# Patient Record
Sex: FEMALE | Race: WHITE | NOT HISPANIC OR LATINO | Employment: UNEMPLOYED | URBAN - METROPOLITAN AREA
[De-identification: names, ages, dates, MRNs, and addresses within clinical notes are randomized per-mention and may not be internally consistent; named-entity substitution may affect disease eponyms.]

---

## 2018-01-12 ENCOUNTER — OFFICE VISIT CONVERTED (OUTPATIENT)
Dept: INTERNAL MEDICINE | Facility: CLINIC | Age: 38
End: 2018-01-12
Attending: INTERNAL MEDICINE

## 2018-01-26 ENCOUNTER — OFFICE VISIT CONVERTED (OUTPATIENT)
Dept: INTERNAL MEDICINE | Facility: CLINIC | Age: 38
End: 2018-01-26
Attending: INTERNAL MEDICINE

## 2018-02-09 ENCOUNTER — CONVERSION ENCOUNTER (OUTPATIENT)
Dept: INTERNAL MEDICINE | Facility: CLINIC | Age: 38
End: 2018-02-09

## 2018-02-09 ENCOUNTER — OFFICE VISIT CONVERTED (OUTPATIENT)
Dept: INTERNAL MEDICINE | Facility: CLINIC | Age: 38
End: 2018-02-09
Attending: INTERNAL MEDICINE

## 2018-02-13 ENCOUNTER — OFFICE VISIT CONVERTED (OUTPATIENT)
Dept: FAMILY MEDICINE CLINIC | Facility: CLINIC | Age: 38
End: 2018-02-13
Attending: PHYSICIAN ASSISTANT

## 2018-03-06 ENCOUNTER — OFFICE VISIT CONVERTED (OUTPATIENT)
Dept: FAMILY MEDICINE CLINIC | Facility: CLINIC | Age: 38
End: 2018-03-06
Attending: PHYSICIAN ASSISTANT

## 2018-03-06 ENCOUNTER — CONVERSION ENCOUNTER (OUTPATIENT)
Dept: FAMILY MEDICINE CLINIC | Facility: CLINIC | Age: 38
End: 2018-03-06

## 2018-03-12 ENCOUNTER — OFFICE VISIT CONVERTED (OUTPATIENT)
Dept: SURGERY | Facility: CLINIC | Age: 38
End: 2018-03-12
Attending: UROLOGY

## 2018-03-15 ENCOUNTER — OFFICE VISIT CONVERTED (OUTPATIENT)
Dept: ORTHOPEDIC SURGERY | Facility: CLINIC | Age: 38
End: 2018-03-15
Attending: ORTHOPAEDIC SURGERY

## 2018-04-27 ENCOUNTER — PROCEDURE VISIT CONVERTED (OUTPATIENT)
Dept: SURGERY | Facility: CLINIC | Age: 38
End: 2018-04-27
Attending: UROLOGY

## 2018-04-30 ENCOUNTER — OFFICE VISIT CONVERTED (OUTPATIENT)
Dept: ORTHOPEDIC SURGERY | Facility: CLINIC | Age: 38
End: 2018-04-30
Attending: ORTHOPAEDIC SURGERY

## 2018-05-04 ENCOUNTER — OFFICE VISIT CONVERTED (OUTPATIENT)
Dept: DIABETES SERVICES | Facility: HOSPITAL | Age: 38
End: 2018-05-04
Attending: NURSE PRACTITIONER

## 2018-06-01 ENCOUNTER — OFFICE VISIT CONVERTED (OUTPATIENT)
Dept: DIABETES SERVICES | Facility: HOSPITAL | Age: 38
End: 2018-06-01
Attending: NURSE PRACTITIONER

## 2018-06-07 ENCOUNTER — OFFICE VISIT CONVERTED (OUTPATIENT)
Dept: FAMILY MEDICINE CLINIC | Facility: CLINIC | Age: 38
End: 2018-06-07
Attending: PHYSICIAN ASSISTANT

## 2018-09-10 ENCOUNTER — OFFICE VISIT CONVERTED (OUTPATIENT)
Dept: FAMILY MEDICINE CLINIC | Facility: CLINIC | Age: 38
End: 2018-09-10
Attending: PHYSICIAN ASSISTANT

## 2018-09-12 ENCOUNTER — OFFICE VISIT CONVERTED (OUTPATIENT)
Dept: DIABETES SERVICES | Facility: HOSPITAL | Age: 38
End: 2018-09-12
Attending: NURSE PRACTITIONER

## 2019-01-21 ENCOUNTER — OFFICE VISIT CONVERTED (OUTPATIENT)
Dept: FAMILY MEDICINE CLINIC | Facility: CLINIC | Age: 39
End: 2019-01-21
Attending: PHYSICIAN ASSISTANT

## 2019-03-09 ENCOUNTER — HOSPITAL ENCOUNTER (OUTPATIENT)
Dept: URGENT CARE | Facility: CLINIC | Age: 39
Discharge: HOME | End: 2019-03-09
Attending: FAMILY MEDICINE

## 2019-03-22 ENCOUNTER — HOSPITAL ENCOUNTER (OUTPATIENT)
Dept: OTHER | Facility: HOSPITAL | Age: 39
Discharge: HOME | End: 2019-03-22
Attending: PHYSICIAN ASSISTANT

## 2019-03-22 LAB
ALBUMIN SERPL-MCNC: 4.4 G/DL (ref 3.5–5)
ALBUMIN/GLOB SERPL: 1.5 {RATIO} (ref 1.4–2.6)
ALP SERPL-CCNC: 82 U/L (ref 42–98)
ALT SERPL-CCNC: 12 U/L (ref 10–40)
ANION GAP SERPL CALC-SCNC: 14 MMOL/L (ref 8–19)
APPEARANCE UR: CLEAR
AST SERPL-CCNC: 14 U/L (ref 15–50)
BASOPHILS # BLD AUTO: 0.08 10*3/UL (ref 0–0.2)
BASOPHILS NFR BLD AUTO: 0.7 % (ref 0–3)
BILIRUB SERPL-MCNC: 0.35 MG/DL (ref 0.2–1.3)
BILIRUB UR QL: NEGATIVE
BUN SERPL-MCNC: 11 MG/DL (ref 5–25)
BUN/CREAT SERPL: 14 {RATIO} (ref 6–20)
CALCIUM SERPL-MCNC: 9.5 MG/DL (ref 8.7–10.4)
CHLORIDE SERPL-SCNC: 102 MMOL/L (ref 99–111)
CHOLEST SERPL-MCNC: 159 MG/DL (ref 107–200)
CHOLEST/HDLC SERPL: 3.1 {RATIO} (ref 3–6)
COLOR UR: YELLOW
CONV ABS IMM GRAN: 0.04 10*3/UL (ref 0–0.2)
CONV BACTERIA: ABNORMAL
CONV CO2: 27 MMOL/L (ref 22–32)
CONV COLLECTION SOURCE (UA): ABNORMAL
CONV CREATININE URINE, RANDOM: 122.1 MG/DL (ref 10–300)
CONV HYALINE CASTS IN URINE MICRO: ABNORMAL /[LPF]
CONV IMMATURE GRAN: 0.3 % (ref 0–1.8)
CONV MICROALBUM.,U,RANDOM: 93.2 MG/L (ref 0–20)
CONV TOTAL PROTEIN: 7.3 G/DL (ref 6.3–8.2)
CONV UROBILINOGEN IN URINE BY AUTOMATED TEST STRIP: 0.2 {EHRLICHU}/DL (ref 0.1–1)
CREAT UR-MCNC: 0.76 MG/DL (ref 0.5–0.9)
DEPRECATED RDW RBC AUTO: 41.7 FL (ref 36.4–46.3)
EOSINOPHIL # BLD AUTO: 0.29 10*3/UL (ref 0–0.7)
EOSINOPHIL # BLD AUTO: 2.4 % (ref 0–7)
ERYTHROCYTE [DISTWIDTH] IN BLOOD BY AUTOMATED COUNT: 12 % (ref 11.7–14.4)
EST. AVERAGE GLUCOSE BLD GHB EST-MCNC: 111 MG/DL
GFR SERPLBLD BASED ON 1.73 SQ M-ARVRAT: >60 ML/MIN/{1.73_M2}
GLOBULIN UR ELPH-MCNC: 2.9 G/DL (ref 2–3.5)
GLUCOSE SERPL-MCNC: 91 MG/DL (ref 65–99)
GLUCOSE UR QL: >=1000 MG/DL
HBA1C MFR BLD: 13.9 G/DL (ref 12–16)
HBA1C MFR BLD: 5.5 % (ref 3.5–5.7)
HCT VFR BLD AUTO: 40.8 % (ref 37–47)
HDLC SERPL-MCNC: 52 MG/DL (ref 40–60)
HGB UR QL STRIP: ABNORMAL
KETONES UR QL STRIP: NEGATIVE MG/DL
LDLC SERPL CALC-MCNC: 83 MG/DL (ref 70–100)
LEUKOCYTE ESTERASE UR QL STRIP: NEGATIVE
LYMPHOCYTES # BLD AUTO: 4.04 10*3/UL (ref 1–5)
MCH RBC QN AUTO: 31.9 PG (ref 27–31)
MCHC RBC AUTO-ENTMCNC: 34.1 G/DL (ref 33–37)
MCV RBC AUTO: 93.6 FL (ref 81–99)
MICROALBUMIN/CREAT UR: 76.3 MG/G{CRE} (ref 0–35)
MONOCYTES # BLD AUTO: 0.82 10*3/UL (ref 0.2–1.2)
MONOCYTES NFR BLD AUTO: 6.7 % (ref 3–10)
NEUTROPHILS # BLD AUTO: 6.92 10*3/UL (ref 2–8)
NEUTROPHILS NFR BLD AUTO: 56.8 % (ref 30–85)
NITRITE UR QL STRIP: NEGATIVE
NRBC CBCN: 0 % (ref 0–0.7)
OSMOLALITY SERPL CALC.SUM OF ELEC: 287 MOSM/KG (ref 273–304)
PH UR STRIP.AUTO: 5.5 [PH] (ref 5–8)
PLATELET # BLD AUTO: 311 10*3/UL (ref 130–400)
PMV BLD AUTO: 9.5 FL (ref 9.4–12.3)
POTASSIUM SERPL-SCNC: 4.4 MMOL/L (ref 3.5–5.3)
PROT UR QL: ABNORMAL MG/DL
RBC # BLD AUTO: 4.36 10*6/UL (ref 4.2–5.4)
RBC #/AREA URNS HPF: ABNORMAL /[HPF]
SODIUM SERPL-SCNC: 139 MMOL/L (ref 135–147)
SP GR UR: 1.04 (ref 1–1.03)
SQUAMOUS SPT QL MICRO: ABNORMAL /[HPF]
T4 FREE SERPL-MCNC: 0.9 NG/DL (ref 0.9–1.8)
TRIGL SERPL-MCNC: 118 MG/DL (ref 40–150)
TSH SERPL-ACNC: 1.53 M[IU]/L (ref 0.27–4.2)
VARIANT LYMPHS NFR BLD MANUAL: 33.1 % (ref 20–45)
VLDLC SERPL-MCNC: 24 MG/DL (ref 5–37)
WBC # BLD AUTO: 12.19 10*3/UL (ref 4.8–10.8)
WBC #/AREA URNS HPF: ABNORMAL /[HPF]

## 2019-03-28 ENCOUNTER — HOSPITAL ENCOUNTER (OUTPATIENT)
Dept: OTHER | Facility: HOSPITAL | Age: 39
Discharge: HOME | End: 2019-03-28
Attending: PHYSICIAN ASSISTANT

## 2019-03-30 LAB — BACTERIA UR CULT: NORMAL

## 2019-04-03 ENCOUNTER — HOSPITAL ENCOUNTER (OUTPATIENT)
Dept: FAMILY MEDICINE CLINIC | Facility: CLINIC | Age: 39
Discharge: HOME | End: 2019-04-03
Attending: PHYSICIAN ASSISTANT

## 2019-04-03 LAB
PROT 24H UR-MRATE: 20.4 MG/DL (ref 0–12)
PROT 24H UR-MRATE: 377.4 MG/(24.H) (ref 42–225)
SPECIMEN VOL 24H UR: 1850 ML

## 2019-04-08 ENCOUNTER — HOSPITAL ENCOUNTER (OUTPATIENT)
Dept: OTHER | Facility: HOSPITAL | Age: 39
Setting detail: RECURRING SERIES
Discharge: HOME | End: 2019-06-06
Attending: ORTHOPAEDIC SURGERY

## 2019-04-30 ENCOUNTER — OFFICE VISIT CONVERTED (OUTPATIENT)
Dept: FAMILY MEDICINE CLINIC | Facility: CLINIC | Age: 39
End: 2019-04-30
Attending: PHYSICIAN ASSISTANT

## 2019-04-30 ENCOUNTER — HOSPITAL ENCOUNTER (OUTPATIENT)
Dept: GENERAL RADIOLOGY | Facility: HOSPITAL | Age: 39
Discharge: HOME | End: 2019-04-30
Attending: PHYSICIAN ASSISTANT

## 2019-06-12 ENCOUNTER — HOSPITAL ENCOUNTER (OUTPATIENT)
Dept: OTHER | Facility: HOSPITAL | Age: 39
Discharge: HOME | End: 2019-06-12
Attending: PHYSICIAN ASSISTANT

## 2019-06-12 LAB
ALBUMIN SERPL-MCNC: 4.2 G/DL (ref 3.5–5)
ALBUMIN/GLOB SERPL: 1.4 {RATIO} (ref 1.4–2.6)
ALP SERPL-CCNC: 82 U/L (ref 42–98)
ALT SERPL-CCNC: 18 U/L (ref 10–40)
ANION GAP SERPL CALC-SCNC: 21 MMOL/L (ref 8–19)
AST SERPL-CCNC: 21 U/L (ref 15–50)
BASOPHILS # BLD AUTO: 0.07 10*3/UL (ref 0–0.2)
BASOPHILS NFR BLD AUTO: 0.8 % (ref 0–3)
BILIRUB SERPL-MCNC: 0.33 MG/DL (ref 0.2–1.3)
BUN SERPL-MCNC: 13 MG/DL (ref 5–25)
BUN/CREAT SERPL: 16 {RATIO} (ref 6–20)
CALCIUM SERPL-MCNC: 9.2 MG/DL (ref 8.7–10.4)
CHLORIDE SERPL-SCNC: 104 MMOL/L (ref 99–111)
CONV ABS IMM GRAN: 0.02 10*3/UL (ref 0–0.2)
CONV CO2: 21 MMOL/L (ref 22–32)
CONV IMMATURE GRAN: 0.2 % (ref 0–1.8)
CONV TOTAL PROTEIN: 7.2 G/DL (ref 6.3–8.2)
CREAT UR-MCNC: 0.81 MG/DL (ref 0.5–0.9)
DEPRECATED RDW RBC AUTO: 41 FL (ref 36.4–46.3)
EOSINOPHIL # BLD AUTO: 0.46 10*3/UL (ref 0–0.7)
EOSINOPHIL # BLD AUTO: 5.4 % (ref 0–7)
ERYTHROCYTE [DISTWIDTH] IN BLOOD BY AUTOMATED COUNT: 12.3 % (ref 11.7–14.4)
GFR SERPLBLD BASED ON 1.73 SQ M-ARVRAT: >60 ML/MIN/{1.73_M2}
GLOBULIN UR ELPH-MCNC: 3 G/DL (ref 2–3.5)
GLUCOSE SERPL-MCNC: 106 MG/DL (ref 65–99)
HBA1C MFR BLD: 14.5 G/DL (ref 12–16)
HCT VFR BLD AUTO: 41.8 % (ref 37–47)
LYMPHOCYTES # BLD AUTO: 2.9 10*3/UL (ref 1–5)
MCH RBC QN AUTO: 32 PG (ref 27–31)
MCHC RBC AUTO-ENTMCNC: 34.7 G/DL (ref 33–37)
MCV RBC AUTO: 92.3 FL (ref 81–99)
MONOCYTES # BLD AUTO: 0.52 10*3/UL (ref 0.2–1.2)
MONOCYTES NFR BLD AUTO: 6.1 % (ref 3–10)
NEUTROPHILS # BLD AUTO: 4.57 10*3/UL (ref 2–8)
NEUTROPHILS NFR BLD AUTO: 53.5 % (ref 30–85)
NRBC CBCN: 0 % (ref 0–0.7)
OSMOLALITY SERPL CALC.SUM OF ELEC: 295 MOSM/KG (ref 273–304)
PLATELET # BLD AUTO: 337 10*3/UL (ref 130–400)
PMV BLD AUTO: 9.2 FL (ref 9.4–12.3)
POTASSIUM SERPL-SCNC: 4.2 MMOL/L (ref 3.5–5.3)
RBC # BLD AUTO: 4.53 10*6/UL (ref 4.2–5.4)
SODIUM SERPL-SCNC: 142 MMOL/L (ref 135–147)
VARIANT LYMPHS NFR BLD MANUAL: 34 % (ref 20–45)
WBC # BLD AUTO: 8.54 10*3/UL (ref 4.8–10.8)

## 2019-07-15 ENCOUNTER — HOSPITAL ENCOUNTER (OUTPATIENT)
Dept: OTHER | Facility: HOSPITAL | Age: 39
Discharge: HOME | End: 2019-07-15
Attending: NURSE PRACTITIONER

## 2019-07-15 LAB
ALBUMIN SERPL-MCNC: 4.1 G/DL (ref 3.5–5)
ALBUMIN/GLOB SERPL: 1.4 {RATIO} (ref 1.4–2.6)
ALP SERPL-CCNC: 79 U/L (ref 42–98)
ALT SERPL-CCNC: 16 U/L (ref 10–40)
ANION GAP SERPL CALC-SCNC: 15 MMOL/L (ref 8–19)
APPEARANCE UR: CLEAR
AST SERPL-CCNC: 20 U/L (ref 15–50)
BILIRUB SERPL-MCNC: 0.51 MG/DL (ref 0.2–1.3)
BILIRUB UR QL: NEGATIVE
BUN SERPL-MCNC: 8 MG/DL (ref 5–25)
BUN/CREAT SERPL: 10 {RATIO} (ref 6–20)
CALCIUM SERPL-MCNC: 9.2 MG/DL (ref 8.7–10.4)
CHLORIDE SERPL-SCNC: 102 MMOL/L (ref 99–111)
COLOR UR: YELLOW
CONV BACTERIA: NEGATIVE
CONV CO2: 26 MMOL/L (ref 22–32)
CONV COLLECTION SOURCE (UA): ABNORMAL
CONV CREATININE URINE, RANDOM: 94.1 MG/DL (ref 10–300)
CONV MICROALBUM.,U,RANDOM: 266.8 MG/L (ref 0–20)
CONV PROTEIN TO CREATININE RATIO (RANDOM URINE): 0.6 {RATIO} (ref 0–0.1)
CONV TOTAL PROTEIN: 7 G/DL (ref 6.3–8.2)
CONV UROBILINOGEN IN URINE BY AUTOMATED TEST STRIP: 0.2 {EHRLICHU}/DL (ref 0.1–1)
CREAT UR-MCNC: 0.77 MG/DL (ref 0.5–0.9)
GFR SERPLBLD BASED ON 1.73 SQ M-ARVRAT: >60 ML/MIN/{1.73_M2}
GLOBULIN UR ELPH-MCNC: 2.9 G/DL (ref 2–3.5)
GLUCOSE SERPL-MCNC: 96 MG/DL (ref 65–99)
GLUCOSE UR QL: >=1000 MG/DL
HGB UR QL STRIP: ABNORMAL
KETONES UR QL STRIP: NEGATIVE MG/DL
LEUKOCYTE ESTERASE UR QL STRIP: NEGATIVE
MICROALBUMIN/CREAT UR: 283.5 MG/G{CRE} (ref 0–35)
NITRITE UR QL STRIP: NEGATIVE
OSMOLALITY SERPL CALC.SUM OF ELEC: 286 MOSM/KG (ref 273–304)
PH UR STRIP.AUTO: 6.5 [PH] (ref 5–8)
POTASSIUM SERPL-SCNC: 4 MMOL/L (ref 3.5–5.3)
PROT UR QL: 30 MG/DL
PROT UR-MCNC: 56.5 MG/DL
RBC #/AREA URNS HPF: ABNORMAL /[HPF]
SODIUM SERPL-SCNC: 139 MMOL/L (ref 135–147)
SP GR UR: 1.03 (ref 1–1.03)
WBC #/AREA URNS HPF: ABNORMAL /[HPF]

## 2019-07-30 ENCOUNTER — OFFICE VISIT CONVERTED (OUTPATIENT)
Dept: FAMILY MEDICINE CLINIC | Facility: CLINIC | Age: 39
End: 2019-07-30
Attending: PHYSICIAN ASSISTANT

## 2019-08-20 ENCOUNTER — HOSPITAL ENCOUNTER (OUTPATIENT)
Dept: URGENT CARE | Facility: CLINIC | Age: 39
Discharge: HOME | End: 2019-08-20
Attending: FAMILY MEDICINE

## 2019-08-22 ENCOUNTER — HOSPITAL ENCOUNTER (OUTPATIENT)
Dept: OTHER | Facility: HOSPITAL | Age: 39
Discharge: HOME | End: 2019-08-22
Attending: PHYSICIAN ASSISTANT

## 2019-08-22 LAB
ALBUMIN SERPL-MCNC: 4.3 G/DL (ref 3.5–5)
ALBUMIN/GLOB SERPL: 1.4 {RATIO} (ref 1.4–2.6)
ALP SERPL-CCNC: 83 U/L (ref 42–98)
ALT SERPL-CCNC: 13 U/L (ref 10–40)
ANION GAP SERPL CALC-SCNC: 22 MMOL/L (ref 8–19)
AST SERPL-CCNC: 14 U/L (ref 15–50)
BACTERIA SPEC AEROBE CULT: NORMAL
BASOPHILS # BLD AUTO: 0.08 10*3/UL (ref 0–0.2)
BASOPHILS NFR BLD AUTO: 0.8 % (ref 0–3)
BILIRUB SERPL-MCNC: 0.27 MG/DL (ref 0.2–1.3)
BUN SERPL-MCNC: 8 MG/DL (ref 5–25)
BUN/CREAT SERPL: 10 {RATIO} (ref 6–20)
CALCIUM SERPL-MCNC: 9.2 MG/DL (ref 8.7–10.4)
CHLORIDE SERPL-SCNC: 102 MMOL/L (ref 99–111)
CONV ABS IMM GRAN: 0.03 10*3/UL (ref 0–0.2)
CONV CO2: 20 MMOL/L (ref 22–32)
CONV IMMATURE GRAN: 0.3 % (ref 0–1.8)
CONV TOTAL PROTEIN: 7.3 G/DL (ref 6.3–8.2)
CREAT UR-MCNC: 0.84 MG/DL (ref 0.5–0.9)
DEPRECATED RDW RBC AUTO: 42.6 FL (ref 36.4–46.3)
EOSINOPHIL # BLD AUTO: 0.6 10*3/UL (ref 0–0.7)
EOSINOPHIL # BLD AUTO: 5.7 % (ref 0–7)
ERYTHROCYTE [DISTWIDTH] IN BLOOD BY AUTOMATED COUNT: 12.6 % (ref 11.7–14.4)
GFR SERPLBLD BASED ON 1.73 SQ M-ARVRAT: >60 ML/MIN/{1.73_M2}
GLOBULIN UR ELPH-MCNC: 3 G/DL (ref 2–3.5)
GLUCOSE SERPL-MCNC: 147 MG/DL (ref 65–99)
HCT VFR BLD AUTO: 39.2 % (ref 37–47)
HGB BLD-MCNC: 13.7 G/DL (ref 12–16)
LYMPHOCYTES # BLD AUTO: 3.55 10*3/UL (ref 1–5)
LYMPHOCYTES NFR BLD AUTO: 34 % (ref 20–45)
MCH RBC QN AUTO: 32.3 PG (ref 27–31)
MCHC RBC AUTO-ENTMCNC: 34.9 G/DL (ref 33–37)
MCV RBC AUTO: 92.5 FL (ref 81–99)
MONOCYTES # BLD AUTO: 0.59 10*3/UL (ref 0.2–1.2)
MONOCYTES NFR BLD AUTO: 5.7 % (ref 3–10)
NEUTROPHILS # BLD AUTO: 5.59 10*3/UL (ref 2–8)
NEUTROPHILS NFR BLD AUTO: 53.5 % (ref 30–85)
NRBC CBCN: 0 % (ref 0–0.7)
OSMOLALITY SERPL CALC.SUM OF ELEC: 291 MOSM/KG (ref 273–304)
PLATELET # BLD AUTO: 335 10*3/UL (ref 130–400)
PMV BLD AUTO: 9 FL (ref 9.4–12.3)
POTASSIUM SERPL-SCNC: 3.7 MMOL/L (ref 3.5–5.3)
RBC # BLD AUTO: 4.24 10*6/UL (ref 4.2–5.4)
SODIUM SERPL-SCNC: 140 MMOL/L (ref 135–147)
WBC # BLD AUTO: 10.44 10*3/UL (ref 4.8–10.8)

## 2019-08-26 ENCOUNTER — HOSPITAL ENCOUNTER (OUTPATIENT)
Dept: OTHER | Facility: HOSPITAL | Age: 39
Discharge: HOME | End: 2019-08-26
Attending: PHYSICIAN ASSISTANT

## 2019-08-26 LAB
ALBUMIN SERPL-MCNC: 4.3 G/DL (ref 3.5–5)
ALBUMIN/GLOB SERPL: 1.4 {RATIO} (ref 1.4–2.6)
ALP SERPL-CCNC: 81 U/L (ref 42–98)
ALT SERPL-CCNC: 14 U/L (ref 10–40)
ANION GAP SERPL CALC-SCNC: 13 MMOL/L (ref 8–19)
AST SERPL-CCNC: 17 U/L (ref 15–50)
BILIRUB SERPL-MCNC: 0.49 MG/DL (ref 0.2–1.3)
BUN SERPL-MCNC: 9 MG/DL (ref 5–25)
BUN/CREAT SERPL: 13 {RATIO} (ref 6–20)
CALCIUM SERPL-MCNC: 9.2 MG/DL (ref 8.7–10.4)
CHLORIDE SERPL-SCNC: 104 MMOL/L (ref 99–111)
CHOLEST SERPL-MCNC: 156 MG/DL (ref 107–200)
CHOLEST/HDLC SERPL: 3.3 {RATIO} (ref 3–6)
CONV CO2: 26 MMOL/L (ref 22–32)
CONV QUANTIFERON TB GOLD: NEGATIVE
CONV TOTAL PROTEIN: 7.3 G/DL (ref 6.3–8.2)
CREAT UR-MCNC: 0.7 MG/DL (ref 0.5–0.9)
EST. AVERAGE GLUCOSE BLD GHB EST-MCNC: 117 MG/DL
GFR SERPLBLD BASED ON 1.73 SQ M-ARVRAT: >60 ML/MIN/{1.73_M2}
GLOBULIN UR ELPH-MCNC: 3 G/DL (ref 2–3.5)
GLUCOSE SERPL-MCNC: 93 MG/DL (ref 65–99)
HBA1C MFR BLD: 5.7 % (ref 3.5–5.7)
HDLC SERPL-MCNC: 48 MG/DL (ref 40–60)
LDLC SERPL CALC-MCNC: 88 MG/DL (ref 70–100)
OSMOLALITY SERPL CALC.SUM OF ELEC: 286 MOSM/KG (ref 273–304)
POTASSIUM SERPL-SCNC: 4.3 MMOL/L (ref 3.5–5.3)
QUANTIFERON CRITERIA: NORMAL
QUANTIFERON MITOGEN VALUE: >10 IU/ML
QUANTIFERON NIL VALUE: 0.03 IU/ML
QUANTIFERON TB1 AG VALUE: 0.06 IU/ML
QUANTIFERON TB2 AG VALUE: 0.04 IU/ML
SODIUM SERPL-SCNC: 139 MMOL/L (ref 135–147)
TRIGL SERPL-MCNC: 101 MG/DL (ref 40–150)
VLDLC SERPL-MCNC: 20 MG/DL (ref 5–37)

## 2020-02-17 ENCOUNTER — OFFICE VISIT CONVERTED (OUTPATIENT)
Dept: FAMILY MEDICINE CLINIC | Facility: CLINIC | Age: 40
End: 2020-02-17
Attending: PHYSICIAN ASSISTANT

## 2020-02-17 ENCOUNTER — HOSPITAL ENCOUNTER (OUTPATIENT)
Dept: OTHER | Facility: HOSPITAL | Age: 40
Discharge: HOME | End: 2020-02-17
Attending: PHYSICIAN ASSISTANT

## 2020-02-17 LAB
ALBUMIN SERPL-MCNC: 4.4 G/DL (ref 3.5–5)
ALBUMIN/GLOB SERPL: 1.5 {RATIO} (ref 1.4–2.6)
ALP SERPL-CCNC: 71 U/L (ref 42–98)
ALT SERPL-CCNC: 17 U/L (ref 10–40)
ANION GAP SERPL CALC-SCNC: 20 MMOL/L (ref 8–19)
APPEARANCE UR: CLEAR
AST SERPL-CCNC: 18 U/L (ref 15–50)
BASOPHILS # BLD AUTO: 0.1 10*3/UL (ref 0–0.2)
BASOPHILS NFR BLD AUTO: 1.2 % (ref 0–3)
BILIRUB SERPL-MCNC: 0.48 MG/DL (ref 0.2–1.3)
BILIRUB UR QL: NEGATIVE
BUN SERPL-MCNC: 7 MG/DL (ref 5–25)
BUN/CREAT SERPL: 10 {RATIO} (ref 6–20)
CALCIUM SERPL-MCNC: 9 MG/DL (ref 8.7–10.4)
CHLORIDE SERPL-SCNC: 105 MMOL/L (ref 99–111)
CHOLEST SERPL-MCNC: 156 MG/DL (ref 107–200)
CHOLEST/HDLC SERPL: 3.6 {RATIO} (ref 3–6)
COLOR UR: YELLOW
CONV ABS IMM GRAN: 0.02 10*3/UL (ref 0–0.2)
CONV BACTERIA: NEGATIVE
CONV CO2: 19 MMOL/L (ref 22–32)
CONV COLLECTION SOURCE (UA): ABNORMAL
CONV CREATININE URINE, RANDOM: 192.8 MG/DL (ref 10–300)
CONV HYALINE CASTS IN URINE MICRO: ABNORMAL /[LPF]
CONV IMMATURE GRAN: 0.2 % (ref 0–1.8)
CONV MICROALBUM.,U,RANDOM: 193.5 MG/L (ref 0–20)
CONV TOTAL PROTEIN: 7.3 G/DL (ref 6.3–8.2)
CONV UROBILINOGEN IN URINE BY AUTOMATED TEST STRIP: 0.2 {EHRLICHU}/DL (ref 0.1–1)
CREAT UR-MCNC: 0.72 MG/DL (ref 0.5–0.9)
DEPRECATED RDW RBC AUTO: 40 FL (ref 36.4–46.3)
EOSINOPHIL # BLD AUTO: 0.44 10*3/UL (ref 0–0.7)
EOSINOPHIL # BLD AUTO: 5.4 % (ref 0–7)
ERYTHROCYTE [DISTWIDTH] IN BLOOD BY AUTOMATED COUNT: 11.9 % (ref 11.7–14.4)
EST. AVERAGE GLUCOSE BLD GHB EST-MCNC: 154 MG/DL
GFR SERPLBLD BASED ON 1.73 SQ M-ARVRAT: >60 ML/MIN/{1.73_M2}
GLOBULIN UR ELPH-MCNC: 2.9 G/DL (ref 2–3.5)
GLUCOSE SERPL-MCNC: 121 MG/DL (ref 65–99)
GLUCOSE UR QL: NEGATIVE MG/DL
HBA1C MFR BLD: 7 % (ref 3.5–5.7)
HCT VFR BLD AUTO: 40.6 % (ref 37–47)
HDLC SERPL-MCNC: 43 MG/DL (ref 40–60)
HGB BLD-MCNC: 14.1 G/DL (ref 12–16)
HGB UR QL STRIP: ABNORMAL
KETONES UR QL STRIP: NEGATIVE MG/DL
LDLC SERPL CALC-MCNC: 99 MG/DL (ref 70–100)
LEUKOCYTE ESTERASE UR QL STRIP: ABNORMAL
LYMPHOCYTES # BLD AUTO: 2.85 10*3/UL (ref 1–5)
LYMPHOCYTES NFR BLD AUTO: 34.8 % (ref 20–45)
MCH RBC QN AUTO: 31.8 PG (ref 27–31)
MCHC RBC AUTO-ENTMCNC: 34.7 G/DL (ref 33–37)
MCV RBC AUTO: 91.4 FL (ref 81–99)
MICROALBUMIN/CREAT UR: 100.4 MG/G{CRE} (ref 0–35)
MONOCYTES # BLD AUTO: 0.57 10*3/UL (ref 0.2–1.2)
MONOCYTES NFR BLD AUTO: 7 % (ref 3–10)
NEUTROPHILS # BLD AUTO: 4.21 10*3/UL (ref 2–8)
NEUTROPHILS NFR BLD AUTO: 51.4 % (ref 30–85)
NITRITE UR QL STRIP: NEGATIVE
NRBC CBCN: 0 % (ref 0–0.7)
OSMOLALITY SERPL CALC.SUM OF ELEC: 289 MOSM/KG (ref 273–304)
PH UR STRIP.AUTO: 5 [PH] (ref 5–8)
PLATELET # BLD AUTO: 324 10*3/UL (ref 130–400)
PMV BLD AUTO: 9.5 FL (ref 9.4–12.3)
POTASSIUM SERPL-SCNC: 4 MMOL/L (ref 3.5–5.3)
PROT UR QL: 30 MG/DL
RBC # BLD AUTO: 4.44 10*6/UL (ref 4.2–5.4)
RBC #/AREA URNS HPF: ABNORMAL /[HPF]
SODIUM SERPL-SCNC: 140 MMOL/L (ref 135–147)
SP GR UR: 1.02 (ref 1–1.03)
T4 FREE SERPL-MCNC: 1 NG/DL (ref 0.9–1.8)
TRIGL SERPL-MCNC: 72 MG/DL (ref 40–150)
TSH SERPL-ACNC: 1.27 M[IU]/L (ref 0.27–4.2)
VLDLC SERPL-MCNC: 14 MG/DL (ref 5–37)
WBC # BLD AUTO: 8.19 10*3/UL (ref 4.8–10.8)
WBC #/AREA URNS HPF: ABNORMAL /[HPF]

## 2020-02-19 LAB — BACTERIA UR CULT: NORMAL

## 2020-03-09 ENCOUNTER — HOSPITAL ENCOUNTER (OUTPATIENT)
Dept: GENERAL RADIOLOGY | Facility: HOSPITAL | Age: 40
Discharge: HOME | End: 2020-03-09
Attending: PHYSICIAN ASSISTANT

## 2020-03-14 ENCOUNTER — HOSPITAL ENCOUNTER (OUTPATIENT)
Dept: URGENT CARE | Facility: CLINIC | Age: 40
Discharge: HOME | End: 2020-03-14
Attending: NURSE PRACTITIONER

## 2020-03-16 ENCOUNTER — CONVERSION ENCOUNTER (OUTPATIENT)
Dept: FAMILY MEDICINE CLINIC | Facility: CLINIC | Age: 40
End: 2020-03-16

## 2020-03-16 ENCOUNTER — OFFICE VISIT CONVERTED (OUTPATIENT)
Dept: PODIATRY | Facility: CLINIC | Age: 40
End: 2020-03-16
Attending: PODIATRIST

## 2020-03-16 ENCOUNTER — HOSPITAL ENCOUNTER (OUTPATIENT)
Dept: OTHER | Facility: HOSPITAL | Age: 40
Discharge: HOME | End: 2020-03-16
Attending: NURSE PRACTITIONER

## 2020-03-16 ENCOUNTER — OFFICE VISIT CONVERTED (OUTPATIENT)
Dept: FAMILY MEDICINE CLINIC | Facility: CLINIC | Age: 40
End: 2020-03-16
Attending: NURSE PRACTITIONER

## 2020-03-16 ENCOUNTER — HOSPITAL ENCOUNTER (OUTPATIENT)
Dept: FAMILY MEDICINE CLINIC | Facility: CLINIC | Age: 40
Discharge: HOME | End: 2020-03-16
Attending: NURSE PRACTITIONER

## 2020-03-16 ENCOUNTER — CONVERSION ENCOUNTER (OUTPATIENT)
Dept: PODIATRY | Facility: CLINIC | Age: 40
End: 2020-03-16

## 2020-03-16 LAB
CONV HIV-1/ HIV-2: NEGATIVE
CONV URINE SCREEN FOR TRICHOMONAS: PRESENT

## 2020-03-17 LAB
CONV HEPATITIS B SURFACE AG W CONFIRMATION RE: NEGATIVE
CONV HEPATITIS COMMENT: NORMAL
HAV AB SER QL IA: POSITIVE
HAV IGM SERPL QL IA: NEGATIVE
HBV CORE AB SER DONR QL IA: NEGATIVE
HBV CORE IGM SERPL QL IA: NEGATIVE
HBV E AB SERPL QL IA: NEGATIVE
HBV E AG SERPL QL IA: NEGATIVE
HBV SURFACE AB SER QL: REACTIVE
HCV AB S/CO SERPL IA: <0.1 S/CO RATIO (ref 0–0.9)

## 2020-03-18 LAB — BACTERIA SPEC AEROBE CULT: NORMAL

## 2020-03-19 LAB
C TRACH RRNA CVX QL NAA+PROBE: NOT DETECTED
CONV LAST MENSTURAL PERIOD: NORMAL
HSV I/II IGM: 1.07 RATIO (ref 0–0.9)
HSV1 IGG SER IA-ACNC: 25.4 INDEX (ref 0–0.9)
HSV2 IGG SER IA-ACNC: <0.91 INDEX (ref 0–0.9)
N GONORRHOEA DNA SPEC QL NAA+PROBE: NOT DETECTED
RPR SER QL: ABNORMAL
SPECIMEN SOURCE: NORMAL
SPECIMEN SOURCE: NORMAL
THIN PREP CVX: NORMAL

## 2020-04-20 ENCOUNTER — CONVERSION ENCOUNTER (OUTPATIENT)
Dept: FAMILY MEDICINE CLINIC | Facility: CLINIC | Age: 40
End: 2020-04-20

## 2020-04-20 ENCOUNTER — OFFICE VISIT CONVERTED (OUTPATIENT)
Dept: FAMILY MEDICINE CLINIC | Facility: CLINIC | Age: 40
End: 2020-04-20
Attending: NURSE PRACTITIONER

## 2020-04-20 ENCOUNTER — HOSPITAL ENCOUNTER (OUTPATIENT)
Dept: FAMILY MEDICINE CLINIC | Facility: CLINIC | Age: 40
Discharge: HOME | End: 2020-04-20
Attending: NURSE PRACTITIONER

## 2020-04-22 LAB — BACTERIA SPEC AEROBE CULT: NORMAL

## 2020-05-18 ENCOUNTER — OFFICE VISIT CONVERTED (OUTPATIENT)
Dept: FAMILY MEDICINE CLINIC | Facility: CLINIC | Age: 40
End: 2020-05-18
Attending: PHYSICIAN ASSISTANT

## 2020-05-19 ENCOUNTER — HOSPITAL ENCOUNTER (OUTPATIENT)
Dept: GENERAL RADIOLOGY | Facility: HOSPITAL | Age: 40
Discharge: HOME | End: 2020-05-19
Attending: PHYSICIAN ASSISTANT

## 2020-05-22 ENCOUNTER — HOSPITAL ENCOUNTER (OUTPATIENT)
Dept: LAB | Facility: HOSPITAL | Age: 40
Discharge: HOME | End: 2020-05-22
Attending: PHYSICIAN ASSISTANT

## 2020-05-22 LAB
ALBUMIN SERPL-MCNC: 4.3 G/DL (ref 3.5–5)
ALBUMIN/GLOB SERPL: 1.5 {RATIO} (ref 1.4–2.6)
ALP SERPL-CCNC: 80 U/L (ref 42–98)
ALT SERPL-CCNC: 30 U/L (ref 10–40)
ANION GAP SERPL CALC-SCNC: 16 MMOL/L (ref 8–19)
APPEARANCE UR: CLEAR
AST SERPL-CCNC: 28 U/L (ref 15–50)
BILIRUB SERPL-MCNC: 0.3 MG/DL (ref 0.2–1.3)
BILIRUB UR QL: NEGATIVE
BUN SERPL-MCNC: 11 MG/DL (ref 5–25)
BUN/CREAT SERPL: 13 {RATIO} (ref 6–20)
CALCIUM SERPL-MCNC: 9.4 MG/DL (ref 8.7–10.4)
CHLORIDE SERPL-SCNC: 100 MMOL/L (ref 99–111)
COLOR UR: YELLOW
CONV BACTERIA: NEGATIVE
CONV CO2: 25 MMOL/L (ref 22–32)
CONV COLLECTION SOURCE (UA): ABNORMAL
CONV TOTAL PROTEIN: 7.2 G/DL (ref 6.3–8.2)
CONV UROBILINOGEN IN URINE BY AUTOMATED TEST STRIP: 0.2 {EHRLICHU}/DL (ref 0.1–1)
CREAT UR-MCNC: 0.87 MG/DL (ref 0.5–0.9)
EST. AVERAGE GLUCOSE BLD GHB EST-MCNC: 131 MG/DL
GFR SERPLBLD BASED ON 1.73 SQ M-ARVRAT: >60 ML/MIN/{1.73_M2}
GLOBULIN UR ELPH-MCNC: 2.9 G/DL (ref 2–3.5)
GLUCOSE SERPL-MCNC: 191 MG/DL (ref 65–99)
GLUCOSE UR QL: >=1000 MG/DL
HBA1C MFR BLD: 6.2 % (ref 3.5–5.7)
HGB UR QL STRIP: ABNORMAL
KETONES UR QL STRIP: ABNORMAL MG/DL
LEUKOCYTE ESTERASE UR QL STRIP: NEGATIVE
NITRITE UR QL STRIP: NEGATIVE
OSMOLALITY SERPL CALC.SUM OF ELEC: 289 MOSM/KG (ref 273–304)
PH UR STRIP.AUTO: 5.5 [PH] (ref 5–8)
POTASSIUM SERPL-SCNC: 4.2 MMOL/L (ref 3.5–5.3)
PROT UR QL: ABNORMAL MG/DL
RBC #/AREA URNS HPF: ABNORMAL /[HPF]
SODIUM SERPL-SCNC: 137 MMOL/L (ref 135–147)
SP GR UR: 1.03 (ref 1–1.03)
SQUAMOUS SPT QL MICRO: ABNORMAL /[HPF]
T4 FREE SERPL-MCNC: 1 NG/DL (ref 0.9–1.8)
TSH SERPL-ACNC: 1.6 M[IU]/L (ref 0.27–4.2)
WBC #/AREA URNS HPF: ABNORMAL /[HPF]

## 2020-06-16 ENCOUNTER — HOSPITAL ENCOUNTER (OUTPATIENT)
Dept: LAB | Facility: HOSPITAL | Age: 40
Discharge: HOME | End: 2020-06-16
Attending: PHYSICIAN ASSISTANT

## 2020-06-16 ENCOUNTER — HOSPITAL ENCOUNTER (OUTPATIENT)
Dept: GENERAL RADIOLOGY | Facility: HOSPITAL | Age: 40
Discharge: HOME | End: 2020-06-16
Attending: PHYSICIAN ASSISTANT

## 2020-06-16 LAB
APPEARANCE UR: ABNORMAL
BILIRUB UR QL: NEGATIVE
COLOR UR: YELLOW
CONV BACTERIA: ABNORMAL
CONV COLLECTION SOURCE (UA): ABNORMAL
CONV CRYSTALS: ABNORMAL /[HPF]
CONV UROBILINOGEN IN URINE BY AUTOMATED TEST STRIP: 0.2 {EHRLICHU}/DL (ref 0.1–1)
GLUCOSE UR QL: >=1000 MG/DL
HGB UR QL STRIP: ABNORMAL
KETONES UR QL STRIP: NEGATIVE MG/DL
LEUKOCYTE ESTERASE UR QL STRIP: NEGATIVE
NITRITE UR QL STRIP: NEGATIVE
PH UR STRIP.AUTO: 5 [PH] (ref 5–8)
PROT UR QL: 30 MG/DL
RBC #/AREA URNS HPF: ABNORMAL /[HPF]
SP GR UR: 1.04 (ref 1–1.03)
SQUAMOUS SPT QL MICRO: ABNORMAL /[HPF]
WBC #/AREA URNS HPF: ABNORMAL /[HPF]

## 2020-06-23 ENCOUNTER — HOSPITAL ENCOUNTER (OUTPATIENT)
Dept: PHYSICAL THERAPY | Facility: CLINIC | Age: 40
Setting detail: RECURRING SERIES
Discharge: STILL ADMITTED OR ADMINISTRATIVE | End: 2020-12-04
Attending: FAMILY MEDICINE

## 2020-08-20 ENCOUNTER — OFFICE VISIT CONVERTED (OUTPATIENT)
Dept: FAMILY MEDICINE CLINIC | Facility: CLINIC | Age: 40
End: 2020-08-20
Attending: PHYSICIAN ASSISTANT

## 2020-11-23 ENCOUNTER — TELEMEDICINE CONVERTED (OUTPATIENT)
Dept: FAMILY MEDICINE CLINIC | Facility: CLINIC | Age: 40
End: 2020-11-23
Attending: PHYSICIAN ASSISTANT

## 2020-12-08 ENCOUNTER — HOSPITAL ENCOUNTER (OUTPATIENT)
Dept: PHYSICAL THERAPY | Facility: CLINIC | Age: 40
Setting detail: RECURRING SERIES
Discharge: HOME | End: 2021-01-13
Attending: INTERNAL MEDICINE

## 2020-12-18 ENCOUNTER — HOSPITAL ENCOUNTER (OUTPATIENT)
Dept: LAB | Facility: HOSPITAL | Age: 40
Discharge: HOME | End: 2020-12-18
Attending: PHYSICIAN ASSISTANT

## 2020-12-18 LAB
ALBUMIN SERPL-MCNC: 4.1 G/DL (ref 3.5–5)
ALBUMIN/GLOB SERPL: 1.3 {RATIO} (ref 1.4–2.6)
ALP SERPL-CCNC: 72 U/L (ref 42–98)
ALT SERPL-CCNC: 33 U/L (ref 10–40)
ANION GAP SERPL CALC-SCNC: 15 MMOL/L (ref 8–19)
AST SERPL-CCNC: 37 U/L (ref 15–50)
BILIRUB SERPL-MCNC: 0.32 MG/DL (ref 0.2–1.3)
BUN SERPL-MCNC: 10 MG/DL (ref 5–25)
BUN/CREAT SERPL: 11 {RATIO} (ref 6–20)
CALCIUM SERPL-MCNC: 9 MG/DL (ref 8.7–10.4)
CHLORIDE SERPL-SCNC: 106 MMOL/L (ref 99–111)
CHOLEST SERPL-MCNC: 175 MG/DL (ref 107–200)
CHOLEST/HDLC SERPL: 3.5 {RATIO} (ref 3–6)
CONV CO2: 20 MMOL/L (ref 22–32)
CONV TOTAL PROTEIN: 7.2 G/DL (ref 6.3–8.2)
CREAT UR-MCNC: 0.9 MG/DL (ref 0.5–0.9)
EST. AVERAGE GLUCOSE BLD GHB EST-MCNC: 143 MG/DL
GFR SERPLBLD BASED ON 1.73 SQ M-ARVRAT: >60 ML/MIN/{1.73_M2}
GLOBULIN UR ELPH-MCNC: 3.1 G/DL (ref 2–3.5)
GLUCOSE SERPL-MCNC: 124 MG/DL (ref 65–99)
HBA1C MFR BLD: 6.6 % (ref 3.5–5.7)
HDLC SERPL-MCNC: 50 MG/DL (ref 40–60)
LDLC SERPL CALC-MCNC: 102 MG/DL (ref 70–100)
OSMOLALITY SERPL CALC.SUM OF ELEC: 284 MOSM/KG (ref 273–304)
POTASSIUM SERPL-SCNC: 4.3 MMOL/L (ref 3.5–5.3)
SODIUM SERPL-SCNC: 137 MMOL/L (ref 135–147)
TRIGL SERPL-MCNC: 116 MG/DL (ref 40–150)
VLDLC SERPL-MCNC: 23 MG/DL (ref 5–37)

## 2021-01-06 ENCOUNTER — OFFICE VISIT CONVERTED (OUTPATIENT)
Dept: NEUROSURGERY | Facility: CLINIC | Age: 41
End: 2021-01-06
Attending: PHYSICIAN ASSISTANT

## 2021-02-09 ENCOUNTER — HOSPITAL ENCOUNTER (OUTPATIENT)
Dept: URGENT CARE | Facility: CLINIC | Age: 41
Discharge: HOME | End: 2021-02-09
Attending: NURSE PRACTITIONER

## 2021-02-23 ENCOUNTER — TELEMEDICINE CONVERTED (OUTPATIENT)
Dept: FAMILY MEDICINE CLINIC | Facility: CLINIC | Age: 41
End: 2021-02-23
Attending: PHYSICIAN ASSISTANT

## 2021-03-02 ENCOUNTER — TELEMEDICINE CONVERTED (OUTPATIENT)
Dept: FAMILY MEDICINE CLINIC | Facility: CLINIC | Age: 41
End: 2021-03-02
Attending: PHYSICIAN ASSISTANT

## 2021-03-11 ENCOUNTER — HOSPITAL ENCOUNTER (OUTPATIENT)
Dept: LAB | Facility: HOSPITAL | Age: 41
Discharge: HOME | End: 2021-03-11
Attending: PHYSICIAN ASSISTANT

## 2021-03-11 LAB
25(OH)D3 SERPL-MCNC: 12.8 NG/ML (ref 30–100)
ALBUMIN SERPL-MCNC: 4.4 G/DL (ref 3.5–5)
ALBUMIN/GLOB SERPL: 1.3 {RATIO} (ref 1.4–2.6)
ALP SERPL-CCNC: 69 U/L (ref 42–98)
ALT SERPL-CCNC: 38 U/L (ref 10–40)
ANION GAP SERPL CALC-SCNC: 17 MMOL/L (ref 8–19)
APPEARANCE UR: ABNORMAL
AST SERPL-CCNC: 31 U/L (ref 15–50)
BASOPHILS # BLD AUTO: 0.06 10*3/UL (ref 0–0.2)
BASOPHILS NFR BLD AUTO: 0.5 % (ref 0–3)
BILIRUB SERPL-MCNC: 0.45 MG/DL (ref 0.2–1.3)
BILIRUB UR QL: NEGATIVE
BUN SERPL-MCNC: 17 MG/DL (ref 5–25)
BUN/CREAT SERPL: 21 {RATIO} (ref 6–20)
CALCIUM SERPL-MCNC: 9.4 MG/DL (ref 8.7–10.4)
CHLORIDE SERPL-SCNC: 105 MMOL/L (ref 99–111)
CHOLEST SERPL-MCNC: 173 MG/DL (ref 107–200)
CHOLEST/HDLC SERPL: 2.9 {RATIO} (ref 3–6)
COLOR UR: ABNORMAL
CONV ABS IMM GRAN: 0.05 10*3/UL (ref 0–0.2)
CONV BACTERIA: NEGATIVE
CONV CO2: 22 MMOL/L (ref 22–32)
CONV COLLECTION SOURCE (UA): ABNORMAL
CONV HYALINE CASTS IN URINE MICRO: ABNORMAL /[LPF]
CONV IMMATURE GRAN: 0.4 % (ref 0–1.8)
CONV TOTAL PROTEIN: 7.8 G/DL (ref 6.3–8.2)
CONV UROBILINOGEN IN URINE BY AUTOMATED TEST STRIP: 0.2 {EHRLICHU}/DL (ref 0.1–1)
CREAT UR-MCNC: 0.82 MG/DL (ref 0.5–0.9)
DEPRECATED RDW RBC AUTO: 43.9 FL (ref 36.4–46.3)
EOSINOPHIL # BLD AUTO: 0.24 10*3/UL (ref 0–0.7)
EOSINOPHIL # BLD AUTO: 1.9 % (ref 0–7)
ERYTHROCYTE [DISTWIDTH] IN BLOOD BY AUTOMATED COUNT: 12.5 % (ref 11.7–14.4)
EST. AVERAGE GLUCOSE BLD GHB EST-MCNC: 166 MG/DL
GFR SERPLBLD BASED ON 1.73 SQ M-ARVRAT: >60 ML/MIN/{1.73_M2}
GLOBULIN UR ELPH-MCNC: 3.4 G/DL (ref 2–3.5)
GLUCOSE SERPL-MCNC: 124 MG/DL (ref 65–99)
GLUCOSE UR QL: 500 MG/DL
HBA1C MFR BLD: 7.4 % (ref 3.5–5.7)
HCT VFR BLD AUTO: 44.8 % (ref 37–47)
HDLC SERPL-MCNC: 59 MG/DL (ref 40–60)
HGB BLD-MCNC: 14.8 G/DL (ref 12–16)
HGB UR QL STRIP: ABNORMAL
KETONES UR QL STRIP: NEGATIVE MG/DL
LDLC SERPL CALC-MCNC: 91 MG/DL (ref 70–100)
LEUKOCYTE ESTERASE UR QL STRIP: NEGATIVE
LYMPHOCYTES # BLD AUTO: 4.16 10*3/UL (ref 1–5)
LYMPHOCYTES NFR BLD AUTO: 33.1 % (ref 20–45)
MCH RBC QN AUTO: 31.3 PG (ref 27–31)
MCHC RBC AUTO-ENTMCNC: 33 G/DL (ref 33–37)
MCV RBC AUTO: 94.7 FL (ref 81–99)
MONOCYTES # BLD AUTO: 0.69 10*3/UL (ref 0.2–1.2)
MONOCYTES NFR BLD AUTO: 5.5 % (ref 3–10)
NEUTROPHILS # BLD AUTO: 7.36 10*3/UL (ref 2–8)
NEUTROPHILS NFR BLD AUTO: 58.6 % (ref 30–85)
NITRITE UR QL STRIP: NEGATIVE
NRBC CBCN: 0 % (ref 0–0.7)
OSMOLALITY SERPL CALC.SUM OF ELEC: 293 MOSM/KG (ref 273–304)
PH UR STRIP.AUTO: 5 [PH] (ref 5–8)
PLATELET # BLD AUTO: 379 10*3/UL (ref 130–400)
PMV BLD AUTO: 9.7 FL (ref 9.4–12.3)
POTASSIUM SERPL-SCNC: 4.2 MMOL/L (ref 3.5–5.3)
PROT UR QL: 30 MG/DL
RBC # BLD AUTO: 4.73 10*6/UL (ref 4.2–5.4)
RBC #/AREA URNS HPF: ABNORMAL /[HPF]
SODIUM SERPL-SCNC: 140 MMOL/L (ref 135–147)
SP GR UR: 1.03 (ref 1–1.03)
SQUAMOUS SPT QL MICRO: ABNORMAL /[HPF]
T4 FREE SERPL-MCNC: 1.1 NG/DL (ref 0.9–1.8)
TRIGL SERPL-MCNC: 114 MG/DL (ref 40–150)
TSH SERPL-ACNC: 1.33 M[IU]/L (ref 0.27–4.2)
VLDLC SERPL-MCNC: 23 MG/DL (ref 5–37)
WBC # BLD AUTO: 12.56 10*3/UL (ref 4.8–10.8)
WBC #/AREA URNS HPF: ABNORMAL /[HPF]

## 2021-03-22 ENCOUNTER — TELEMEDICINE CONVERTED (OUTPATIENT)
Dept: FAMILY MEDICINE CLINIC | Facility: CLINIC | Age: 41
End: 2021-03-22
Attending: PHYSICIAN ASSISTANT

## 2021-03-24 ENCOUNTER — HOSPITAL ENCOUNTER (OUTPATIENT)
Dept: GENERAL RADIOLOGY | Facility: HOSPITAL | Age: 41
Discharge: HOME | End: 2021-03-24
Attending: PHYSICIAN ASSISTANT
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
## 2021-05-13 NOTE — PROGRESS NOTES
Progress Note      Patient Name: Mary Go   Patient ID: [id number]   Sex: Female   YOB: 1980    Primary Care Provider: Paul Schaeffer PA-C   Referring Provider: Paul Schaeffer PA-C    Visit Date: May 18, 2020    Provider: Paul Schaeffer PA-C   Location: [facility]   Location Address: [address] [place] [address], [address]  [place], [place]  [id number]   Location Phone: [phone]          Chief Complaint  · 3 month follow up/lower back pain      History Of Present Illness  Mary Go is a 39 year old /White female who presents for evaluation and treatment of: 3 month follow up/lower back pain.      pt presents today for 3 month follow up and also experiencing lower back pain. pt stated for the last 2 months she has had lower back pain and pain radiating down rt leg.    no refills needed at this time.    Patient states that her blood sugars are running well.  She is due laboratories.    Patient is complaining of some lower back pain on the right side radiating down the right hip it does not go below the knee.  She states is on the lateral aspect of the leg.    She denies any urinary fecal incontinence.       Past Medical History  Disease Name Date Onset Notes   Allergy, Unspecified --  --    Anemia --  --    Arthritis --  --    Asthma --  --    Colitis, Ulcerative 2002 --    Diabetes mellitus, type 2 03/06/2018 --    Diabetes mellitus, type II --  --    Diabetic Eye Exam Last Completed 02/11/2019 --    Diabetic Foot Exam Last Completed 02/17/2020 --    Limb Swelling --  --    Obesity 03/06/2018 --    Pain in both knees, unspecified chronicity 03/20/2018 --    Pap smear for cervical cancer screening unknown per pt --    Psoriasis 07/30/2019 --    Reflux --  --    Screening Mammogram Never 02/17/2020 - baseline mammo screening ordered today, normal - repeat in 1 year    Seasonal allergies --  --    Shortness Of Air --  --    Shortness of Breath --  --    Sinusitis, Chronic --  --    Skin  disorder --  --    Tendonitis: insertional patellar tendonitis, bilateral 03/20/2018 --          Past Surgical History  Procedure Name Date Notes   Cervical polypectomy 2001 --    Colonoscopy 07/13/2018 Repeat in 3 years ([facility])   EGD 07/31/2018 --    Laproscopy 2001 --    Polypectomy 2002 Stomach polyp removed   [place] Tooth Extraction 1997  --          Medication List  Name Date Started Instructions   buspirone 10 mg oral tablet 02/17/2020 take 1 tablet by oral route 3 times a day PRN anxiety   Celexa 20 mg oral tablet 02/17/2020 take 1 tablet (20 mg) by oral route once daily   fluticasone propionate 50 mcg/actuation nasal spray,suspension 02/17/2020 spray 2 sprays (100 mcg) in each nostril by intranasal route once daily as needed   Jardiance 10 mg oral tablet 02/17/2020 take 1 tablet (10 mg) by oral route once daily in the morning for 30 days   lancets miscellaneous misc 01/12/2018 use as directed for 30 days three times daily   meloxicam 7.5 mg oral tablet 02/17/2020 take 1 tablet (7.5 mg) by oral route once daily   Ortho Tri-Cyclen (28) 0.18/0.215/0.25 mg-35 mcg (28) oral tablet 03/16/2020 take 1 tablet by oral route once daily   Ozempic 1 mg/dose (2 mg/1.5 mL) subcutaneous pen injector 02/17/2020 inject 1 mg by subcutaneous route once weekly on the same day of eachweek, in the abdomen, thighs, or upper arm rotating injection sites   Singulair 10 mg oral tablet 04/20/2020 take 1 tablet (10 mg) by oral route once daily in the evening for 30 days   True Metrix Glucose Test Strip miscellaneous strip 07/25/2019 Test blood sugar QID   Ventolin HFA 90 mcg/actuation inhalation HFA aerosol inhaler 04/20/2020 inhale 1 - 2 puffs (90 - 180 mcg) by inhalation route every 6 hours as needed         Allergy List  Allergen Name Date Reaction Notes   Latex --  --  --    Latex Exam Gloves --  --  --    PENICILLINS --  --  --    SULFA (SULFONAMIDES) --  --  --          Family Medical History  Disease Name Relative/Age Notes    Chronic Obstructive Pulmonary Disease  --    Emphysema  --    Breast Neoplasm, Malignant Grandmother (maternal)/  Grandmother (paternal)/   --    Stroke Grandfather (maternal)/  Sister/   --    Heart Disease Grandfather (maternal)/  Grandmother (maternal)/   --    Cancer, Unspecified Grandfather (paternal)/  Grandmother (maternal)/   --    Diabetes, unspecified type Mother/  Sister/   Mother; Sister   Diabetes  --    Family history of certain chronic disabling diseases; arthritis Mother/  Sister/   Mother; Sister   Osteoporosis Mother/  Sister/   Mother; Sister         Reproductive History  Menstrual   Age Menarche: 11   Pregnancy Summary   Total Pregnancies: 2 Full Term: 2 Premature: 0   Ab Induced: 0 Ab Spontaneous: 0 Ectopics: 0   Multiples: 0 Livin         Social History  Finding Status Start/Stop Quantity Notes   *Denies Alcohol Use --  --/-- --  --    Active but no formal exercise --  --/-- --  --    Alcohol Never --/-- --  2020 -    Alcohol Use Never --/-- --  does not drink   lives alone --  --/-- --  --    Recreational Drug Use Never --/-- --  no   Single --  --/-- --  --    Single. --  --/-- --  --    Tobacco Former  0.5 ppd former smoker  quit smoking in    Unemployed. --  --/-- --  --          Immunizations  NameDate Admin Mfg Trade Name Lot Number Route Inj VIS Given VIS Publication   Hepatitis A02019 SKB HAVRIX-ADULT [id number] IM  2019   Comments: tolerated well   Hepatitis A02018 SKB HAVRIX-ADULT   RD 2018 10/25/2011   Comments: Pt tolerated injection well         Review of Systems  · Constitutional  o Denies  o : fever, fatigue, weight loss, weight gain  · Cardiovascular  o Denies  o : lower extremity edema, claudication, chest pressure, palpitations  · Respiratory  o Denies  o : shortness of breath, wheezing, cough, hemoptysis, dyspnea on exertion  · Gastrointestinal  o Denies  o : nausea, vomiting, diarrhea, constipation, abdominal  "pain      Vitals  Date Time BP Position Site L\R Cuff Size HR RR TEMP (F) WT  HT  BMI kg/m2 BSA m2 O2 Sat HC       05/18/2020 02:34 /81 Sitting    125 - R   250lbs 2oz 5'  3\" 44.31 2.25 96 %          Physical Examination  · Constitutional  o Appearance  o : overweight, well developed  · Head and Face  o Head  o : normocephalic, atraumatic  · Neck  o Inspection/Palpation  o : normal appearance, no masses or tenderness, trachea midline  o Thyroid  o : gland size normal, nontender, no nodules or masses present on palpation  · Respiratory  o Respiratory Effort  o : breathing unlabored  o Inspection of Chest  o : chest rise symmetric bilaterally  o Auscultation of Lungs  o : clear to auscultation bilaterally throughout inspiration and expiration  · Cardiovascular  o Heart  o :   § Auscultation of Heart  § : regular rate and rhythm, no murmurs, gallops or rubs  o Peripheral Vascular System  o :   § Extremities  § : no edema  · Lymphatic  o Neck  o : no cervical lymphadenopathy, no supraclavicular lymphadenopathy  · Psychiatric  o Mood and Affect  o : mood normal, affect appropriate     BACK - palp tend along the lumbar spine into the right gluteal region, negative SLR           Assessment  · Diabetes mellitus, type 2     250.00/E11.9  · Lumbago     724.2/M54.5  · Obesity     278.00/E66.9      Plan  · Orders  o Lumbar Spine Complete [facility] ([id number]) - 724.2/M54.5 - 05/18/2020  o CMP [facility] ([id number]) - - 05/18/2020  o Hgb A1c [facility] ([id number]) - - 05/18/2020  o Thyroid Profile (87640, 93910, THYII) - - 05/18/2020  o Urinalysis with Reflex Microscopy if abnormal ([facility]) (67958) - - 05/18/2020  o ACO-39: Current medications updated and reviewed () - - 05/18/2020  o ACO-14: Influenza immunization was not administered for reasons documented () - - 05/18/2020  · Medications  o baclofen 10 mg oral tablet   SIG: take 1 tablet (10 mg) by oral route 4 times per day   DISP: (40) tablets with 0 refills  Prescribed on 05/18/2020 "     o Medications have been Reconciled  o Transition of Care or Provider Policy  · Instructions  o Continue blood sugar monitoring daily and record. Bring your log to office visits. Call the office for readings below 70 and above 250 or any complications.  o Daily foot care. Avoid walking barefoot. Annual Dilated Eye Exam.  o Discussed with patient blood pressure monitoring, hemoglobin A1C levels need to be below 7.0, and LDL (Lipid) goals below 70.  o Take all medications as prescribed/directed.  o Patient instructed/educated on their diet and exercise program.  o Patient was educated/instructed on their diagnosis, treatment and medications prior to discharge from the clinic today.  o Patient counseled to reduce calorie intake.  o Patient was instructed to exercise regularly.  o Discussed Covid-19 precautions including, but not limited to, social distancing, avoid touching your face, and hand washing.   · Disposition  o Call or Return if symptoms worsen or persist.  o F/U in 3 months.  o Care Transition            Electronically Signed by: Paul Schaeffer PA-C -Author on May 19, 2020 06:57:17 AM

## 2021-05-13 NOTE — PROGRESS NOTES
Progress Note      Patient Name: Mary Go   Patient ID: [id number]   Sex: Female   YOB: 1980    Primary Care Provider: Paul Schaeffer PA-C   Referring Provider: Paul Schaeffer PA-C    Visit Date: 2020    Provider: Paul Schaeffer PA-C   Location: [facility]   Location Address: [address] [place] [address], [address]  [place], [place]  [id number]   Location Phone: [phone]          Chief Complaint  · 3 month follow up      History Of Present Illness  Mary Go is a 39 year old /White female who presents for evaluation and treatment of:      Pt is here for a three month follow up, she is doing her PT and the traction has made her back and hip pain worse. They did start needling which helps some, but she would like to discuss.    She would like a refill on her Ketoconazole shampoo and Clobetasol Propionate topical solution that she uses for her eczema in her head.    Pt states that the needling helps more than the traction.    She states that she had lots of spasms in her back.    No CP, SOA, HA  Crying during the exam    Her mother just passed from renal failure.  She has also lost a child in the past.  Her father  6 years ago.           Past Medical History  Disease Name Date Onset Notes   Allergy, Unspecified --  --    Anemia --  --    Arthritis --  --    Asthma --  --    Colitis, Ulcerative 2002 --    Diabetes mellitus, type 2 2018 --    Diabetes Mellitus, Type II --  --    Diabetic Eye Exam Last Completed 2019 --    Diabetic Foot Exam Last Completed 2020 --    Limb Swelling --  --    Obesity 2018 --    Pain in both knees, unspecified chronicity 2018 --    Pap smear for cervical cancer screening unknown per pt --    Psoriasis 2019 --    Reflux --  --    Screening Mammogram Never 2020 - baseline mammo screening ordered today, normal - repeat in 1 year    Seasonal allergies --  --    Shortness Of Air --  --    Shortness of Breath --   --    Sinusitis, Chronic --  --    Skin disorder --  --    Tendonitis: insertional patellar tendonitis, bilateral 03/20/2018 --          Past Surgical History  Procedure Name Date Notes   Cervical polypectomy 2001 --    Colonoscopy 07/13/2018 Repeat in 3 years ([facility])   EGD 07/31/2018 --    Laproscopy 2001 --    Polypectomy 2002 Stomach polyp removed   [place] Tooth Extraction 1997  --          Medication List  Name Date Started Instructions   baclofen 10 mg oral tablet 07/20/2020 take 1 tablet (10 mg) by oral route 4 times per day   buspirone 10 mg oral tablet 02/17/2020 take 1 tablet by oral route 3 times a day PRN anxiety   Celexa 20 mg oral tablet 02/17/2020 take 1 tablet (20 mg) by oral route once daily   fluticasone propionate 50 mcg/actuation nasal spray,suspension 08/03/2020 spray 2 sprays (100 mcg) in each nostril by intranasal route once daily as needed   Jardiance 10 mg oral tablet 02/17/2020 take 1 tablet (10 mg) by oral route once daily in the morning for 30 days   lancets miscellaneous misc 01/12/2018 use as directed for 30 days three times daily   meloxicam 7.5 mg oral tablet 08/03/2020 take 1 tablet (7.5 mg) by oral route once daily   Ortho Tri-Cyclen (28) 0.18/0.215/0.25 mg-35 mcg (28) oral tablet 03/16/2020 take 1 tablet by oral route once daily   Ozempic 1 mg/dose (2 mg/1.5 mL) subcutaneous pen injector 02/17/2020 inject 1 mg by subcutaneous route once weekly on the same day of eachweek, in the abdomen, thighs, or upper arm rotating injection sites   Singulair 10 mg oral tablet 04/20/2020 take 1 tablet (10 mg) by oral route once daily in the evening for 30 days   True Metrix Glucose Test Strip miscellaneous strip 07/25/2019 Test blood sugar QID   Ventolin HFA 90 mcg/actuation inhalation HFA aerosol inhaler 04/20/2020 inhale 1 - 2 puffs (90 - 180 mcg) by inhalation route every 6 hours as needed         Allergy List  Allergen Name Date Reaction Notes   Latex --  --  --    Latex Exam Gloves --  --   --    PENICILLINS --  --  --    SULFA (SULFONAMIDES) --  --  --          Family Medical History  Disease Name Relative/Age Notes   Chronic Obstructive Pulmonary Disease  --    Emphysema  --    Breast Neoplasm, Malignant Grandmother (maternal)/  Grandmother (paternal)/   --    Stroke Grandfather (maternal)/  Sister/   --    Heart Disease Grandfather (maternal)/  Grandmother (maternal)/   --    Cancer, Unspecified Grandfather (paternal)/  Grandmother (maternal)/   --    Diabetes, unspecified type Mother/  Sister/   Mother; Sister   Diabetes  --    Family history of certain chronic disabling diseases; arthritis Mother/  Sister/   Mother; Sister   Osteoporosis Mother/  Sister/   Mother; Sister         Reproductive History  Menstrual   Age Menarche: 11   Pregnancy Summary   Total Pregnancies: 2 Full Term: 2 Premature: 0   Ab Induced: 0 Ab Spontaneous: 0 Ectopics: 0   Multiples: 0 Livin         Social History  Finding Status Start/Stop Quantity Notes   *Denies Alcohol Use --  --/-- --  --    Active but no formal exercise --  --/-- --  --    Alcohol Never --/-- --  2020 -    Alcohol Use Never --/-- --  does not drink   lives alone --  --/-- --  --    Recreational Drug Use Never --/-- --  no   Single --  --/-- --  --    Single. --  --/-- --  --    Tobacco Former  0.5 ppd former smoker  quit smoking in    Unemployed. --  --/-- --  --          Immunizations  NameDate Admin Mfg Trade Name Lot Number Route Inj VIS Given VIS Publication   Hepatitis A02019 SKB HAVRIX-ADULT [id number] IM  2019   Comments: tolerated well   Hepatitis A02018 SKB HAVRIX-ADULT  IM RD 2018 10/25/2011   Comments: Pt tolerated injection well         Review of Systems  · Constitutional  o Denies  o : fever, fatigue, weight loss, weight gain  · Cardiovascular  o Denies  o : lower extremity edema, claudication, chest pressure, palpitations  · Respiratory  o Denies  o : shortness of breath, wheezing,  "cough, hemoptysis, dyspnea on exertion  · Gastrointestinal  o Denies  o : nausea, vomiting, diarrhea, constipation, abdominal pain      Vitals  Date Time BP Position Site L\R Cuff Size HR RR TEMP (F) WT  HT  BMI kg/m2 BSA m2 O2 Sat HC       2020 11:39 /70 Sitting    102 - R   249lbs 4oz 5'  3\" 44.15 2.24 95 %          Physical Examination  · Constitutional  o Appearance  o : overweight, well developed  · Head and Face  o Head  o : normocephalic, atraumatic  · Neck  o Inspection/Palpation  o : normal appearance, no masses or tenderness, trachea midline  o Thyroid  o : gland size normal, nontender, no nodules or masses present on palpation  · Respiratory  o Respiratory Effort  o : breathing unlabored  o Inspection of Chest  o : chest rise symmetric bilaterally  o Auscultation of Lungs  o : clear to auscultation bilaterally throughout inspiration and expiration  · Cardiovascular  o Heart  o :   § Auscultation of Heart  § : regular rate and rhythm, no murmurs, gallops or rubs  o Peripheral Vascular System  o :   § Extremities  § : no edema  · Lymphatic  o Neck  o : no cervical lymphadenopathy, no supraclavicular lymphadenopathy  · Psychiatric  o Mood and Affect  o : mood normal, affect appropriate     Back - palp tend in the lumbar spine, into the right hip           Assessment  · Asthma     493.90/J45.909  · Diabetes mellitus, type 2     250.00/E11.9  · Lumbago     724.2/M54.5  · Obesity     278.00/E66.9  · Screening for depression     V79.0/Z13.89  · Allergy, Unspecified     995.3  · Grief reaction     309.0/F43.21  Mother recently       Plan  · Orders  o PHYSICAL THERAPY/OCCUPATIONAL THERAPY CONSULTATION (Evaluate and Treat) ([facility]) - 724.2/M54.5 - 2020   Right Hip  o ACO-18: Negative screen for clinical depression using a standardized tool () - V79.0/Z13.89 - 2020  o Free T4 (89328) - - 2020  o Hgb A1c [facility] (92841) - 250.00/E11.9 - 2020  o Physical, Primary Care Panel " (CBC, CMP, Lipid, TSH) [facility] (12806, 27366, 35407, 34291) - - 08/20/2020  o Urinalysis with Reflex Microscopy if abnormal ([facility]) (75999) - - 08/20/2020  o Vitamin D (25-Hydroxy) Level (64914) - - 08/20/2020  o ACO-39: Current medications updated and reviewed () - - 08/20/2020  · Medications  o ketoconazole 2 % topical shampoo   SIG: apply shampoo by topical route twice weekly with at least 3 days between each shampooing   DISP: (1) 120 ml bottle with 2 refills  Prescribed on 08/20/2020     o clobetasol 0.05 % topical foam   SIG: apply to the affected area(s) by topical route 2 times per day in the morning and evening   DISP: (1) 100 gm can with 2 refills  Prescribed on 08/20/2020     o Medications have been Reconciled  o Transition of Care or Provider Policy  · Instructions  o Continue blood sugar monitoring daily and record. Bring your log to office visits. Call the office for readings below 70 and above 250 or any complications.  o Daily foot care. Avoid walking barefoot. Annual Dilated Eye Exam.  o Discussed with patient blood pressure monitoring, hemoglobin A1C levels need to be below 7.0, and LDL (Lipid) goals below 70.  o Depression Screen completed and scanned into the EMR under the designated folder within the patient's documents.  o Today's PHQ-9 result is _5__  o Take all medications as prescribed/directed.  o Patient instructed/educated on their diet and exercise program.  o Patient was educated/instructed on their diagnosis, treatment and medications prior to discharge from the clinic today.  o Patient counseled to reduce calorie intake.  o Patient was instructed to exercise regularly.  o Discussed Covid-19 precautions including, but not limited to, social distancing, avoid touching your face, and hand washing.   · Disposition  o Call or Return if symptoms worsen or persist.  o F/U in 3 months.  o Care Transition            Electronically Signed by: Paul Schaeffer PA-C -Author on August 21, 2020  07:02:59 AM

## 2021-05-13 NOTE — PROGRESS NOTES
Progress Note      Patient Name: Mary Go   Patient ID: [id number]   Sex: Female   YOB: 1980    Primary Care Provider: Paul Schaeffer PA-C   Referring Provider: Paul Schaeffer PA-C    Visit Date: November 23, 2020    Provider: Paul Schaeffer PA-C   Location: [facility] [place]   Location Address: [address] [place] [address], [address]  [place], [place]  [id number]   Location Phone: [phone]          Chief Complaint  · 3 month follow up      History Of Present Illness  Video Conferencing Visit  Mary Go is a 40 year old /White female who is presenting for evaluation via video conferencing via [employer]. Verbal consent obtained before beginning visit.   The following staff were present during this visit: Alfa Phillips MA/Paul Schaeffer PA-C   Mary Go is a 40 year old /White female who presents for evaluation and treatment of: 3 month follow up      Pt presents today for a 3 month follow up.     Pt stated her Physical Therapist wanted her to let us know she is still having pain in her lower back and (R) hip. Pt notes the pain is much better since starting Physical Therapy.     Pt offers no other complaints at this time.     Labs-5/22/20  Mammo-3/9/20    Pt is able to drive now.  She states that moving makes it worse  Pt had a fall yesterday.             Past Medical History  Disease Name Date Onset Notes   Allergy, Unspecified --  --    Anemia --  --    Arthritis --  --    Asthma --  --    Colitis, Ulcerative 2002 --    Diabetes mellitus, type 2 03/06/2018 --    Diabetes Mellitus, Type II --  --    Diabetic Eye Exam Last Completed 02/11/2019 --    Diabetic Foot Exam Last Completed 02/17/2020 --    Limb Swelling --  --    Obesity 03/06/2018 --    Pain in both knees, unspecified chronicity 03/20/2018 --    Pap smear for cervical cancer screening unknown per pt --    Psoriasis 07/30/2019 --    Reflux --  --    Screening Mammogram Never 02/17/2020 - baseline mammo  screening ordered today, normal - repeat in 1 year    Seasonal allergies --  --    Shortness Of Air --  --    Shortness of Breath --  --    Sinusitis, Chronic --  --    Skin disorder --  --    Tendonitis: insertional patellar tendonitis, bilateral 03/20/2018 --          Past Surgical History  Procedure Name Date Notes   Cervical polypectomy 2001 --    Colonoscopy 07/13/2018 Repeat in 3 years ([facility])   EGD 07/31/2018 --    Laproscopy 2001 --    Polypectomy 2002 Stomach polyp removed   [place] Tooth Extraction 1997  --          Medication List  Name Date Started Instructions   ALBUTEROL SULFATE  ( 108 (90 BAS AERO 10/26/2020 INHALE 1-2 PUFFS BY MOUTH EVERY 6 HOURS IF NEEDED   BACLOFEN 10 MG TABS 10 TAB 10/26/2020 TAKE ONE TABLET BY MOUTH FOUR TIMES A DAY   buspirone 10 mg oral tablet 09/28/2020 take 1 tablet by oral route 3 times a day PRN anxiety   Celexa 20 mg oral tablet 09/28/2020 take 1 tablet (20 mg) by oral route once daily   fluticasone propionate 50 mcg/actuation nasal spray,suspension 08/03/2020 spray 2 sprays (100 mcg) in each nostril by intranasal route once daily as needed   Jardiance 10 mg oral tablet 02/17/2020 take 1 tablet (10 mg) by oral route once daily in the morning for 30 days   KETOCONAZOLE 2% SHAMPOO 2 SHAM 11/20/2020 APPLY TOPICALLY TO SCALP TWICE WEEKLY AS DIRECTED WAITING AT LEAST 3 DAYS BETWEEN SHAMPOOING   lancets miscellaneous misc 01/12/2018 use as directed for 30 days three times daily   meloxicam 7.5 mg oral tablet 08/03/2020 take 1 tablet (7.5 mg) by oral route once daily   Ortho Tri-Cyclen (28) 0.18/0.215/0.25 mg-35 mcg (28) oral tablet 03/16/2020 take 1 tablet by oral route once daily   Ozempic 1 mg/dose (2 mg/1.5 mL) subcutaneous pen injector 02/17/2020 inject 1 mg by subcutaneous route once weekly on the same day of eachweek, in the abdomen, thighs, or upper arm rotating injection sites   Singulair 10 mg oral tablet 04/20/2020 take 1 tablet (10 mg) by oral route once daily  in the evening for 30 days   True Metrix Glucose Test Strip miscellaneous strip 2020 Test blood sugar QID         Allergy List  Allergen Name Date Reaction Notes   Latex --  --  --    Latex Exam Gloves --  --  --    PENICILLINS --  --  --    SULFA (SULFONAMIDES) --  --  --        Allergies Reconciled  Family Medical History  Disease Name Relative/Age Notes   Chronic Obstructive Pulmonary Disease  --    Emphysema  --    Breast Neoplasm, Malignant Grandmother (maternal)/  Grandmother (paternal)/   --    Stroke Grandfather (maternal)/  Sister/   --    Heart Disease Grandfather (maternal)/  Grandmother (maternal)/   --    Cancer, Unspecified Grandfather (paternal)/  Grandmother (maternal)/   --    Diabetes, unspecified type Mother/  Sister/   Mother; Sister   Diabetes  --    Family history of certain chronic disabling diseases; arthritis Mother/  Sister/   Mother; Sister   Osteoporosis Mother/  Sister/   Mother; Sister         Reproductive History  Menstrual   Age Menarche: 11   Pregnancy Summary   Total Pregnancies: 2 Full Term: 2 Premature: 0   Ab Induced: 0 Ab Spontaneous: 0 Ectopics: 0   Multiples: 0 Livin         Social History  Finding Status Start/Stop Quantity Notes   *Denies Alcohol Use --  --/-- --  --    Active but no formal exercise --  --/-- --  --    Alcohol Never --/-- --  2020 -    Alcohol Use Never --/-- --  does not drink   lives alone --  --/-- --  --    Recreational Drug Use Never --/-- --  no   Single --  --/-- --  --    Single. --  --/-- --  --    Tobacco Former  0.5 ppd former smoker  quit smoking in    Unemployed. --  --/-- --  --          Immunizations  NameDate Admin Mfg Trade Name Lot Number Route Inj VIS Given VIS Publication   Hepatitis A02019 SKB HAVRIX-ADULT [id number] IM  2019   Comments: tolerated well   Hepatitis A02018 SKB HAVRIX-ADULT  IM RD 2018 10/25/2011   Comments: Pt tolerated injection well         Review of  Systems  · Constitutional  o Denies  o : fever, fatigue, weight loss, weight gain  · Cardiovascular  o Denies  o : lower extremity edema, claudication, chest pressure, palpitations  · Respiratory  o Denies  o : shortness of breath, wheezing, cough, hemoptysis, dyspnea on exertion  · Gastrointestinal  o Denies  o : nausea, vomiting, diarrhea, constipation, abdominal pain      Physical Examination  · Constitutional  o Appearance  o : well-nourished, no acute distress  · Head and Face  o Head  o :   § Inspection  § : atraumatic, normocephalic  · Respiratory  o Respiratory Effort  o : breathing comfortably, no cough  · Skin and Subcutaneous Tissue  o General Inspection  o : no visible rash, no lesions  · Neurologic  o Mental Status Examination  o :   § Orientation  § : grossly oriented to person, place and time, no facial droop          Assessment  · Anemia     285.9/D64.9  · Asthma     493.90/J45.909  · Diabetes mellitus, type 2     250.00/E11.9  · Lumbago     724.2/M54.5  · Obesity     278.00/E66.9  · Diabetes Mellitus, Type II     250.00/E11.9  · Allergy, Unspecified     995.3  · Lower back pain     724.2/M54.5  · Right hip pain     719.45/M25.551  · Abnormal MRI, lumbar spine     793.7/R93.7      Plan  · Orders  o Diabetes 1 Panel (CMP, Lipid, A1c) [facility] ([id number], [id number], 22740) - 250.00/E11.9 - 11/23/2020  o ACO-39: Current medications updated and reviewed (1159F, ) - - 11/23/2020  o NEUROSURGEON CONSULTATION (NEU) - 724.2/M54.5, 793.7/R93.7 - 11/23/2020  · Medications  o True Metrix Glucose Test Strip miscellaneous strip   SIG: Test blood sugar QID   DISP: (1) Bottle with 5 refills  Refilled on 11/23/2020     o Medications have been Reconciled  o Transition of Care or Provider Policy  · Instructions  o Continue blood sugar monitoring daily and record. Bring your log to office visits. Call the office for readings below 70 and above 250 or any complications.  o Daily foot care. Avoid walking barefoot. Annual  Dilated Eye Exam.  o Discussed with patient blood pressure monitoring, hemoglobin A1C levels need to be below 7.0, and LDL (Lipid) goals below 70.  o Take all medications as prescribed/directed.  o Patient instructed/educated on their diet and exercise program.  o Patient was educated/instructed on their diagnosis, treatment and medications prior to discharge from the clinic today.  o Patient counseled to reduce calorie intake.  o Patient was instructed to exercise regularly.  o Discussed Covid-19 precautions including, but not limited to, social distancing, avoid touching your face, and hand washing.   · Disposition  o Call or Return if symptoms worsen or persist.  o F/U in 3 months.  o Care Transition            Electronically Signed by: ION ClarosC -Author on November 23, 2020 11:33:27 AM

## 2021-05-14 VITALS — TEMPERATURE: 97.2 F | WEIGHT: 248.31 LBS | HEIGHT: 63 IN | BODY MASS INDEX: 44 KG/M2

## 2021-05-14 NOTE — PROGRESS NOTES
Progress Note      Patient Name: Mary Go   Patient ID: [id number]   Sex: Female   YOB: 1980    Primary Care Provider: Paul Schaeffer PA-C   Referring Provider: Paul Schaeffer PA-C    Visit Date: February 23, 2021    Provider: Paul Schaeffer PA-C   [place] [facility] [place]   Location Address: [address] [place] [address], [address]  [place], [place]  [id number]   Location Phone: [phone]          Chief Complaint  · 3 month follow up      History Of Present Illness  Video Conferencing Visit  Mary Go is a 40 year old /White female who is presenting for evaluation via video conferencing via [employer]. Verbal consent obtained before beginning visit.   The following staff were present during this visit: Alfa Phillips MA/Paul Schaeffer PA-C   Mary Go is a 40 year old /White female who presents for evaluation and treatment of: 3 month follow up      Pt presents today for a 3 month follow up via [employer].     Pt reports cat bite on (R) hand two weeks ago. Pt stated she went to [facility] then the ER a few days later, she has finished antibiotics and now has a white coating on her tongue. Pt thinks it may be thrush.     Pt offers no other complaints at this time.     Labs-12/2020    2 weeks ago today got bit by cat at [place] [place] [facility]    [facility] - doxycycline, flagyl and Bactroban oint  Next evening - worsening - went to ER - IV Levaquin and changed Doxy to Levaquin  Right Index finger    NIDDM - poor control       Past Medical History  Disease Name Date Onset Notes   Allergy, Unspecified --  --    Anemia --  --    Arthritis --  --    Asthma --  --    Colitis, Ulcerative 2002 --    Diabetes mellitus, type 2 03/06/2018 --    Diabetes Mellitus, Type II --  --    Diabetic Eye Exam Last Completed 02/11/2019 --    Diabetic Foot Exam Last Completed 02/17/2020 --    Limb Swelling --  --    Obesity 03/06/2018 --    Pain in both knees, unspecified chronicity  03/20/2018 --    Pap smear for cervical cancer screening unknown per pt --    Psoriasis 07/30/2019 --    Reflux --  --    Screening Mammogram Never 02/17/2020 - baseline mammo screening ordered today, normal - repeat in 1 year    Seasonal allergies --  --    Shortness Of Air --  --    Shortness of Breath --  --    Sinusitis, Chronic --  --    Skin disorder --  --    Tendonitis: insertional patellar tendonitis, bilateral 03/20/2018 --          Past Surgical History  Procedure Name Date Notes   Cervical polypectomy 2001 --    Colonoscopy 07/13/2018 Repeat in 3 years ([facility])   EGD 07/31/2018 --    Laproscopy 2001 --    Polypectomy 2002 Stomach polyp removed   [place] Tooth Extraction 1997  --          Medication List  Name Date Started Instructions   ALBUTEROL SULFATE  ( 108 (90 BAS AERO 10/26/2020 INHALE 1-2 PUFFS BY MOUTH EVERY 6 HOURS IF NEEDED   BACLOFEN 10 MG TABS 10 Tablet 01/21/2021 TAKE ONE TABLET BY MOUTH FOUR TIMES A DAY   buspirone 10 mg oral tablet 09/28/2020 take 1 tablet by oral route 3 times a day PRN anxiety   Celexa 20 mg oral tablet 09/28/2020 take 1 tablet (20 mg) by oral route once daily   Claritin 10 mg oral tablet  take 1 tablet (10 mg) by oral route once daily   FLUTICASONE PROP 50 MCG SPR 50 GIANCARLO 02/19/2021 SPRAY 2 SPRAYS IN EACH NOSTRIL ONCE DAILY IF NEEDED   KETOCONAZOLE 2% SHAMPOO 2 Shampoo 02/19/2021 APPLY TOPICALLY TO SCALP TWICE WEEKLY AS DIRECTED WAITING AT LEAST 3 DAYS BETWEEN SHAMPOOING   lancets miscellaneous misc 01/12/2018 use as directed for 30 days three times daily   MELOXICAM 7.5 MG TABLET 7.5 Tablet 02/19/2021 TAKE ONE TABLET BY MOUTH EVERY DAY   NORGESTIMATE-ETH ESTRADIOL 0.18/0.215/ Tablet 01/21/2021 TAKE 1 TABLET BY ORAL ROUTE ONCE DAILY   OZEMPIC 1 MG/DOSE SOPN 2 Solution Pen-injector 02/19/2021 INJECT 1 MG SUB-Q ONCE WEEKLY ON THE SAME DAY OF EACH WEEK INTO THE ABDOMEN, THIGHS, OR UPPER ARM ROTATING INJECTION SITES   Singulair 10 mg oral tablet 04/20/2020 take 1  tablet (10 mg) by oral route once daily in the evening for 30 days   True Metrix Glucose Test Strip miscellaneous strip 2020 Test blood sugar QID         Allergy List  Allergen Name Date Reaction Notes   Latex --  --  --    Latex Exam Gloves --  --  --    PENICILLINS --  --  --    SULFA (SULFONAMIDES) --  --  --        Allergies Reconciled  Family Medical History  Disease Name Relative/Age Notes   Chronic Obstructive Pulmonary Disease  --    Emphysema  --    Breast Neoplasm, Malignant Grandmother (maternal)/  Grandmother (paternal)/   --    Stroke Grandfather (maternal)/  Sister/   --    Heart Disease Grandfather (maternal)/  Grandmother (maternal)/   --    Cancer, Unspecified Grandfather (paternal)/  Grandmother (maternal)/   --    Diabetes, unspecified type Mother/  Sister/   Mother; Sister   Diabetes  --    Family history of certain chronic disabling diseases; arthritis Mother/  Sister/   Mother; Sister   Osteoporosis Mother/  Sister/   Mother; Sister         Reproductive History  Menstrual   Age Menarche: 11   Pregnancy Summary   Total Pregnancies: 2 Full Term: 2 Premature: 0   Ab Induced: 0 Ab Spontaneous: 0 Ectopics: 0   Multiples: 0 Livin         Social History  Finding Status Start/Stop Quantity Notes   *Denies Alcohol Use --  --/-- --  --    Active but no formal exercise --  --/-- --  --    Alcohol Never --/-- --  2020 -    Alcohol Use Never --/-- --  does not drink   lives alone --  --/-- --  --    Recreational Drug Use Never --/-- --  no   Single --  --/-- --  --    Single. --  --/-- --  --    Tobacco Former  0.5 ppd former smoker  quit smoking in    Unemployed. --  --/-- --  --          Immunizations  NameDate Admin Mfg Trade Name Lot Number Route Inj VIS Given VIS Publication   Hepatitis A02019 SKB HAVRIX-ADULT [id number] IM  2019   Comments: tolerated well   Hepatitis A02018 SKB HAVRIX-ADULT  IM RD 2018 10/25/2011   Comments: Pt tolerated  injection well         Review of Systems  · Constitutional  o Denies  o : fever, fatigue, weight loss, weight gain  · Cardiovascular  o Denies  o : lower extremity edema, claudication, chest pressure, palpitations  · Respiratory  o Denies  o : shortness of breath, wheezing, cough, hemoptysis, dyspnea on exertion  · Gastrointestinal  o Denies  o : nausea, vomiting, diarrhea, constipation, abdominal pain      Physical Examination  · Constitutional  o Appearance  o : well-nourished, no acute distress  · Head and Face  o Head  o :   § Inspection  § : atraumatic, normocephalic  · Respiratory  o Respiratory Effort  o : breathing comfortably, no cough  · Skin and Subcutaneous Tissue  o General Inspection  o : no visible rash, no lesions  · Neurologic  o Mental Status Examination  o :   § Orientation  § : grossly oriented to person, place and time, no facial droop     Right Index finger - mild edema, full ROM, no erythema           Assessment  · Diabetes mellitus, type 2     250.00/E11.9  · Obesity     278.00/E66.9  · Psoriasis     696.1/L40.9  · Allergy, Unspecified     995.3  · Cat bite       Bitten by cat, initial encounter     879.8/W55.01XA  · Cat bite of finger, subsequent encounter       Open bite of unspecified finger without damage to nail, subsequent encounter     V58.89/S61.259D  Bitten by cat, subsequent encounter     V58.89/W55.01XD  · Cellulitis     682.9/L03.90      Plan  · Orders  o ACO-39: Current medications updated and reviewed (, 1159F) - - 02/23/2021  · Medications  o levofloxacin 500 mg oral tablet   SIG: take 1 tablet (500 mg) by oral route once daily for 7 days   DISP: (7) Tablet with 0 refills  Prescribed on 02/23/2021     o Diflucan 150 mg oral tablet   SIG: take 1 tablet by oral route daily for 2 days   DISP: (2) Tablet with 0 refills  Prescribed on 02/23/2021     o Medications have been Reconciled  o Transition of Care or Provider Policy  · Instructions  o Continue blood sugar monitoring  daily and record. Bring your log to office visits. Call the office for readings below 70 and above 250 or any complications.  o Daily foot care. Avoid walking barefoot. Annual Dilated Eye Exam.  o Discussed with patient blood pressure monitoring, hemoglobin A1C levels need to be below 7.0, and LDL (Lipid) goals below 70.  o Take all medications as prescribed/directed.  o Patient instructed/educated on their diet and exercise program.  o Patient was educated/instructed on their diagnosis, treatment and medications prior to discharge from the clinic today.  o Discussed Covid-19 precautions including, but not limited to, social distancing, avoid touching your face, and hand washing.   o TD is UTD  · Disposition  o Call or Return if symptoms worsen or persist.  o F/U 2 weeks  o Care Transition            Electronically Signed by: Paul Schaeffer PA-C -Author on February 23, 2021 11:26:24 AM

## 2021-05-14 NOTE — PROGRESS NOTES
Progress Note      Patient Name: Mary Go   Patient ID: [id number]   Sex: Female   YOB: 1980    Primary Care Provider: Paul Schaeffer PA-C   Referring Provider: Paul Schaeffer PA-C    Visit Date: March 2, 2021    Provider: Paul Schaeffer PA-C   Location: [facility] [place]   Location Address: [address] [place] [address], [address]  [place], [place]  [id number]   Location Phone: [phone]          Chief Complaint  · 1 week f/u on cat bite      History Of Present Illness  Video Conferencing Visit  Mary Go is a 40 year old /White female who is presenting for evaluation via video conferencing via [employer]. Verbal consent obtained before beginning visit.   The following staff were present during this visit: Anita Reinoso CMA/Paul Schaeffer PA-C   Mary Go is a 40 year old /White female who presents for evaluation and treatment of: 1 week f/u on cat bite.      pt presents today for 1 week f/u on cat bite via [employer].    pt was bitten 3 weeks ago on rt hand by a cat. pt did go to [facility] and then ER a few days later. pt was given antibiotics.    pt states it feels like something is in the wound, xray was done at ER.    pt states her hand is still swollen some, denies any redness and heat to area.    No CP, SOA, HA    No fever, chills  No increased swelling, redness or pain.  Minor tenderness               Past Medical History  Disease Name Date Onset Notes   Allergy, Unspecified --  --    Anemia --  --    Arthritis --  --    Asthma --  --    Colitis, Ulcerative 2002 --    Diabetes mellitus, type 2 03/06/2018 --    Diabetes Mellitus, Type II --  --    Diabetic Eye Exam Last Completed 02/11/2019 --    Diabetic Foot Exam Last Completed 02/17/2020 --    Limb Swelling --  --    Obesity 03/06/2018 --    Pain in both knees, unspecified chronicity 03/20/2018 --    Pap smear for cervical cancer screening unknown per pt --    Psoriasis 07/30/2019 --    Reflux --  --     Screening Mammogram Never 02/17/2020 - baseline mammo screening ordered today, normal - repeat in 1 year    Seasonal allergies --  --    Shortness Of Air --  --    Shortness of Breath --  --    Sinusitis, Chronic --  --    Skin disorder --  --    Tendonitis: insertional patellar tendonitis, bilateral 03/20/2018 --          Past Surgical History  Procedure Name Date Notes   Cervical polypectomy 2001 --    Colonoscopy 07/13/2018 Repeat in 3 years ([facility])   EGD 07/31/2018 --    Laproscopy 2001 --    Polypectomy 2002 Stomach polyp removed   [place] Tooth Extraction 1997  --          Medication List  Name Date Started Instructions   ALBUTEROL SULFATE  ( 108 (90 BAS AERO 10/26/2020 INHALE 1-2 PUFFS BY MOUTH EVERY 6 HOURS IF NEEDED   BACLOFEN 10 MG TABS 10 Tablet 01/21/2021 TAKE ONE TABLET BY MOUTH FOUR TIMES A DAY   buspirone 10 mg oral tablet 09/28/2020 take 1 tablet by oral route 3 times a day PRN anxiety   Celexa 20 mg oral tablet 09/28/2020 take 1 tablet (20 mg) by oral route once daily   Claritin 10 mg oral tablet  take 1 tablet (10 mg) by oral route once daily   Diflucan 150 mg oral tablet 02/23/2021 take 1 tablet by oral route daily for 2 days   FLUTICASONE PROP 50 MCG SPR 50 GIANCARLO 02/19/2021 SPRAY 2 SPRAYS IN EACH NOSTRIL ONCE DAILY IF NEEDED   KETOCONAZOLE 2% SHAMPOO 2 Shampoo 02/19/2021 APPLY TOPICALLY TO SCALP TWICE WEEKLY AS DIRECTED WAITING AT LEAST 3 DAYS BETWEEN SHAMPOOING   lancets miscellaneous misc 01/12/2018 use as directed for 30 days three times daily   levofloxacin 500 mg oral tablet 02/23/2021 take 1 tablet (500 mg) by oral route once daily for 7 days   MELOXICAM 7.5 MG TABLET 7.5 Tablet 02/19/2021 TAKE ONE TABLET BY MOUTH EVERY DAY   NORGESTIMATE-ETH ESTRADIOL 0.18/0.215/ Tablet 01/21/2021 TAKE 1 TABLET BY ORAL ROUTE ONCE DAILY   OZEMPIC 1 MG/DOSE SOPN 2 Solution Pen-injector 02/19/2021 INJECT 1 MG SUB-Q ONCE WEEKLY ON THE SAME DAY OF EACH WEEK INTO THE ABDOMEN, THIGHS, OR UPPER ARM ROTATING  INJECTION SITES   Singulair 10 mg oral tablet 2020 take 1 tablet (10 mg) by oral route once daily in the evening for 30 days   True Metrix Glucose Test Strip miscellaneous strip 2020 Test blood sugar QID         Allergy List  Allergen Name Date Reaction Notes   Latex --  --  --    Latex Exam Gloves --  --  --    PENICILLINS --  --  --    SULFA (SULFONAMIDES) --  --  --        Allergies Reconciled  Family Medical History  Disease Name Relative/Age Notes   Chronic Obstructive Pulmonary Disease  --    Emphysema  --    Breast Neoplasm, Malignant Grandmother (maternal)/  Grandmother (paternal)/   --    Stroke Grandfather (maternal)/  Sister/   --    Heart Disease Grandfather (maternal)/  Grandmother (maternal)/   --    Cancer, Unspecified Grandfather (paternal)/  Grandmother (maternal)/   --    Diabetes, unspecified type Mother/  Sister/   Mother; Sister   Diabetes  --    Family history of certain chronic disabling diseases; arthritis Mother/  Sister/   Mother; Sister   Osteoporosis Mother/  Sister/   Mother; Sister         Reproductive History  Menstrual   Age Menarche: 11   Pregnancy Summary   Total Pregnancies: 2 Full Term: 2 Premature: 0   Ab Induced: 0 Ab Spontaneous: 0 Ectopics: 0   Multiples: 0 Livin         Social History  Finding Status Start/Stop Quantity Notes   *Denies Alcohol Use --  --/-- --  --    Active but no formal exercise --  --/-- --  --    Alcohol Never --/-- --  2020 -    Alcohol Use Never --/-- --  does not drink   lives alone --  --/-- --  --    Recreational Drug Use Never --/-- --  no   Single --  --/-- --  --    Single. --  --/-- --  --    Tobacco Former  0.5 ppd former smoker  quit smoking in    Unemployed. --  --/-- --  --          Immunizations  NameDate Admin Mfg Trade Name Lot Number Route Inj VIS Given VIS Publication   Hepatitis A02019 NUVIA HAVRIX-ADULT [id number] IM  2019   Comments: tolerated well   Hepatitis A02018 NUVIA  HAVRIX-ADULT  IM RD 04/27/2018 10/25/2011   Comments: Pt tolerated injection well         Review of Systems  · Constitutional  o Denies  o : fever, fatigue, weight loss, weight gain  · Cardiovascular  o Denies  o : lower extremity edema, claudication, chest pressure, palpitations  · Respiratory  o Denies  o : shortness of breath, wheezing, cough, hemoptysis, dyspnea on exertion  · Gastrointestinal  o Denies  o : nausea, vomiting, diarrhea, constipation, abdominal pain      Physical Examination  · Constitutional  o Appearance  o : overweight, well developed, alert, in no acute distress  · Head and Face  o Head  o :   § Inspection  § : atraumatic, normocephalic  · Respiratory  o Respiratory Effort  o : breathing comfortably, no cough  · Skin and Subcutaneous Tissue  o General Inspection  o : no visible rash, no lesions  · Neurologic  o Mental Status Examination  o :   § Orientation  § : grossly oriented to person, place and time, no facial droop     Finger - healing well, cont observation           Assessment  · Need for influenza vaccination     V04.81/Z23  · Visit for screening mammogram     V76.12/Z12.31  · Diabetes Mellitus, Type II     250.00/E11.9  · Cat bite       Bitten by cat, initial encounter     879.8/W55.01XA  Finger      Plan  · Orders  o ACO-14: Influenza immunization was not administered for reasons documented () - V04.81/Z23 - 03/02/2021   telehealth  o Screening Mammography; Bilateral 3D (96645, 67592, ) - V76.12/Z12.31 - 03/02/2021  o Free T4 (64940) - - 03/02/2021  o Hgb A1c [facility] ([id number]) - - 03/02/2021  o Physical, Primary Care Panel (CBC, CMP, Lipid, TSH) [facility] (30774, 16615, 48592, 56874) - - 03/02/2021  o Urinalysis with Reflex Microscopy ([facility]) (92287) - - 03/02/2021  o Vitamin D (25-Hydroxy) Level (68480) - - 03/02/2021  o ACO-39: Current medications updated and reviewed (1159F, ) - - 03/02/2021  o Microalbumin urine (78749) - - 03/02/2021  · Medications  o Medications have  been Reconciled  o Transition of Care or Provider Policy  · Instructions  o Flu vaccine declined.  o Take all medications as prescribed/directed.  o Patient was educated/instructed on their diagnosis, treatment and medications prior to discharge from the clinic today.  o Discussed Covid-19 precautions including, but not limited to, social distancing, avoid touching your face, and hand washing.   o if no better then ortho  · Disposition  o Call or Return if symptoms worsen or persist.  o F/U 2 weeks  o Care Transition            Electronically Signed by: Paul Schaeffer PA-C -Author on March 2, 2021 03:59:13 PM

## 2021-05-14 NOTE — PROGRESS NOTES
Progress Note      Patient Name: Mary Go   Patient ID: [id number]   Sex: Female   YOB: 1980    Primary Care Provider: Paul Schaeffer PA-C   Referring Provider: Paul Schaeffer PA-C    Visit Date: March 22, 2021    Provider: Paul Schaeffer PA-C   Location: [facility] [place]   Location Address: [address] [place] [address], [address]  [place], [place]  [id number]   Location Phone: [phone]          Chief Complaint  · 2 week follow up, cat bite      History Of Present Illness  Video Conferencing Visit  Mary Go is a 40 year old /White female who is presenting for evaluation via video conferencing via [employer]. Verbal consent obtained before beginning visit.   The following staff were present during this visit: Alfa Phillips MA/Paul Schaeffer PA-C   Mary Go is a 40 year old /White female who presents for evaluation and treatment of: 2 week follow up, cat bite      Pt presents today for a 2 week follow up on cat bite. Pt was bit by cat about 5 weeks ago on (R) hand.     Pt notes she is doing about the same, pt stated the wound has healed but she has bump left in scar. Pt notes it is very tender with motion but otherwise fine.     Pt denies any redness or heat to the area.     Labs-3/11/21    No redness warmth    Dr. Ott - [facility] pt    wed - mammogram this week    Vit D def - taking vit d 50,000 weekly         Past Medical History  Disease Name Date Onset Notes   Allergy, Unspecified --  --    Anemia --  --    Arthritis --  --    Asthma --  --    Colitis, Ulcerative 2002 --    Diabetes mellitus, type 2 03/06/2018 --    Diabetes Mellitus, Type II --  --    Diabetic Eye Exam Last Completed 02/11/2019 --    Diabetic Foot Exam Last Completed 02/17/2020 --    Limb Swelling --  --    Obesity 03/06/2018 --    Pain in both knees, unspecified chronicity 03/20/2018 --    Pap smear for cervical cancer screening unknown per pt --    Psoriasis 07/30/2019 --     Reflux --  --    Screening Mammogram Never 02/17/2020 - baseline mammo screening ordered today, normal - repeat in 1 year    Seasonal allergies --  --    Shortness Of Air --  --    Shortness of Breath --  --    Sinusitis, Chronic --  --    Skin disorder --  --    Tendonitis: insertional patellar tendonitis, bilateral 03/20/2018 --          Past Surgical History  Procedure Name Date Notes   Cervical polypectomy 2001 --    Colonoscopy 07/13/2018 Repeat in 3 years ([facility])   EGD 07/31/2018 --    Laproscopy 2001 --    Polypectomy 2002 Stomach polyp removed   [place] Tooth Extraction 1997  --          Medication List  Name Date Started Instructions   ALBUTEROL SULFATE  ( 108 (90 BAS AERO 10/26/2020 INHALE 1-2 PUFFS BY MOUTH EVERY 6 HOURS IF NEEDED   BACLOFEN 10 MG TABS 10 Tablet 01/21/2021 TAKE ONE TABLET BY MOUTH FOUR TIMES A DAY   BUSPIRONE HCL 10 MG TABLET 10 Tablet 03/15/2021 TAKE ONE TABLET BY MOUTH THREE TIMES A DAY   CITALOPRAM HBR 20 MG TABLET 20 Tablet 03/15/2021 TAKE ONE TABLET BY MOUTH EVERY DAY   Claritin 10 mg oral tablet  take 1 tablet (10 mg) by oral route once daily   FLUTICASONE PROP 50 MCG SPR 50 GIANCARLO 02/19/2021 SPRAY 2 SPRAYS IN EACH NOSTRIL ONCE DAILY IF NEEDED   fluticasone propionate 50 mcg/actuation nasal spray,suspension 03/15/2021 SPRAY 2 SPRAYS IN EACH NOSTRIL ONCE DAILY IF NEEDED   FreeStyle Lite Meter miscellaneous kit 03/22/2021 use as directed to check BS bid, Dx: E11.9   FreeStyle Lite Strips miscellaneous strip 03/22/2021 use as directed to check BS bid, Dx: E11.9   KETOCONAZOLE 2% SHAMPOO 2 Shampoo 02/19/2021 APPLY TOPICALLY TO SCALP TWICE WEEKLY AS DIRECTED WAITING AT LEAST 3 DAYS BETWEEN SHAMPOOING   lancets miscellaneous misc 01/12/2018 use as directed for 30 days three times daily   levofloxacin 500 mg oral tablet 02/23/2021 take 1 tablet (500 mg) by oral route once daily for 7 days   MELOXICAM 7.5 MG TABLET 7.5 Tablet 02/19/2021 TAKE ONE TABLET BY MOUTH EVERY DAY    NORGESTIMATE-ETH ESTRADIOL 0.18/0.215/ Tablet 2021 TAKE 1 TABLET BY ORAL ROUTE ONCE DAILY   OZEMPIC 1 MG/DOSE SOPN 2 Solution Pen-injector 2021 INJECT 1 MG SUB-Q ONCE WEEKLY ON THE SAME DAY OF EACH WEEK INTO THE ABDOMEN, THIGHS, OR UPPER ARM ROTATING INJECTION SITES   Singulair 10 mg oral tablet 2020 take 1 tablet (10 mg) by oral route once daily in the evening for 30 days   True Metrix Glucose Test Strip miscellaneous strip 2020 Test blood sugar QID   Vitamin D2 1,250 mcg (50,000 unit) oral capsule 2021 take 1 capsule by oral route every 7 days         Allergy List  Allergen Name Date Reaction Notes   Latex --  --  --    Latex Exam Gloves --  --  --    PENICILLINS --  --  --    SULFA (SULFONAMIDES) --  --  --        Allergies Reconciled  Family Medical History  Disease Name Relative/Age Notes   Chronic Obstructive Pulmonary Disease  --    Emphysema  --    Breast Neoplasm, Malignant Grandmother (maternal)/  Grandmother (paternal)/   --    Stroke Grandfather (maternal)/  Sister/   --    Heart Disease Grandfather (maternal)/  Grandmother (maternal)/   --    Cancer, Unspecified Grandfather (paternal)/  Grandmother (maternal)/   --    Diabetes, unspecified type Mother/  Sister/   Mother; Sister   Diabetes  --    Family history of certain chronic disabling diseases; arthritis Mother/  Sister/   Mother; Sister   Osteoporosis Mother/  Sister/   Mother; Sister         Reproductive History  Menstrual   Age Menarche: 11   Pregnancy Summary   Total Pregnancies: 2 Full Term: 2 Premature: 0   Ab Induced: 0 Ab Spontaneous: 0 Ectopics: 0   Multiples: 0 Livin         Social History  Finding Status Start/Stop Quantity Notes   *Denies Alcohol Use --  --/-- --  --    Active but no formal exercise --  --/-- --  --    Alcohol Never --/-- --  2020 -    Alcohol Use Never --/-- --  does not drink   lives alone --  --/-- --  --    Recreational Drug Use Never --/-- --  no   Single --  --/-- --  --     Single. --  --/-- --  --    Tobacco Former 16/34 0.5 ppd former smoker  quit smoking in 2014   Unemployed. --  --/-- --  --          Immunizations  NameDate Admin Mfg Trade Name Lot Number Route Inj VIS Given VIS Publication   Hepatitis A01/21/2019 SKB HAVRIX-ADULT [id number] IM  01/21/2019 07/20/2016   Comments: tolerated well   Hepatitis A04/27/2018 SKB HAVRIX-ADULT  IM RD 04/27/2018 10/25/2011   Comments: Pt tolerated injection well         Review of Systems  · Constitutional  o Denies  o : fever, fatigue, weight loss, weight gain  · Cardiovascular  o Denies  o : lower extremity edema, claudication, chest pressure, palpitations  · Respiratory  o Denies  o : shortness of breath, wheezing, cough, hemoptysis, dyspnea on exertion  · Gastrointestinal  o Denies  o : nausea, vomiting, diarrhea, constipation, abdominal pain      Physical Examination  · Constitutional  o Appearance  o : overweight, well developed, alert, in no acute distress  · Head and Face  o Head  o :   § Inspection  § : atraumatic, normocephalic  · Respiratory  o Respiratory Effort  o : breathing comfortably, no cough  · Skin and Subcutaneous Tissue  o General Inspection  o : no visible rash, no lesions  · Neurologic  o Mental Status Examination  o :   § Orientation  § : grossly oriented to person, place and time, no facial droop          Assessment  · Obesity     278.00/E66.9  · Psoriasis     696.1/L40.9  · Diabetes Mellitus, Type II     250.00/E11.9  · Cat bite       Bitten by cat, initial encounter     879.8/W55.01XA      Plan  · Orders  o ACO-39: Current medications updated and reviewed (1159F, ) - - 03/22/2021  · Medications  o Medications have been Reconciled  o Transition of Care or Provider Policy  · Instructions  o Take all medications as prescribed/directed.  o Patient instructed/educated on their diet and exercise program.  o Patient was educated/instructed on their diagnosis, treatment and medications prior to discharge from the  clinic today.  o Patient counseled to reduce calorie intake.  o Patient was instructed to exercise regularly.  o Discussed Covid-19 precautions including, but not limited to, social distancing, avoid touching your face, and hand washing.   o Pt refuses hand therapist  · Disposition  o Call or Return if symptoms worsen or persist.  o Care Transition            Electronically Signed by: Paul Schaeffer PA-C -Author on March 22, 2021 01:22:12 PM

## 2021-05-15 VITALS
HEART RATE: 109 BPM | HEIGHT: 63 IN | DIASTOLIC BLOOD PRESSURE: 76 MMHG | WEIGHT: 245 LBS | BODY MASS INDEX: 43.41 KG/M2 | OXYGEN SATURATION: 98 % | SYSTOLIC BLOOD PRESSURE: 124 MMHG

## 2021-05-15 VITALS
BODY MASS INDEX: 43.94 KG/M2 | WEIGHT: 248 LBS | HEIGHT: 63 IN | DIASTOLIC BLOOD PRESSURE: 64 MMHG | HEART RATE: 101 BPM | SYSTOLIC BLOOD PRESSURE: 100 MMHG | OXYGEN SATURATION: 98 %

## 2021-05-15 VITALS
WEIGHT: 247.25 LBS | HEART RATE: 120 BPM | SYSTOLIC BLOOD PRESSURE: 132 MMHG | BODY MASS INDEX: 43.81 KG/M2 | OXYGEN SATURATION: 94 % | DIASTOLIC BLOOD PRESSURE: 89 MMHG | HEIGHT: 63 IN

## 2021-05-15 VITALS
OXYGEN SATURATION: 95 % | HEIGHT: 63 IN | SYSTOLIC BLOOD PRESSURE: 133 MMHG | BODY MASS INDEX: 44.16 KG/M2 | WEIGHT: 249.25 LBS | DIASTOLIC BLOOD PRESSURE: 70 MMHG | HEART RATE: 102 BPM

## 2021-05-15 VITALS
HEART RATE: 80 BPM | DIASTOLIC BLOOD PRESSURE: 61 MMHG | OXYGEN SATURATION: 97 % | SYSTOLIC BLOOD PRESSURE: 117 MMHG | WEIGHT: 237 LBS | BODY MASS INDEX: 41.99 KG/M2 | HEIGHT: 63 IN

## 2021-05-15 VITALS
HEART RATE: 94 BPM | SYSTOLIC BLOOD PRESSURE: 141 MMHG | OXYGEN SATURATION: 97 % | WEIGHT: 234 LBS | DIASTOLIC BLOOD PRESSURE: 84 MMHG | BODY MASS INDEX: 41.46 KG/M2 | HEIGHT: 63 IN

## 2021-05-15 VITALS
WEIGHT: 230 LBS | DIASTOLIC BLOOD PRESSURE: 76 MMHG | HEIGHT: 63 IN | SYSTOLIC BLOOD PRESSURE: 118 MMHG | HEART RATE: 93 BPM | BODY MASS INDEX: 40.75 KG/M2 | OXYGEN SATURATION: 97 %

## 2021-05-15 VITALS — HEART RATE: 102 BPM | OXYGEN SATURATION: 97 %

## 2021-05-15 VITALS
OXYGEN SATURATION: 96 % | HEART RATE: 125 BPM | DIASTOLIC BLOOD PRESSURE: 81 MMHG | BODY MASS INDEX: 44.32 KG/M2 | SYSTOLIC BLOOD PRESSURE: 137 MMHG | WEIGHT: 250.12 LBS | HEIGHT: 63 IN

## 2021-05-16 VITALS — WEIGHT: 228.25 LBS | OXYGEN SATURATION: 96 % | BODY MASS INDEX: 40.44 KG/M2 | HEIGHT: 63 IN | HEART RATE: 94 BPM

## 2021-05-16 VITALS
TEMPERATURE: 97.8 F | SYSTOLIC BLOOD PRESSURE: 124 MMHG | HEART RATE: 108 BPM | HEIGHT: 63 IN | WEIGHT: 230.37 LBS | OXYGEN SATURATION: 96 % | DIASTOLIC BLOOD PRESSURE: 72 MMHG | BODY MASS INDEX: 40.82 KG/M2

## 2021-05-16 VITALS
WEIGHT: 228 LBS | SYSTOLIC BLOOD PRESSURE: 120 MMHG | DIASTOLIC BLOOD PRESSURE: 78 MMHG | HEIGHT: 63 IN | BODY MASS INDEX: 40.4 KG/M2

## 2021-05-16 VITALS
SYSTOLIC BLOOD PRESSURE: 110 MMHG | DIASTOLIC BLOOD PRESSURE: 72 MMHG | WEIGHT: 228 LBS | BODY MASS INDEX: 40.4 KG/M2 | HEIGHT: 63 IN | HEART RATE: 86 BPM | OXYGEN SATURATION: 97 %

## 2021-05-16 VITALS
WEIGHT: 234 LBS | OXYGEN SATURATION: 98 % | HEART RATE: 92 BPM | HEIGHT: 63 IN | BODY MASS INDEX: 41.46 KG/M2 | TEMPERATURE: 97.6 F | SYSTOLIC BLOOD PRESSURE: 124 MMHG | DIASTOLIC BLOOD PRESSURE: 76 MMHG

## 2021-05-16 VITALS
HEIGHT: 63 IN | SYSTOLIC BLOOD PRESSURE: 130 MMHG | OXYGEN SATURATION: 97 % | DIASTOLIC BLOOD PRESSURE: 82 MMHG | HEART RATE: 100 BPM | WEIGHT: 229 LBS | BODY MASS INDEX: 40.57 KG/M2

## 2021-05-16 VITALS — HEART RATE: 82 BPM | BODY MASS INDEX: 40.82 KG/M2 | WEIGHT: 230.37 LBS | HEIGHT: 63 IN | OXYGEN SATURATION: 97 %

## 2021-05-16 VITALS
HEIGHT: 63 IN | BODY MASS INDEX: 40.29 KG/M2 | WEIGHT: 227.37 LBS | SYSTOLIC BLOOD PRESSURE: 116 MMHG | DIASTOLIC BLOOD PRESSURE: 76 MMHG | HEART RATE: 101 BPM | OXYGEN SATURATION: 97 %

## 2021-05-16 VITALS
RESPIRATION RATE: 16 BRPM | OXYGEN SATURATION: 95 % | DIASTOLIC BLOOD PRESSURE: 72 MMHG | TEMPERATURE: 98.3 F | HEART RATE: 82 BPM | WEIGHT: 238.25 LBS | BODY MASS INDEX: 42.21 KG/M2 | SYSTOLIC BLOOD PRESSURE: 146 MMHG | HEIGHT: 63 IN

## 2021-05-16 VITALS
HEIGHT: 63 IN | BODY MASS INDEX: 39.6 KG/M2 | WEIGHT: 223.5 LBS | SYSTOLIC BLOOD PRESSURE: 119 MMHG | DIASTOLIC BLOOD PRESSURE: 65 MMHG | HEART RATE: 87 BPM

## 2021-05-16 VITALS
WEIGHT: 220.25 LBS | BODY MASS INDEX: 39.02 KG/M2 | SYSTOLIC BLOOD PRESSURE: 97 MMHG | HEART RATE: 86 BPM | DIASTOLIC BLOOD PRESSURE: 58 MMHG | HEIGHT: 63 IN
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
## 2021-05-28 NOTE — PROGRESS NOTES
Patient: JOSHUA TANG     Acct: [id number]     Report: #[id number]  UNIT #: [id number]     : 1980    Encounter Date:2018  PRIMARY CARE: VIJAYA HEARN  ***Signed***  --------------------------------------------------------------------------------------------------------------------  Encounter Date      2018            Reason for Visit      This patient returns to the office today for follow-up review for continuous     glucose sensor placement.  She is a 37-year-old female patient who was recently     diagnosed with type II diabetes.  Upon diagnosis in January of this year, the     patient had an A1c of 12.6 and was started on metformin which was not tolerated     due to GI distress.  Because of this her medications were changed and she is     currently taking Trulicity o.75 mg weekly and Jardiance 10 mg/day.  Due to     problems with the patient's insurance she reports that she is being switched to     a new once weekly GLP 1 agonist, Ozempic.  She'll begin taking this new     medication on Monday.            To to gain a better perspective on her overall glucose control she was placed     on the amita continuous glucose sensor at her last appointment.  The device was     worn for a 14 day period of time to determine if the patient is experiencing     any problematic hyper or hypoglycemia and to determine if her current treatment     therapy is appropriate for the patient.  The device was uploaded and reviewed     with the patient today.  The device indicates excellent glucose control.  The     average glucose is 111 mg/dL.  99% of her time she was found to be within a     target range between 70 and 180 mg/dL.  1% of glucose readings were above 180 mg    /dL.  There were no episodes of hypoglycemia noted during this period of time.              An A1c collected on 18 was 6.1% further validating the patient's progress     in achieving glycemic control.  She is working hard to control  her carbohydrate     intake.  Since being diagnosed with the diabetes in January she has had a 30     pound weight loss.            History            History: She denies any health issues or changes since last being seen.            Allergies/Medications      Allergies:        Coded Allergies:             LATEX (Verified  Allergy, Unknown, 11/2/17)           PENICILLINS (Verified  Allergy, Unknown, 11/2/17)           SULFA (SULFONAMIDE ANTIBIOTICS) (Verified  Allergy, Unknown, 11/2/17)           SULFAMETHOXAZOLE (Verified  Allergy, Unknown, 11/2/17)           TETRACYCLINE (Verified  Allergy, Unknown, 11/2/17)           TRIMETHOPRIM (Verified  Allergy, Unknown, 11/2/17)      Medications    Last Reconciled on 6/1/18 14:44 by IDALMIS SILVA      (Trulicity)   No Conflict Check      0.75 SQ QFRIDAY         Reported         6/1/18       Citalopram HBr (CeleXA) 10 Mg Tablet      20 MG PO QDAY, #60 TAB 0 Refills         Reported         5/4/18       busPIRone HCl (busPIRone HCl) 10 Mg Tablet      10 MG PO TID, #90 TAB         Reported         5/4/18       Triamcinolone Acetonide 0.1% Oint (Triamcinolone Acetonide 0.1% Oint) 454 Gm     Oint...g.      1 APL TOPICAL QDAY, #1 TUBE         Reported         5/4/18       Empagliflozin (Jardiance) 10 Mg Tablet      10 MG PO QDAY for 30 Days, #30 TAB         Reported         5/4/18       NEB-Albuterol Sulf (Albuterol Sulfate) 5 Mg/1 Ml Solution      2.5 MG INH Q6H Y for SHORTNESS OF BREATH, #3 BOTTLE 0 Refills         Reported         5/4/18            Quality Measures      Current yr A1c less than 7%:  Yes      Neg ua mAlb this yr in chart:  Yes            Impression      Type II diabetes controlled, depression, anxiety, asthma, nonalcoholic fatty     liver disease, urethral diverticuli, arthritis, hypertriglyceridemia            Plan            Plan: The patient will begin taking the Ozempic on Monday in place of the     Trulicity.  She is encouraged to monitor her glucose 2-3  times each day and to     log them for review at her follow-up appointment.  She will continue the     Jardiance as previously prescribed.  She is encouraged to continue strategies     to lose weight and control carbohydrate intake.  She is encouraged to increase     her physical activity.  We will see her for a follow-up appointment in 2 months     for reevaluation.  She is to contact my office if she notices any increase in     glucose levels.  A copy of the continuous glucose sensor report will be sent to     her primary care provider.            Total time spent with patient was 10 minutes of which half of that time was     spent counseling the patient regarding strategies to promote weight loss and     glucose control            Patient Education      Yes            PREVENTION      Hx Influenza Vaccination:  No      Influenza Vaccine Declined:  No      2 or More Falls Past Year?:  No      Fall Past Year with Injury?:  No      Hx Pneumococcal Vaccination:  No      Encouraged to follow-up with:  PCP regarding preventative exams.                 Disclaimer: Converted document may not contain table formatting or lab diagrams. Please see [facility] [facility] for the authenticated document.

## 2021-05-28 NOTE — PROGRESS NOTES
Patient: JOSHUA TANG     Acct: [id number]     Report: #[id number]  UNIT #: [id number]     : 1980    Encounter Date:2018  PRIMARY CARE: VIJAYA HEARN  ***Signed***  --------------------------------------------------------------------------------------------------------------------  Encounter Date      Sep 12, 2018            Reason for Visit      This patient is seen in the office today for follow-up evaluation for diabetes     medication management.  She is a 37-year-old female patient with a history of     type II diabetes.  She is currently managed on Jardiance 10 mg every day and     Ozempic 1 mg every week.  The patient reports that she has had a 9 pound weight     loss since last being seen.  She states that she is only testing her blood     sugars randomly.  She states when she does test her glucose levels are in the     100-120 range.  She denies any episodes of hypoglycemia.  She denies any     intolerance to the 2 medications have been prescribed.            History            History: She denies any health issues or changes since last being seen.            Allergies/Medications      Allergies:        Coded Allergies:             LATEX (Verified  Allergy, Unknown, SWELLS UP , 18)           PENICILLINS (Verified  Allergy, Unknown, AS A CHILD, 18)           SULFA (SULFONAMIDE ANTIBIOTICS) (Verified  Allergy, Unknown, TURNS BRITE     RED AND SHE FEELS LIKE SHE ON FIRE, BLISTERS, 18)           SULFAMETHOXAZOLE (Verified  Allergy, Unknown, 18)           TRIMETHOPRIM (Verified  Allergy, Unknown, 18)      Medications    Last Reconciled on 18 14:40 by IDALMSI SILVA      (Ozempic)   No Conflict Check      1 MG SQ MO         Reported         18       Citalopram HBr (CeleXA) 10 Mg Tablet      20 MG PO QDAY, #60 TAB 0 Refills         Reported         18       busPIRone HCl (busPIRone HCl) 10 Mg Tablet      10 MG PO BID, #90 TAB         Reported          5/4/18       Empagliflozin (Jardiance) 10 Mg Tablet      10 MG PO QDAY for 30 Days, #30 TAB         Reported         5/4/18       NEB-Albuterol Sulf (Albuterol Sulfate) 5 Mg/1 Ml Solution      2.5 MG INH Q6H PRN for SHORTNESS OF BREATH, #3 BOTTLE 0 Refills         Reported         5/4/18            Quality Measures      Current yr A1c less than 7%:  Yes      Neg ua mAlb this yr in chart:  Yes            Impression      Type 2 DM controlled            Plan      At this time we discussed the need for the patient to continue being seen in     this office given that her glucose levels are well controlled.  She is managed     to get her A1c down from 12.6% to 6.1%.  She is scheduled to get repeat lab work     done.  We will evaluate the A1c to see if she has been able to sustain this     over the last few months.  If there are any problems with the A1c the patient     will return to this office for additional assistance otherwise she will follow     up with her primary care provider for future services.  She is welcome to return     should she have any issues or concerns.            Total time spent with patient was 10 minutes of which half of that time was     spent counseling the patient regarding strategies to maintain glucose control            PREVENTION      Hx Influenza Vaccination:  No      Influenza Vaccine Declined:  No      2 or More Falls Past Year?:  No      Fall Past Year with Injury?:  No      Hx Pneumococcal Vaccination:  No      Encouraged to follow-up with:  PCP regarding preventative exams.                 Disclaimer: Converted document may not contain table formatting or lab diagrams. Please see [facility] for the authenticated document.

## 2021-07-02 DIAGNOSIS — M77.9 INFLAMMATION AROUND JOINT: ICD-10-CM

## 2021-07-02 DIAGNOSIS — T78.40XS ALLERGY, SEQUELA: Primary | ICD-10-CM

## 2021-07-02 PROBLEM — M25.561 PAIN IN BOTH KNEES: Status: ACTIVE | Noted: 2018-03-20

## 2021-07-02 PROBLEM — J45.909 ASTHMA: Status: ACTIVE | Noted: 2021-07-02

## 2021-07-02 PROBLEM — J30.2 SEASONAL ALLERGIC RHINITIS: Status: ACTIVE | Noted: 2021-07-02

## 2021-07-02 PROBLEM — M25.562 PAIN IN BOTH KNEES: Status: ACTIVE | Noted: 2018-03-20

## 2021-07-02 PROBLEM — R06.02 SHORTNESS OF BREATH: Status: ACTIVE | Noted: 2021-07-02

## 2021-07-02 PROBLEM — E11.9 TYPE 2 DIABETES MELLITUS: Status: ACTIVE | Noted: 2018-03-06

## 2021-07-02 PROBLEM — M19.90 ARTHRITIS: Status: ACTIVE | Noted: 2021-07-02

## 2021-07-02 PROBLEM — T78.40XA ALLERGY: Status: ACTIVE | Noted: 2021-07-02

## 2021-07-02 PROBLEM — E66.9 OBESITY: Status: ACTIVE | Noted: 2018-03-06

## 2021-07-02 PROBLEM — D64.9 ANEMIA: Status: ACTIVE | Noted: 2021-07-02

## 2021-07-02 RX ORDER — BACLOFEN 10 MG/1
TABLET ORAL
Qty: 40 TABLET | Refills: 2 | Status: SHIPPED | OUTPATIENT
Start: 2021-07-02 | End: 2021-09-28

## 2021-07-02 RX ORDER — FLUTICASONE PROPIONATE 50 MCG
SPRAY, SUSPENSION (ML) NASAL
Qty: 16 G | Refills: 2 | Status: SHIPPED | OUTPATIENT
Start: 2021-07-02 | End: 2021-10-05

## 2021-08-02 DIAGNOSIS — B35.0 FUNGAL INFECTION OF THE SCALP: ICD-10-CM

## 2021-08-02 DIAGNOSIS — M19.90 ARTHRITIS: Primary | ICD-10-CM

## 2021-08-03 RX ORDER — MELOXICAM 7.5 MG/1
TABLET ORAL
Qty: 30 TABLET | Refills: 5 | Status: SHIPPED | OUTPATIENT
Start: 2021-08-03 | End: 2022-01-17

## 2021-08-03 RX ORDER — KETOCONAZOLE 20 MG/ML
SHAMPOO TOPICAL
Qty: 120 ML | Refills: 2 | Status: SHIPPED | OUTPATIENT
Start: 2021-08-03 | End: 2021-10-25

## 2021-08-06 ENCOUNTER — TELEPHONE (OUTPATIENT)
Dept: FAMILY MEDICINE CLINIC | Facility: CLINIC | Age: 41
End: 2021-08-06

## 2021-08-06 DIAGNOSIS — B37.9 YEAST INFECTION: Primary | ICD-10-CM

## 2021-08-06 RX ORDER — FLUCONAZOLE 150 MG/1
150 TABLET ORAL ONCE
Qty: 1 TABLET | Refills: 0 | Status: SHIPPED | OUTPATIENT
Start: 2021-08-06 | End: 2021-08-06

## 2021-08-06 NOTE — TELEPHONE ENCOUNTER
Caller: Mary Go    Relationship: Self    Best call back number: [phone]    What medication are you requesting: ANTIBIOTIC   What are your current symptoms:  WHITE CREAMY DISCHARGE     How long have you been experiencing symptoms: 2 DAYS     Have you had these symptoms before:    [x] Yes  [] No    Have you been treated for these symptoms before:   [x] Yes  [] No    If a prescription is needed, what is your preferred pharmacy and phone number: [facility] [place], [place] - [address] [place] [address] - [phone]  - [phone]

## 2021-08-24 ENCOUNTER — LAB (OUTPATIENT)
Dept: LAB | Facility: HOSPITAL | Age: 41
End: 2021-08-24

## 2021-08-24 ENCOUNTER — OFFICE VISIT (OUTPATIENT)
Dept: FAMILY MEDICINE CLINIC | Facility: CLINIC | Age: 41
End: 2021-08-24

## 2021-08-24 VITALS
HEIGHT: 63 IN | SYSTOLIC BLOOD PRESSURE: 112 MMHG | OXYGEN SATURATION: 96 % | HEART RATE: 82 BPM | DIASTOLIC BLOOD PRESSURE: 76 MMHG | WEIGHT: 248.8 LBS | BODY MASS INDEX: 44.08 KG/M2

## 2021-08-24 DIAGNOSIS — E11.65 TYPE 2 DIABETES MELLITUS WITH HYPERGLYCEMIA, WITHOUT LONG-TERM CURRENT USE OF INSULIN (HCC): ICD-10-CM

## 2021-08-24 DIAGNOSIS — M79.671 HEEL PAIN, BILATERAL: ICD-10-CM

## 2021-08-24 DIAGNOSIS — E66.01 CLASS 3 SEVERE OBESITY DUE TO EXCESS CALORIES WITH SERIOUS COMORBIDITY AND BODY MASS INDEX (BMI) OF 40.0 TO 44.9 IN ADULT (HCC): Primary | Chronic | ICD-10-CM

## 2021-08-24 DIAGNOSIS — E66.01 CLASS 3 SEVERE OBESITY DUE TO EXCESS CALORIES WITH SERIOUS COMORBIDITY AND BODY MASS INDEX (BMI) OF 40.0 TO 44.9 IN ADULT (HCC): Chronic | ICD-10-CM

## 2021-08-24 DIAGNOSIS — M79.672 HEEL PAIN, BILATERAL: ICD-10-CM

## 2021-08-24 DIAGNOSIS — J30.9 ALLERGIC RHINITIS, UNSPECIFIED SEASONALITY, UNSPECIFIED TRIGGER: Chronic | ICD-10-CM

## 2021-08-24 DIAGNOSIS — D64.9 ANEMIA, UNSPECIFIED TYPE: ICD-10-CM

## 2021-08-24 DIAGNOSIS — E55.9 VITAMIN D DEFICIENCY: Chronic | ICD-10-CM

## 2021-08-24 DIAGNOSIS — E55.9 VITAMIN D DEFICIENCY: Primary | ICD-10-CM

## 2021-08-24 PROBLEM — L40.9 PSORIASIS: Status: ACTIVE | Noted: 2019-07-30

## 2021-08-24 PROBLEM — M76.50 PATELLAR TENDINITIS: Status: ACTIVE | Noted: 2018-03-20

## 2021-08-24 PROBLEM — K21.9 ESOPHAGEAL REFLUX: Status: ACTIVE | Noted: 2021-08-24

## 2021-08-24 PROBLEM — E66.813 CLASS 3 SEVERE OBESITY DUE TO EXCESS CALORIES WITH SERIOUS COMORBIDITY AND BODY MASS INDEX (BMI) OF 40.0 TO 44.9 IN ADULT: Status: ACTIVE | Noted: 2018-03-06

## 2021-08-24 PROBLEM — K51.90 COLITIS, ULCERATIVE (HCC): Status: ACTIVE | Noted: 2021-08-24

## 2021-08-24 PROBLEM — J32.9 SINUSITIS, CHRONIC: Status: ACTIVE | Noted: 2021-07-02

## 2021-08-24 LAB
25(OH)D3 SERPL-MCNC: 24.4 NG/ML (ref 30–100)
ALBUMIN SERPL-MCNC: 4.3 G/DL (ref 3.5–5.2)
ALBUMIN UR-MCNC: 15.3 MG/DL
ALBUMIN/GLOB SERPL: 1.7 G/DL
ALP SERPL-CCNC: 68 U/L (ref 39–117)
ALT SERPL W P-5'-P-CCNC: 23 U/L (ref 1–33)
ANION GAP SERPL CALCULATED.3IONS-SCNC: 15.7 MMOL/L (ref 5–15)
AST SERPL-CCNC: 20 U/L (ref 1–32)
BACTERIA UR QL AUTO: ABNORMAL /HPF
BASOPHILS # BLD AUTO: 0.07 10*3/MM3 (ref 0–0.2)
BASOPHILS NFR BLD AUTO: 0.7 % (ref 0–1.5)
BILIRUB SERPL-MCNC: 0.4 MG/DL (ref 0–1.2)
BILIRUB UR QL STRIP: NEGATIVE
BUN SERPL-MCNC: 12 MG/DL (ref 6–20)
BUN/CREAT SERPL: 15.4 (ref 7–25)
CALCIUM SPEC-SCNC: 9.1 MG/DL (ref 8.6–10.5)
CHLORIDE SERPL-SCNC: 107 MMOL/L (ref 98–107)
CHOLEST SERPL-MCNC: 173 MG/DL (ref 0–200)
CLARITY UR: ABNORMAL
CO2 SERPL-SCNC: 19.3 MMOL/L (ref 22–29)
COLOR UR: ABNORMAL
CREAT SERPL-MCNC: 0.78 MG/DL (ref 0.57–1)
DEPRECATED RDW RBC AUTO: 40.6 FL (ref 37–54)
EOSINOPHIL # BLD AUTO: 0.23 10*3/MM3 (ref 0–0.4)
EOSINOPHIL NFR BLD AUTO: 2.4 % (ref 0.3–6.2)
ERYTHROCYTE [DISTWIDTH] IN BLOOD BY AUTOMATED COUNT: 11.9 % (ref 12.3–15.4)
GFR SERPL CREATININE-BSD FRML MDRD: 81 ML/MIN/1.73
GLOBULIN UR ELPH-MCNC: 2.5 GM/DL
GLUCOSE SERPL-MCNC: 113 MG/DL (ref 65–99)
GLUCOSE UR STRIP-MCNC: ABNORMAL MG/DL
HBA1C MFR BLD: 6.44 % (ref 4.8–5.6)
HCT VFR BLD AUTO: 39.1 % (ref 34–46.6)
HDLC SERPL-MCNC: 50 MG/DL (ref 40–60)
HGB BLD-MCNC: 13.4 G/DL (ref 12–15.9)
HGB UR QL STRIP.AUTO: ABNORMAL
HYALINE CASTS UR QL AUTO: ABNORMAL /LPF
IMM GRANULOCYTES # BLD AUTO: 0.07 10*3/MM3 (ref 0–0.05)
IMM GRANULOCYTES NFR BLD AUTO: 0.7 % (ref 0–0.5)
KETONES UR QL STRIP: NEGATIVE
LDLC SERPL CALC-MCNC: 97 MG/DL (ref 0–100)
LDLC/HDLC SERPL: 1.87 {RATIO}
LEUKOCYTE ESTERASE UR QL STRIP.AUTO: NEGATIVE
LYMPHOCYTES # BLD AUTO: 3.34 10*3/MM3 (ref 0.7–3.1)
LYMPHOCYTES NFR BLD AUTO: 34.6 % (ref 19.6–45.3)
MCH RBC QN AUTO: 32.1 PG (ref 26.6–33)
MCHC RBC AUTO-ENTMCNC: 34.3 G/DL (ref 31.5–35.7)
MCV RBC AUTO: 93.5 FL (ref 79–97)
MONOCYTES # BLD AUTO: 0.69 10*3/MM3 (ref 0.1–0.9)
MONOCYTES NFR BLD AUTO: 7.2 % (ref 5–12)
NEUTROPHILS NFR BLD AUTO: 5.24 10*3/MM3 (ref 1.7–7)
NEUTROPHILS NFR BLD AUTO: 54.4 % (ref 42.7–76)
NITRITE UR QL STRIP: NEGATIVE
NRBC BLD AUTO-RTO: 0 /100 WBC (ref 0–0.2)
PH UR STRIP.AUTO: <=5 [PH] (ref 5–8)
PLATELET # BLD AUTO: 302 10*3/MM3 (ref 140–450)
PMV BLD AUTO: 9.7 FL (ref 6–12)
POTASSIUM SERPL-SCNC: 3.8 MMOL/L (ref 3.5–5.2)
PROT SERPL-MCNC: 6.8 G/DL (ref 6–8.5)
PROT UR QL STRIP: ABNORMAL
RBC # BLD AUTO: 4.18 10*6/MM3 (ref 3.77–5.28)
RBC # UR: ABNORMAL /HPF
REF LAB TEST METHOD: ABNORMAL
SODIUM SERPL-SCNC: 142 MMOL/L (ref 136–145)
SP GR UR STRIP: >=1.03 (ref 1–1.03)
SQUAMOUS #/AREA URNS HPF: ABNORMAL /HPF
TRIGL SERPL-MCNC: 148 MG/DL (ref 0–150)
TSH SERPL DL<=0.05 MIU/L-ACNC: 1.77 UIU/ML (ref 0.27–4.2)
UROBILINOGEN UR QL STRIP: ABNORMAL
VLDLC SERPL-MCNC: 26 MG/DL (ref 5–40)
WBC # BLD AUTO: 9.64 10*3/MM3 (ref 3.4–10.8)
WBC UR QL AUTO: ABNORMAL /HPF

## 2021-08-24 PROCEDURE — 36415 COLL VENOUS BLD VENIPUNCTURE: CPT

## 2021-08-24 PROCEDURE — 80050 GENERAL HEALTH PANEL: CPT

## 2021-08-24 PROCEDURE — 83036 HEMOGLOBIN GLYCOSYLATED A1C: CPT

## 2021-08-24 PROCEDURE — 82043 UR ALBUMIN QUANTITATIVE: CPT

## 2021-08-24 PROCEDURE — 82306 VITAMIN D 25 HYDROXY: CPT

## 2021-08-24 PROCEDURE — 81001 URINALYSIS AUTO W/SCOPE: CPT

## 2021-08-24 PROCEDURE — 99214 OFFICE O/P EST MOD 30 MIN: CPT | Performed by: PHYSICIAN ASSISTANT

## 2021-08-24 PROCEDURE — 80061 LIPID PANEL: CPT

## 2021-08-24 RX ORDER — BLOOD-GLUCOSE METER
KIT MISCELLANEOUS
COMMUNITY
Start: 2021-08-02 | End: 2022-02-15

## 2021-08-24 RX ORDER — ERGOCALCIFEROL 1.25 MG/1
50000 CAPSULE ORAL WEEKLY
Qty: 13 CAPSULE | Refills: 3 | Status: SHIPPED | OUTPATIENT
Start: 2021-08-24 | End: 2022-04-01 | Stop reason: SDUPTHER

## 2021-08-24 RX ORDER — ERGOCALCIFEROL 1.25 MG/1
CAPSULE ORAL
COMMUNITY
Start: 2021-03-12 | End: 2021-08-24 | Stop reason: SDUPTHER

## 2021-08-24 RX ORDER — MONTELUKAST SODIUM 10 MG/1
TABLET ORAL
COMMUNITY
Start: 2021-08-02 | End: 2021-12-03

## 2021-08-24 RX ORDER — LANCETS 28 GAUGE
EACH MISCELLANEOUS
COMMUNITY
Start: 2021-07-02 | End: 2022-01-17

## 2021-08-24 RX ORDER — SEMAGLUTIDE 1.34 MG/ML
INJECTION, SOLUTION SUBCUTANEOUS
COMMUNITY
Start: 2021-08-02 | End: 2022-01-17

## 2021-08-24 RX ORDER — NORGESTIMATE AND ETHINYL ESTRADIOL 7DAYSX3 28
KIT ORAL
COMMUNITY
Start: 2021-08-02 | End: 2022-02-16

## 2021-08-24 RX ORDER — BUSPIRONE HYDROCHLORIDE 10 MG/1
10 TABLET ORAL 3 TIMES DAILY
COMMUNITY
Start: 2021-08-02 | End: 2021-08-30

## 2021-08-24 RX ORDER — CITALOPRAM 20 MG/1
20 TABLET ORAL DAILY
COMMUNITY
Start: 2021-08-02 | End: 2021-08-30

## 2021-08-24 RX ORDER — LORATADINE 10 MG/1
TABLET ORAL
COMMUNITY

## 2021-08-24 NOTE — PROGRESS NOTES
Chief Complaint  Diabetes (3 month follow up) and Allergic Rhinitis    Subjective          Mary Go presents to [facility]  History of Present Illness  Pt presents today for 3 month follow up.    Pt states she has been having pain in her left heel. Pt states she has had this before for plantar fasciitis.    Pt states no other issues or concerns.    Pt does monitor her BS off ands on; average 120     Labs 3/11/21  A1C 7.4      Current Outpatient Medications:   •  baclofen (LIORESAL) 10 MG tablet, TAKE ONE TABLET BY MOUTH FOUR TIMES A DAY, Disp: 40 tablet, Rfl: 2  •  busPIRone (BUSPAR) 10 MG tablet, Take 10 mg by mouth 3 (Three) Times a Day., Disp: , Rfl:   •  citalopram (CeleXA) 20 MG tablet, Take 20 mg by mouth Daily., Disp: , Rfl:   •  ergocalciferol (ERGOCALCIFEROL) 1.25 MG (73767 UT) capsule, Vitamin D2 1,250 mcg (50,000 unit) oral capsule take 1 capsule by oral route every 7 days 3/12/2021  Active, Disp: , Rfl:   •  fluticasone (FLONASE) 50 MCG/ACT nasal spray, SPRAY 2 SPRAYS IN EACH NOSTRIL ONCE DAILY IF NEEDED, Disp: 16 g, Rfl: 2  •  FREESTYLE LITE test strip, , Disp: , Rfl:   •  ketoconazole (NIZORAL) 2 % shampoo, APPLY TOPICALLY TO SCALP TWICE WEEKLY AS DIRECTED WAITING AT LEAST 3 DAYS BETWEEN SHAMPOOING, Disp: 120 mL, Rfl: 2  •  Lancets (freestyle) lancets, , Disp: , Rfl:   •  loratadine (Claritin) 10 MG tablet, Claritin 10 mg oral tablet take 1 tablet (10 mg) by oral route once daily   Active, Disp: , Rfl:   •  meloxicam (MOBIC) 7.5 MG tablet, TAKE ONE TABLET BY MOUTH EVERY DAY, Disp: 30 tablet, Rfl: 5  •  montelukast (SINGULAIR) 10 MG tablet, TAKE 1 TABLET (10 MG) BY ORAL ROUTE ONCE DAILY IN THE EVENING, Disp: , Rfl:   •  norgestimate-ethinyl estradiol (ORTHO TRI-CYCLEN,TRINESSA) 0.18/0.215/0.25 MG-35 MCG per tablet, TAKE 1 TABLET BY ORAL ROUTE ONCE DAILY, Disp: , Rfl:   •  Ozempic, 1 MG/DOSE, 4 MG/3ML solution pen-injector, INJECT 1 MG SUB-Q ONCE WEEKLY ON THE SAME  "DAY OF EACH WEEK INTO THE ABDOMEN, THIGHS, OR UPPER ARM ROTATING INJECTION SITES, Disp: , Rfl:   •  ProAir  (90 Base) MCG/ACT inhaler, INHALE 1-2 PUFFS BY MOUTH EVERY 6 HOURS IF NEEDED, Disp: , Rfl:     Objective      Vital Signs:   /76 (BP Location: Left arm)   Pulse 82   Ht 160 cm (63\")   Wt 113 kg (248 lb 12.8 oz)   SpO2 96%   BMI 44.07 kg/m²    Estimated body mass index is 44.07 kg/m² as calculated from the following:    Height as of this encounter: 160 cm (63\").    Weight as of this encounter: 113 kg (248 lb 12.8 oz).     Physical Exam  Vitals and nursing note reviewed.   Constitutional:       Appearance: Normal appearance. She is obese.   HENT:      Head: Normocephalic and atraumatic.   Cardiovascular:      Rate and Rhythm: Normal rate and regular rhythm.      Pulses:           Dorsalis pedis pulses are 2+ on the right side and 2+ on the left side.        Posterior tibial pulses are 2+ on the right side and 2+ on the left side.   Pulmonary:      Effort: Pulmonary effort is normal.      Breath sounds: Normal breath sounds.   Musculoskeletal:      Cervical back: Neck supple.      Comments: Feet - bilat tend along the plantar surface of heel, left greater than right   Feet:      Right foot:      Protective Sensation: 3 sites tested. 3 sites sensed.      Skin integrity: Skin integrity normal. No ulcer or blister.      Toenail Condition: Right toenails are normal.      Left foot:      Protective Sensation: 3 sites tested. 3 sites sensed.      Skin integrity: Skin integrity normal. No ulcer or blister.      Toenail Condition: Left toenails are normal.      Comments:      Neurological:      Mental Status: She is alert.   Psychiatric:         Mood and Affect: Mood normal.         Behavior: Behavior normal.        Result Review :     Common labs    Common Labsle 12/18/20 2/11/21 2/11/21 3/11/21 3/11/21     1547 1547 1205 1205   Glucose 124 (A)  196 (A)  124 (A)   BUN 10  16  17   Creatinine 0.90  0.77 "  0.82   Sodium 137  136  140   Potassium 4.3  3.8  4.2   Chloride 106  102  105   Calcium 9.0  8.9  9.4   Albumin 4.1  4.2  4.4   Total Bilirubin 0.32  0.38  0.45   Alkaline Phosphatase 72  87  69   AST (SGOT) 37  25  31   ALT (SGPT) 33  29  38   WBC  10.51   12.56 (A)   Hemoglobin  13.9   14.8   Hematocrit  39.6   44.8   Platelets  298   379   Total Cholesterol 175    173   Triglycerides 116    114   HDL Cholesterol 50    59   LDL Cholesterol  102 (A)    91   Hemoglobin A1C 6.6 (A)   7.4 (A)    (A) Abnormal value       Comments are available for some flowsheets but are not being displayed.                       Assessment and Plan      Diagnoses and all orders for this visit:    1. Class 3 severe obesity due to excess calories with serious comorbidity and body mass index (BMI) of 40.0 to 44.9 in adult (CMS/[facility]) (Primary)  Comments:  Fair control, disc diet and exercise  Orders:  -     TSH Rfx On Abnormal To Free T4; Future    2. Vitamin D deficiency  Comments:  Stable with weekly vit d supplement  Orders:  -     Vitamin D 25 Hydroxy; Future    3. Allergic rhinitis, unspecified seasonality, unspecified trigger  Comments:  controlled with claritin and flonase  Orders:  -     TSH Rfx On Abnormal To Free T4; Future    4. Heel pain, bilateral  -     XR Calcaneus 2+ View Bilateral; Future    5. Type 2 diabetes mellitus with hyperglycemia, without long-term current use of insulin (CMS/[facility])  -     CBC & Differential; Future  -     Comprehensive Metabolic Panel; Future  -     Lipid Panel; Future  -     TSH Rfx On Abnormal To Free T4; Future  -     Urinalysis With Microscopic If Indicated (No Culture) - Urine, Clean Catch; Future  -     Hemoglobin A1c; Future  -     MicroAlbumin, Urine, Random - Urine, Clean Catch; Future    6. Anemia, unspecified type  -     CBC & Differential; Future       Follow Up     Return in about 3 months (around 11/24/2021).    I have reviewed information obtained and documented by others and I have  confirmed the accuracy of this documented note.     Patient was given instructions and counseling regarding her condition or for health maintenance advice. Please see specific information pulled into the AVS if appropriate.     MIKE Hernandes

## 2021-08-25 ENCOUNTER — TELEPHONE (OUTPATIENT)
Dept: FAMILY MEDICINE CLINIC | Facility: CLINIC | Age: 41
End: 2021-08-25

## 2021-08-25 NOTE — TELEPHONE ENCOUNTER
----- Message from MIKE Hernandes sent at 8/24/2021  6:10 PM EDT -----  Normal Microalb  UA - not a clean catch specimen  CMP - normal  Lipid - normal  HgbA1c - good control DM  TSH - normal  Vit D def - begin Vit D 50,000 IU weekly  CBC - normal

## 2021-08-30 DIAGNOSIS — F32.A DEPRESSION, UNSPECIFIED DEPRESSION TYPE: Primary | ICD-10-CM

## 2021-08-30 RX ORDER — CITALOPRAM 20 MG/1
TABLET ORAL
Qty: 30 TABLET | Refills: 5 | Status: SHIPPED | OUTPATIENT
Start: 2021-08-30 | End: 2022-02-15

## 2021-08-30 RX ORDER — BUSPIRONE HYDROCHLORIDE 10 MG/1
TABLET ORAL
Qty: 60 TABLET | Refills: 5 | Status: SHIPPED | OUTPATIENT
Start: 2021-08-30 | End: 2022-02-15

## 2021-09-14 ENCOUNTER — HOSPITAL ENCOUNTER (OUTPATIENT)
Dept: GENERAL RADIOLOGY | Facility: HOSPITAL | Age: 41
Discharge: HOME | End: 2021-09-14
Admitting: PHYSICIAN ASSISTANT

## 2021-09-14 DIAGNOSIS — M79.672 HEEL PAIN, BILATERAL: ICD-10-CM

## 2021-09-14 DIAGNOSIS — M79.671 HEEL PAIN, BILATERAL: ICD-10-CM

## 2021-09-14 PROCEDURE — 73650 X-RAY EXAM OF HEEL: CPT

## 2021-09-21 PROCEDURE — 87086 URINE CULTURE/COLONY COUNT: CPT | Performed by: EMERGENCY MEDICINE

## 2021-09-27 DIAGNOSIS — M77.9 INFLAMMATION AROUND JOINT: ICD-10-CM

## 2021-09-28 RX ORDER — BACLOFEN 10 MG/1
TABLET ORAL
Qty: 40 TABLET | Refills: 2 | Status: SHIPPED | OUTPATIENT
Start: 2021-09-28 | End: 2021-12-21

## 2021-10-05 DIAGNOSIS — T78.40XS ALLERGY, SEQUELA: ICD-10-CM

## 2021-10-05 RX ORDER — FLUTICASONE PROPIONATE 50 MCG
SPRAY, SUSPENSION (ML) NASAL
Qty: 16 G | Refills: 2 | Status: SHIPPED | OUTPATIENT
Start: 2021-10-05 | End: 2022-04-19

## 2021-10-22 DIAGNOSIS — B35.0 FUNGAL INFECTION OF THE SCALP: ICD-10-CM

## 2021-10-25 RX ORDER — KETOCONAZOLE 20 MG/ML
SHAMPOO TOPICAL
Qty: 120 ML | Refills: 2 | Status: SHIPPED | OUTPATIENT
Start: 2021-10-25 | End: 2022-01-17

## 2021-11-30 ENCOUNTER — OFFICE VISIT (OUTPATIENT)
Dept: FAMILY MEDICINE CLINIC | Facility: CLINIC | Age: 41
End: 2021-11-30

## 2021-11-30 VITALS
HEIGHT: 63 IN | SYSTOLIC BLOOD PRESSURE: 116 MMHG | DIASTOLIC BLOOD PRESSURE: 83 MMHG | BODY MASS INDEX: 43.27 KG/M2 | WEIGHT: 244.2 LBS | HEART RATE: 100 BPM | OXYGEN SATURATION: 96 %

## 2021-11-30 DIAGNOSIS — E11.65 TYPE 2 DIABETES MELLITUS WITH HYPERGLYCEMIA, WITHOUT LONG-TERM CURRENT USE OF INSULIN (HCC): Chronic | ICD-10-CM

## 2021-11-30 DIAGNOSIS — M25.50 POLYARTHRALGIA: Primary | ICD-10-CM

## 2021-11-30 DIAGNOSIS — Z23 FLU VACCINE NEED: ICD-10-CM

## 2021-11-30 DIAGNOSIS — Z83.49 FAMILY HISTORY OF PSEUDOCHOLINESTERASE DEFICIENCY: Chronic | ICD-10-CM

## 2021-11-30 DIAGNOSIS — Z12.4 SCREENING FOR CERVICAL CANCER: ICD-10-CM

## 2021-11-30 DIAGNOSIS — E66.01 CLASS 3 SEVERE OBESITY DUE TO EXCESS CALORIES WITH SERIOUS COMORBIDITY AND BODY MASS INDEX (BMI) OF 40.0 TO 44.9 IN ADULT (HCC): Chronic | ICD-10-CM

## 2021-11-30 DIAGNOSIS — E55.9 VITAMIN D DEFICIENCY: Chronic | ICD-10-CM

## 2021-11-30 PROCEDURE — 90686 IIV4 VACC NO PRSV 0.5 ML IM: CPT | Performed by: PHYSICIAN ASSISTANT

## 2021-11-30 PROCEDURE — 99214 OFFICE O/P EST MOD 30 MIN: CPT | Performed by: PHYSICIAN ASSISTANT

## 2021-11-30 PROCEDURE — 90471 IMMUNIZATION ADMIN: CPT | Performed by: PHYSICIAN ASSISTANT

## 2021-12-03 RX ORDER — MONTELUKAST SODIUM 10 MG/1
TABLET ORAL
Qty: 30 TABLET | Refills: 5 | Status: SHIPPED | OUTPATIENT
Start: 2021-12-03 | End: 2022-06-06

## 2021-12-20 DIAGNOSIS — M77.9 INFLAMMATION AROUND JOINT: ICD-10-CM

## 2021-12-21 ENCOUNTER — LAB (OUTPATIENT)
Dept: LAB | Facility: HOSPITAL | Age: 41
End: 2021-12-21

## 2021-12-21 DIAGNOSIS — M25.50 POLYARTHRALGIA: ICD-10-CM

## 2021-12-21 DIAGNOSIS — Z83.49 FAMILY HISTORY OF PSEUDOCHOLINESTERASE DEFICIENCY: Chronic | ICD-10-CM

## 2021-12-21 DIAGNOSIS — E11.65 TYPE 2 DIABETES MELLITUS WITH HYPERGLYCEMIA, WITHOUT LONG-TERM CURRENT USE OF INSULIN (HCC): Chronic | ICD-10-CM

## 2021-12-21 LAB
ASO AB SERPL-ACNC: NEGATIVE [IU]/ML
CHROMATIN AB SERPL-ACNC: <10 IU/ML (ref 0–14)
CRP SERPL-MCNC: 0.46 MG/DL (ref 0–0.5)
DSDNA IGG SERPL IA-ACNC: NEGATIVE [IU]/ML
HBA1C MFR BLD: 6.9 % (ref 4.8–5.6)
NUCLEAR IGG SER IA-RTO: NEGATIVE
URATE SERPL-MCNC: 4.2 MG/DL (ref 2.4–5.7)

## 2021-12-21 PROCEDURE — 86225 DNA ANTIBODY NATIVE: CPT

## 2021-12-21 PROCEDURE — 36415 COLL VENOUS BLD VENIPUNCTURE: CPT

## 2021-12-21 PROCEDURE — 86038 ANTINUCLEAR ANTIBODIES: CPT

## 2021-12-21 PROCEDURE — 86063 ANTISTREPTOLYSIN O SCREEN: CPT

## 2021-12-21 PROCEDURE — 86431 RHEUMATOID FACTOR QUANT: CPT

## 2021-12-21 PROCEDURE — 82480 ASSAY SERUM CHOLINESTERASE: CPT

## 2021-12-21 PROCEDURE — 84550 ASSAY OF BLOOD/URIC ACID: CPT

## 2021-12-21 PROCEDURE — 86140 C-REACTIVE PROTEIN: CPT

## 2021-12-21 PROCEDURE — 83036 HEMOGLOBIN GLYCOSYLATED A1C: CPT

## 2021-12-21 RX ORDER — BACLOFEN 10 MG/1
TABLET ORAL
Qty: 40 TABLET | Refills: 2 | Status: SHIPPED | OUTPATIENT
Start: 2021-12-21 | End: 2022-03-15

## 2021-12-22 ENCOUNTER — TELEPHONE (OUTPATIENT)
Dept: FAMILY MEDICINE CLINIC | Facility: CLINIC | Age: 41
End: 2021-12-22

## 2021-12-22 NOTE — TELEPHONE ENCOUNTER
----- Message from MIKE Hernandes sent at 12/22/2021 12:06 PM EST -----  Neg RA panel - good control of DM

## 2021-12-23 ENCOUNTER — TELEPHONE (OUTPATIENT)
Dept: FAMILY MEDICINE CLINIC | Facility: CLINIC | Age: 41
End: 2021-12-23

## 2021-12-23 LAB — CHOLINESTERASE SERPL-CCNC: 2450 IU/L (ref 1247–2978)

## 2022-01-17 DIAGNOSIS — M19.90 ARTHRITIS: ICD-10-CM

## 2022-01-17 DIAGNOSIS — B35.0 FUNGAL INFECTION OF THE SCALP: ICD-10-CM

## 2022-01-17 RX ORDER — MELOXICAM 7.5 MG/1
TABLET ORAL
Qty: 30 TABLET | Refills: 5 | OUTPATIENT
Start: 2022-01-17 | End: 2022-06-27

## 2022-01-17 RX ORDER — KETOCONAZOLE 20 MG/ML
SHAMPOO TOPICAL
Qty: 120 ML | Refills: 2 | Status: SHIPPED | OUTPATIENT
Start: 2022-01-17 | End: 2022-04-13

## 2022-01-17 RX ORDER — LANCETS 28 GAUGE
EACH MISCELLANEOUS
Qty: 100 EACH | Refills: 5 | Status: SHIPPED | OUTPATIENT
Start: 2022-01-17 | End: 2023-01-25 | Stop reason: SDUPTHER

## 2022-01-17 RX ORDER — SEMAGLUTIDE 1.34 MG/ML
INJECTION, SOLUTION SUBCUTANEOUS
Qty: 3 ML | Refills: 6 | Status: SHIPPED | OUTPATIENT
Start: 2022-01-17 | End: 2022-08-04

## 2022-01-20 ENCOUNTER — OFFICE VISIT (OUTPATIENT)
Dept: FAMILY MEDICINE CLINIC | Facility: CLINIC | Age: 42
End: 2022-01-20

## 2022-01-20 VITALS
SYSTOLIC BLOOD PRESSURE: 115 MMHG | BODY MASS INDEX: 43.23 KG/M2 | OXYGEN SATURATION: 97 % | WEIGHT: 244 LBS | HEIGHT: 63 IN | HEART RATE: 110 BPM | DIASTOLIC BLOOD PRESSURE: 61 MMHG

## 2022-01-20 DIAGNOSIS — J45.909 ASTHMA, UNSPECIFIED ASTHMA SEVERITY, UNSPECIFIED WHETHER COMPLICATED, UNSPECIFIED WHETHER PERSISTENT: ICD-10-CM

## 2022-01-20 DIAGNOSIS — R52 BODY ACHES: Primary | ICD-10-CM

## 2022-01-20 DIAGNOSIS — R68.83 CHILLS: ICD-10-CM

## 2022-01-20 DIAGNOSIS — R05.9 COUGH: ICD-10-CM

## 2022-01-20 DIAGNOSIS — R50.9 FEVER, UNSPECIFIED FEVER CAUSE: ICD-10-CM

## 2022-01-20 LAB
EXPIRATION DATE: NORMAL
FLUAV AG NPH QL: NEGATIVE
FLUBV AG NPH QL: NEGATIVE
INTERNAL CONTROL: NORMAL
Lab: NORMAL
SARS-COV-2 RNA PNL SPEC NAA+PROBE: DETECTED

## 2022-01-20 PROCEDURE — 87804 INFLUENZA ASSAY W/OPTIC: CPT | Performed by: NURSE PRACTITIONER

## 2022-01-20 PROCEDURE — 99214 OFFICE O/P EST MOD 30 MIN: CPT | Performed by: NURSE PRACTITIONER

## 2022-01-20 PROCEDURE — U0004 COV-19 TEST NON-CDC HGH THRU: HCPCS | Performed by: NURSE PRACTITIONER

## 2022-01-20 RX ORDER — BROMPHENIRAMINE MALEATE, PSEUDOEPHEDRINE HYDROCHLORIDE, AND DEXTROMETHORPHAN HYDROBROMIDE 2; 30; 10 MG/5ML; MG/5ML; MG/5ML
5 SYRUP ORAL 4 TIMES DAILY PRN
Qty: 400 ML | Refills: 0 | Status: SHIPPED | OUTPATIENT
Start: 2022-01-20 | End: 2022-03-28

## 2022-01-20 NOTE — PROGRESS NOTES
Chief Complaint  URI symptoms, body aches and chills  Subjective          Mary Go presents to [facility] for   History of Present Illness     Patient presents today with complaints of  generalized Body Aches (x2 days ), Fever (102), Cough, Chills, Headache, and Nasal Congestion   pt states highest temp was 102.2, pt is taking tylenol currently to keep temp down. Pt having significant body aches and chills. No known loss of taste or smell, very mild SOB. States is having a lot of cough off and on.      Pt states did home COVID test yesterday that was negative. No known direct COVID exposure.     Pt is fully vaccinated, has never had COVID to her knowledge.     Medical History  Past Medical History:   Diagnosis Date   • Allergy, unspecified, initial encounter    • Anemia    • Arthritis    • Asthma    • Colitis, ulcerative ([facility]) 2002   • Diabetes mellitus type 2 in nonobese ([facility]) 03/06/2018   • Limb swelling    • Obesity 03/06/2018   • Pain in both knees 03/20/2018    UNSPECIFIED CHRONICITY   • Psoriasis 07/30/2019   • Reflux esophagitis    • Seasonal allergies    • Sinusitis, chronic    • Skin disorder    • SOB (shortness of breath)    • Tendonitis 03/20/2018    INSERTIONAL PATELLAR TENDONITIS, BILATERAL     Surgical History  Past Surgical History:   Procedure Laterality Date   • CERVICAL POLYPECTOMY  2001   • COLONOSCOPY  07/13/2018    REPEAT IN 3 YEARS (ELIZABETH)   • DIAGNOSTIC LAPAROSCOPY     • ENDOSCOPY  07/31/2018   • POLYPECTOMY  2002    STOMACH POLYP REMOVED   • WISDOM TOOTH EXTRACTION  1997     Social History  Social History     Socioeconomic History   • Marital status: Single   Tobacco Use   • Smoking status: Former Smoker     Packs/day: 0.50     Years: 10.00     Pack years: 5.00   • Smokeless tobacco: Never Used   • Tobacco comment: STARTED AT AGE 16 STOPPED AT AGE 33/ QUIT SMOKING IN 2014   Vaping Use   • Vaping Use: Never used   Substance and Sexual Activity   •  Alcohol use: Never   • Drug use: Never   • Sexual activity: Defer       Current Outpatient Medications:   •  baclofen (LIORESAL) 10 MG tablet, TAKE ONE TABLET BY MOUTH FOUR TIMES A DAY, Disp: 40 tablet, Rfl: 2  •  busPIRone (BUSPAR) 10 MG tablet, TAKE ONE TABLET BY MOUTH THREE TIMES A DAY, Disp: 60 tablet, Rfl: 5  •  citalopram (CeleXA) 20 MG tablet, TAKE ONE TABLET BY MOUTH EVERY DAY, Disp: 30 tablet, Rfl: 5  •  empagliflozin (Jardiance) 10 MG tablet tablet, Take  by mouth Daily., Disp: , Rfl:   •  ergocalciferol (ERGOCALCIFEROL) 1.25 MG (96940 UT) capsule, Take 1 capsule by mouth 1 (One) Time Per Week., Disp: 13 capsule, Rfl: 3  •  fluticasone (FLONASE) 50 MCG/ACT nasal spray, SPRAY 2 SPRAYS IN EACH NOSTRIL ONCE DAILY IF NEEDED, Disp: 16 g, Rfl: 2  •  FREESTYLE LITE test strip, , Disp: , Rfl:   •  ketoconazole (NIZORAL) 2 % shampoo, APPLY TOPICALLY TO SCALP TWICE WEEKLY AS DIRECTED WAITING AT LEAST 3 DAYS BETWEEN SHAMPOOING, Disp: 120 mL, Rfl: 2  •  Lancets (freestyle) lancets, USE TO TEST BLOOD SUGAR TWO TIMES A DAY, Disp: 100 each, Rfl: 5  •  loratadine (Claritin) 10 MG tablet, Claritin 10 mg oral tablet take 1 tablet (10 mg) by oral route once daily   Active, Disp: , Rfl:   •  meloxicam (MOBIC) 7.5 MG tablet, TAKE ONE TABLET BY MOUTH EVERY DAY, Disp: 30 tablet, Rfl: 5  •  montelukast (SINGULAIR) 10 MG tablet, TAKE 1 TABLET (10 MG) BY ORAL ROUTE ONCE DAILY IN THE EVENING, Disp: 30 tablet, Rfl: 5  •  norgestimate-ethinyl estradiol (ORTHO TRI-CYCLEN,TRINESSA) 0.18/0.215/0.25 MG-35 MCG per tablet, TAKE 1 TABLET BY ORAL ROUTE ONCE DAILY, Disp: , Rfl:   •  Omeprazole (PRILOSEC PO), omeprazole, Disp: , Rfl:   •  Ozempic, 1 MG/DOSE, 4 MG/3ML solution pen-injector, INJECT 1 MG SUB-Q ONCE WEEKLY ON THE SAME DAY OF EACH WEEK INTO THE ABDOMEN, THIGHS, OR UPPER ARM ROTATING INJECTION SITES, Disp: 3 mL, Rfl: 6  •  ProAir  (90 Base) MCG/ACT inhaler, INHALE 1-2 PUFFS BY MOUTH EVERY 6 HOURS IF NEEDED, Disp: 8.5 g, Rfl:  "5  •  brompheniramine-pseudoephedrine-DM 30-2-10 MG/5ML syrup, Take 5 mL by mouth 4 (Four) Times a Day As Needed for Congestion, Cough or Allergies., Disp: 400 mL, Rfl: 0    Review of Systems     Objective     /61   Pulse 110   Ht 160 cm (63\")   Wt 111 kg (244 lb)   SpO2 97%   BMI 43.22 kg/m²     Body mass index is 43.22 kg/m².    Physical Exam  Vitals reviewed.   Constitutional:       Appearance: Normal appearance. She is well-developed.   HENT:      Head: Normocephalic and atraumatic.      Right Ear: Tympanic membrane, ear canal and external ear normal.      Left Ear: Tympanic membrane, ear canal and external ear normal.      Nose: Congestion and rhinorrhea present.      Mouth/Throat:      Pharynx: No oropharyngeal exudate.   Eyes:      Conjunctiva/sclera: Conjunctivae normal.      Pupils: Pupils are equal, round, and reactive to light.   Cardiovascular:      Rate and Rhythm: Normal rate and regular rhythm.      Heart sounds: No murmur heard.  No friction rub. No gallop.    Pulmonary:      Effort: Pulmonary effort is normal.      Breath sounds: Wheezing present. No rhonchi.      Comments: Very mild expiratory wheezing in upper airways  Abdominal:      General: Bowel sounds are normal. There is no distension.      Palpations: Abdomen is soft.      Tenderness: There is no abdominal tenderness.   Skin:     General: Skin is warm and dry.   Neurological:      Mental Status: She is alert and oriented to person, place, and time.      Cranial Nerves: No cranial nerve deficit.   Psychiatric:         Mood and Affect: Mood and affect normal.         Behavior: Behavior normal.         Thought Content: Thought content normal.         Judgment: Judgment normal.         Result Review :     The following data was reviewed by: NIKOLE Odom on 01/20/2022:    Influenza A&B    Common Labsle 1/20/22   Rapid Influenza A Ag Negative   Rapid Influenza B Ag Negative                              Assessment:  Diagnoses " and all orders for this visit:    1. Body aches (Primary)  -     COVID-19,APTIMA PANTHER(BHARGAVI),[facility], NP/OP SWAB IN UTM/VTM/S[facility]INE TRANSPORT MEDIA,24 HR TAT - Swab, Nasopharynx; Future  -     POCT Influenza A/B    2. Fever, unspecified fever cause  -     COVID-19,APTIMA PANTHER(BHARGAVI),[facility]IPAK, NP/OP SWAB IN UTM/VTM/[facility] TRANSPORT MEDIA,24 HR TAT - Swab, Nasopharynx; Future  -     POCT Influenza A/B    3. Chills  -     COVID-19,APTIMA PANTHER(BHARGAVI),[facility]Z/[facility] DIPAK, NP/OP SWAB IN UTM/VTM/[facility] TRANSPORT MEDIA,24 HR TAT - Swab, Nasopharynx; Future  -     POCT Influenza A/B    4. Cough  Comments:  Self quarantine pending COVID swab results, any worsening symptoms or significant shortness of breath seek immediate reevaluation  Orders:  -     COVID-19,APTIMA PANTHER(BHARGAVI),[facility]Z/[facility] DIPAK, NP/OP SWAB IN UTM/VTM/[facility] TRANSPORT MEDIA,24 HR TAT - Swab, Nasopharynx; Future  -     POCT Influenza A/B  -     brompheniramine-pseudoephedrine-DM 30-2-10 MG/5ML syrup; Take 5 mL by mouth 4 (Four) Times a Day As Needed for Congestion, Cough or Allergies.  Dispense: 400 mL; Refill: 0    5. Asthma, unspecified asthma severity, unspecified whether complicated, unspecified whether persistent  Assessment & Plan:    Patient does have a history of asthma, typically mild and well controlled with as needed use of albuterol inhaler.  She has some very mild upper airway expiratory wheezing noted today.  Because patient is diabetic we are going to avoid steroid use for now, if she has any worsening symptoms or shortness of breath she will follow-up.  Patient to use albuterol inhaler as needed                Follow Up     Return if symptoms worsen or fail to improve.    Patient was given instructions and counseling regarding her condition or for health maintenance advice. Please see specific information pulled into the AVS if appropriate.     Nicolle Mcmahon, APRN  01/20/2022

## 2022-01-20 NOTE — ASSESSMENT & PLAN NOTE
Patient does have a history of asthma, typically mild and well controlled with as needed use of albuterol inhaler.  She has some very mild upper airway expiratory wheezing noted today.  Because patient is diabetic we are going to avoid steroid use for now, if she has any worsening symptoms or shortness of breath she will follow-up.  Patient to use albuterol inhaler as needed

## 2022-01-24 ENCOUNTER — TELEPHONE (OUTPATIENT)
Dept: FAMILY MEDICINE CLINIC | Facility: CLINIC | Age: 42
End: 2022-01-24

## 2022-01-24 NOTE — TELEPHONE ENCOUNTER
----- Message from NIKOLE Mercer sent at 1/24/2022  7:34 AM EST -----  Covid positive, patient needs to self quarantine for 10 days.  Any worsening symptoms or significant shortness of breath seek immediate reevaluation

## 2022-01-28 ENCOUNTER — TELEPHONE (OUTPATIENT)
Dept: FAMILY MEDICINE CLINIC | Facility: CLINIC | Age: 42
End: 2022-01-28

## 2022-01-28 NOTE — TELEPHONE ENCOUNTER
Caller: Mary Go    Relationship to patient: Self    Best call back number: [phone]    Patient is needing: PATIENT CALLED STATING SHE IS NEEDING A RETURN BACK TO WORK NOTE STATING SHE IS ABLE TO RETURN BACK TO WORK ON 01/31/2022. PATIENT WOULD LIKE A CALL BACK TO LET HER KNOW THIS IS READY TO BE PICKED UP IN THE OFFICE PLEASE ADVISE THANK YOU.

## 2022-02-15 DIAGNOSIS — F32.A DEPRESSION, UNSPECIFIED DEPRESSION TYPE: ICD-10-CM

## 2022-02-15 RX ORDER — BLOOD-GLUCOSE METER
KIT MISCELLANEOUS
Qty: 100 EACH | Refills: 5 | Status: SHIPPED | OUTPATIENT
Start: 2022-02-15 | End: 2023-01-25 | Stop reason: SDUPTHER

## 2022-02-15 RX ORDER — BUSPIRONE HYDROCHLORIDE 10 MG/1
TABLET ORAL
Qty: 60 TABLET | Refills: 5 | Status: SHIPPED | OUTPATIENT
Start: 2022-02-15 | End: 2022-09-12

## 2022-02-15 RX ORDER — CITALOPRAM 20 MG/1
TABLET ORAL
Qty: 30 TABLET | Refills: 5 | Status: SHIPPED | OUTPATIENT
Start: 2022-02-15 | End: 2022-09-12

## 2022-02-16 RX ORDER — NORGESTIMATE AND ETHINYL ESTRADIOL 7DAYSX3 28
KIT ORAL
Qty: 28 TABLET | Refills: 11 | Status: SHIPPED | OUTPATIENT
Start: 2022-02-16 | End: 2023-01-23

## 2022-03-15 DIAGNOSIS — M77.9 INFLAMMATION AROUND JOINT: ICD-10-CM

## 2022-03-15 RX ORDER — BACLOFEN 10 MG/1
TABLET ORAL
Qty: 40 TABLET | Refills: 2 | Status: SHIPPED | OUTPATIENT
Start: 2022-03-15 | End: 2022-06-06

## 2022-03-28 ENCOUNTER — OFFICE VISIT (OUTPATIENT)
Dept: FAMILY MEDICINE CLINIC | Facility: CLINIC | Age: 42
End: 2022-03-28

## 2022-03-28 VITALS
HEIGHT: 63 IN | OXYGEN SATURATION: 97 % | HEART RATE: 104 BPM | SYSTOLIC BLOOD PRESSURE: 117 MMHG | WEIGHT: 239.8 LBS | DIASTOLIC BLOOD PRESSURE: 68 MMHG | BODY MASS INDEX: 42.49 KG/M2

## 2022-03-28 DIAGNOSIS — E66.01 CLASS 3 SEVERE OBESITY DUE TO EXCESS CALORIES WITH SERIOUS COMORBIDITY AND BODY MASS INDEX (BMI) OF 40.0 TO 44.9 IN ADULT: Chronic | ICD-10-CM

## 2022-03-28 DIAGNOSIS — F41.9 ANXIETY: Chronic | ICD-10-CM

## 2022-03-28 DIAGNOSIS — E55.9 VITAMIN D DEFICIENCY: Chronic | ICD-10-CM

## 2022-03-28 DIAGNOSIS — F43.21 GRIEF REACTION: ICD-10-CM

## 2022-03-28 DIAGNOSIS — E11.65 UNCONTROLLED TYPE 2 DIABETES MELLITUS WITH HYPERGLYCEMIA: Primary | ICD-10-CM

## 2022-03-28 DIAGNOSIS — Z23 NEED FOR PNEUMOCOCCAL VACCINATION: ICD-10-CM

## 2022-03-28 PROCEDURE — 99214 OFFICE O/P EST MOD 30 MIN: CPT | Performed by: PHYSICIAN ASSISTANT

## 2022-03-28 NOTE — PROGRESS NOTES
Chief Complaint  Diabetes (4 month follow up), Vitamin D Deficiency, and Depression    Subjective          Mary Go presents to [facility]  History of Present Illness  Mary Go is a 41 y.o. female who presents today for a 4 month follow up DM, Vitamin D Def    Pt notes she has been dealing with depression. She stated her sister passed away the beginning of the month from COVID-19. Pt stated she has good and bad days, she does better when she remembers to take meds.     Labs-2021 A1c:6.9  Mammo-3/2021    Pt has been sporadic in taking her meds.  She is grieving over the death of her sister.  Her sister  from covid she was a transplant vaccinated patient.    Past Medical History:   Diagnosis Date   • Allergy, unspecified, initial encounter    • Anemia    • Arthritis    • Asthma    • Colitis, ulcerative ([facility])    • Diabetes mellitus type 2 in nonobese ([facility]) 2018   • Limb swelling    • Obesity 2018   • Pain in both knees 2018    UNSPECIFIED CHRONICITY   • Psoriasis 2019   • Reflux esophagitis    • Seasonal allergies    • Sinusitis, chronic    • Skin disorder    • SOB (shortness of breath)    • Tendonitis 2018    INSERTIONAL PATELLAR TENDONITIS, BILATERAL      Family History   Problem Relation Age of Onset   • Diabetes Mother         UNSPECIFIED TYPE   • Arthritis Mother    • Osteoporosis Mother    • Stroke Sister    • Diabetes Sister         UNSPECIFIED TYPE   • Arthritis Sister    • Osteoporosis Sister    • Breast cancer Maternal Grandmother         MALIGNANT   • Heart disease Maternal Grandmother    • Cancer Maternal Grandmother         UNSPECIFIED   • Stroke Maternal Grandfather    • Heart disease Maternal Grandfather    • Breast cancer Paternal Grandmother         MALIGNANT   • Cancer Paternal Grandfather         UNSPECIFIED   • COPD Other    • Emphysema Other    • Diabetes Other       Past Surgical History:   Procedure Laterality  Date   • CERVICAL POLYPECTOMY  2001   • COLONOSCOPY  07/13/2018    REPEAT IN 3 YEARS ([facility])   • DIAGNOSTIC LAPAROSCOPY     • ENDOSCOPY  07/31/2018   • POLYPECTOMY  2002    STOMACH POLYP REMOVED   • WISDOM TOOTH EXTRACTION  1997        Current Outpatient Medications:   •  baclofen (LIORESAL) 10 MG tablet, TAKE ONE TABLET BY MOUTH FOUR TIMES A DAY, Disp: 40 tablet, Rfl: 2  •  busPIRone (BUSPAR) 10 MG tablet, TAKE ONE TABLET BY MOUTH THREE TIMES A DAY, Disp: 60 tablet, Rfl: 5  •  citalopram (CeleXA) 20 MG tablet, TAKE ONE TABLET BY MOUTH EVERY DAY, Disp: 30 tablet, Rfl: 5  •  empagliflozin (JARDIANCE) 10 MG tablet tablet, Take  by mouth Daily., Disp: , Rfl:   •  ergocalciferol (ERGOCALCIFEROL) 1.25 MG (62153 UT) capsule, Take 1 capsule by mouth 1 (One) Time Per Week., Disp: 13 capsule, Rfl: 3  •  fluticasone (FLONASE) 50 MCG/ACT nasal spray, SPRAY 2 SPRAYS IN EACH NOSTRIL ONCE DAILY IF NEEDED, Disp: 16 g, Rfl: 2  •  glucose blood (FREESTYLE LITE) test strip, USE AS DIRECTED TO CHECK BLOOD SUGAR TWO TIMES A DAY, Disp: 100 each, Rfl: 5  •  ketoconazole (NIZORAL) 2 % shampoo, APPLY TOPICALLY TO SCALP TWICE WEEKLY AS DIRECTED WAITING AT LEAST 3 DAYS BETWEEN SHAMPOOING, Disp: 120 mL, Rfl: 2  •  Lancets (freestyle) lancets, USE TO TEST BLOOD SUGAR TWO TIMES A DAY, Disp: 100 each, Rfl: 5  •  loratadine (CLARITIN) 10 MG tablet, Claritin 10 mg oral tablet take 1 tablet (10 mg) by oral route once daily   Active, Disp: , Rfl:   •  meloxicam (MOBIC) 7.5 MG tablet, TAKE ONE TABLET BY MOUTH EVERY DAY, Disp: 30 tablet, Rfl: 5  •  montelukast (SINGULAIR) 10 MG tablet, TAKE 1 TABLET (10 MG) BY ORAL ROUTE ONCE DAILY IN THE EVENING, Disp: 30 tablet, Rfl: 5  •  norgestimate-ethinyl estradiol (ORTHO TRI-CYCLEN,TRINESSA) 0.18/0.215/0.25 MG-35 MCG per tablet, TAKE 1 TABLET BY ORAL ROUTE ONCE DAILY, Disp: 28 tablet, Rfl: 11  •  Omeprazole (PRILOSEC PO), omeprazole, Disp: , Rfl:   •  Ozempic, 1 MG/DOSE, 4 MG/3ML solution pen-injector, INJECT  "1 MG SUB-Q ONCE WEEKLY ON THE SAME DAY OF EACH WEEK INTO THE ABDOMEN, THIGHS, OR UPPER ARM ROTATING INJECTION SITES, Disp: 3 mL, Rfl: 6  •  ProAir  (90 Base) MCG/ACT inhaler, INHALE 1-2 PUFFS BY MOUTH EVERY 6 HOURS IF NEEDED, Disp: 8.5 g, Rfl: 5  No current facility-administered medications for this visit.    Objective     Vital Signs:     /68 (BP Location: Left arm)   Pulse 104   Ht 160 cm (63\")   Wt 109 kg (239 lb 12.8 oz)   SpO2 97%   BMI 42.48 kg/m²    Estimated body mass index is 42.48 kg/m² as calculated from the following:    Height as of this encounter: 160 cm (63\").    Weight as of this encounter: 109 kg (239 lb 12.8 oz).     Wt Readings from Last 3 Encounters:   03/28/22 109 kg (239 lb 12.8 oz)   01/20/22 111 kg (244 lb)   11/30/21 111 kg (244 lb 3.2 oz)     BP Readings from Last 3 Encounters:   03/28/22 117/68   01/20/22 115/61   11/30/21 116/83     BMI is above normal parameters. Recommendations: exercise counseling/recommendations and nutrition counseling/recommendations     Physical Exam  Vitals and nursing note reviewed.   Constitutional:       Appearance: Normal appearance. She is obese.   HENT:      Head: Normocephalic and atraumatic.   Cardiovascular:      Rate and Rhythm: Normal rate and regular rhythm.   Pulmonary:      Effort: Pulmonary effort is normal.      Breath sounds: Normal breath sounds.   Musculoskeletal:      Cervical back: Neck supple.   Neurological:      Mental Status: She is alert.   Psychiatric:         Mood and Affect: Mood normal.         Behavior: Behavior normal.      crying during exam    Result Review :     Common labs    Common Labsle 8/24/21 8/24/21 8/24/21 8/24/21 8/24/21 12/21/21 12/21/21    0825 0825 0825 0825 0828 1141 1141   Glucose    113 (A)      BUN    12      Creatinine    0.78      eGFR Non African Am    81      Sodium    142      Potassium    3.8      Chloride    107      Calcium    9.1      Albumin    4.30      Total Bilirubin    0.4    "   Alkaline Phosphatase    68      AST (SGOT)    20      ALT (SGPT)    23      WBC 9.64         Hemoglobin 13.4         Hematocrit 39.1         Platelets 302         Total Cholesterol   173       Triglycerides   148       HDL Cholesterol   50       LDL Cholesterol    97       Hemoglobin A1C  6.44 (A)    6.90 (A)    Microalbumin, Urine     15.3     Uric Acid       4.2   (A) Abnormal value                          Assessment and Plan      Diagnoses and all orders for this visit:    1. Uncontrolled type 2 diabetes mellitus with hyperglycemia (HCC) (Primary)  -     CBC & Differential; Future  -     Comprehensive Metabolic Panel; Future  -     Lipid Panel; Future  -     Urinalysis With Microscopic If Indicated (No Culture) - Urine, Clean Catch; Future  -     Hemoglobin A1c; Future    2. Class 3 severe obesity due to excess calories with serious comorbidity and body mass index (BMI) of 40.0 to 44.9 in adult (HCC)  Comments:  Disc diet and exercise  Orders:  -     TSH Rfx On Abnormal To Free T4; Future    3. Vitamin D deficiency  Comments:  Stable on Vit D 50,000 IU weekly  Orders:  -     Vitamin D 25 Hydroxy; Future    4. Anxiety  Comments:  Not controlled - Increase buspar 10mg TID   Orders:  -     TSH Rfx On Abnormal To Free T4; Future    5. Need for pneumococcal vaccination  -     pneumococcal polysaccharide 23-valent (PNEUMOVAX-23) vaccine 0.5 mL    6. Grief reaction  Comments:  Stable - recent sister's death from Covid       Follow Up     Return in about 4 months (around 7/28/2022).    Patient was given instructions and counseling regarding her condition or for health maintenance advice. Please see specific information pulled into the AVS if appropriate.     I have reviewed information obtained and documented by others and I have confirmed the accuracy of this documented note.    MIKE Hernandes

## 2022-03-31 ENCOUNTER — LAB (OUTPATIENT)
Dept: LAB | Facility: HOSPITAL | Age: 42
End: 2022-03-31

## 2022-03-31 DIAGNOSIS — F41.9 ANXIETY: Chronic | ICD-10-CM

## 2022-03-31 DIAGNOSIS — E66.01 CLASS 3 SEVERE OBESITY DUE TO EXCESS CALORIES WITH SERIOUS COMORBIDITY AND BODY MASS INDEX (BMI) OF 40.0 TO 44.9 IN ADULT: Chronic | ICD-10-CM

## 2022-03-31 DIAGNOSIS — E55.9 VITAMIN D DEFICIENCY: Chronic | ICD-10-CM

## 2022-03-31 DIAGNOSIS — E11.65 UNCONTROLLED TYPE 2 DIABETES MELLITUS WITH HYPERGLYCEMIA: ICD-10-CM

## 2022-03-31 DIAGNOSIS — R82.90 ABNORMAL URINALYSIS: ICD-10-CM

## 2022-03-31 LAB
25(OH)D3 SERPL-MCNC: 20.9 NG/ML (ref 30–100)
ALBUMIN SERPL-MCNC: 4.2 G/DL (ref 3.5–5.2)
ALBUMIN/GLOB SERPL: 1.5 G/DL
ALP SERPL-CCNC: 67 U/L (ref 39–117)
ALT SERPL W P-5'-P-CCNC: 26 U/L (ref 1–33)
ANION GAP SERPL CALCULATED.3IONS-SCNC: 9.7 MMOL/L (ref 5–15)
AST SERPL-CCNC: 21 U/L (ref 1–32)
BACTERIA UR QL AUTO: ABNORMAL /HPF
BASOPHILS # BLD AUTO: 0.08 10*3/MM3 (ref 0–0.2)
BASOPHILS NFR BLD AUTO: 0.8 % (ref 0–1.5)
BILIRUB SERPL-MCNC: 0.3 MG/DL (ref 0–1.2)
BILIRUB UR QL STRIP: NEGATIVE
BUN SERPL-MCNC: 9 MG/DL (ref 6–20)
BUN/CREAT SERPL: 11.8 (ref 7–25)
CALCIUM SPEC-SCNC: 9.2 MG/DL (ref 8.6–10.5)
CHLORIDE SERPL-SCNC: 104 MMOL/L (ref 98–107)
CHOLEST SERPL-MCNC: 156 MG/DL (ref 0–200)
CLARITY UR: ABNORMAL
CO2 SERPL-SCNC: 22.3 MMOL/L (ref 22–29)
COLOR UR: YELLOW
CREAT SERPL-MCNC: 0.76 MG/DL (ref 0.57–1)
DEPRECATED RDW RBC AUTO: 41.5 FL (ref 37–54)
EGFRCR SERPLBLD CKD-EPI 2021: 100.5 ML/MIN/1.73
EOSINOPHIL # BLD AUTO: 0.26 10*3/MM3 (ref 0–0.4)
EOSINOPHIL NFR BLD AUTO: 2.8 % (ref 0.3–6.2)
ERYTHROCYTE [DISTWIDTH] IN BLOOD BY AUTOMATED COUNT: 12.1 % (ref 12.3–15.4)
GLOBULIN UR ELPH-MCNC: 2.8 GM/DL
GLUCOSE SERPL-MCNC: 133 MG/DL (ref 65–99)
GLUCOSE UR STRIP-MCNC: NEGATIVE MG/DL
HBA1C MFR BLD: 6.7 % (ref 4.8–5.6)
HCT VFR BLD AUTO: 41 % (ref 34–46.6)
HDLC SERPL-MCNC: 44 MG/DL (ref 40–60)
HGB BLD-MCNC: 13.9 G/DL (ref 12–15.9)
HGB UR QL STRIP.AUTO: NEGATIVE
HYALINE CASTS UR QL AUTO: ABNORMAL /LPF
IMM GRANULOCYTES # BLD AUTO: 0.03 10*3/MM3 (ref 0–0.05)
IMM GRANULOCYTES NFR BLD AUTO: 0.3 % (ref 0–0.5)
KETONES UR QL STRIP: NEGATIVE
LDLC SERPL CALC-MCNC: 93 MG/DL (ref 0–100)
LDLC/HDLC SERPL: 2.06 {RATIO}
LEUKOCYTE ESTERASE UR QL STRIP.AUTO: ABNORMAL
LYMPHOCYTES # BLD AUTO: 3.93 10*3/MM3 (ref 0.7–3.1)
LYMPHOCYTES NFR BLD AUTO: 41.7 % (ref 19.6–45.3)
MCH RBC QN AUTO: 32 PG (ref 26.6–33)
MCHC RBC AUTO-ENTMCNC: 33.9 G/DL (ref 31.5–35.7)
MCV RBC AUTO: 94.3 FL (ref 79–97)
MONOCYTES # BLD AUTO: 0.73 10*3/MM3 (ref 0.1–0.9)
MONOCYTES NFR BLD AUTO: 7.7 % (ref 5–12)
NEUTROPHILS NFR BLD AUTO: 4.4 10*3/MM3 (ref 1.7–7)
NEUTROPHILS NFR BLD AUTO: 46.7 % (ref 42.7–76)
NITRITE UR QL STRIP: NEGATIVE
NRBC BLD AUTO-RTO: 0 /100 WBC (ref 0–0.2)
PH UR STRIP.AUTO: <=5 [PH] (ref 5–8)
PLATELET # BLD AUTO: 321 10*3/MM3 (ref 140–450)
PMV BLD AUTO: 9.7 FL (ref 6–12)
POTASSIUM SERPL-SCNC: 4.3 MMOL/L (ref 3.5–5.2)
PROT SERPL-MCNC: 7 G/DL (ref 6–8.5)
PROT UR QL STRIP: NEGATIVE
RBC # BLD AUTO: 4.35 10*6/MM3 (ref 3.77–5.28)
RBC # UR STRIP: ABNORMAL /HPF
REF LAB TEST METHOD: ABNORMAL
SODIUM SERPL-SCNC: 136 MMOL/L (ref 136–145)
SP GR UR STRIP: 1.02 (ref 1–1.03)
SQUAMOUS #/AREA URNS HPF: ABNORMAL /HPF
TRIGL SERPL-MCNC: 106 MG/DL (ref 0–150)
TSH SERPL DL<=0.05 MIU/L-ACNC: 1.97 UIU/ML (ref 0.27–4.2)
UROBILINOGEN UR QL STRIP: ABNORMAL
VLDLC SERPL-MCNC: 19 MG/DL (ref 5–40)
WBC # UR STRIP: ABNORMAL /HPF
WBC NRBC COR # BLD: 9.43 10*3/MM3 (ref 3.4–10.8)

## 2022-03-31 PROCEDURE — 36415 COLL VENOUS BLD VENIPUNCTURE: CPT

## 2022-03-31 PROCEDURE — 80050 GENERAL HEALTH PANEL: CPT

## 2022-03-31 PROCEDURE — 81001 URINALYSIS AUTO W/SCOPE: CPT

## 2022-03-31 PROCEDURE — 87086 URINE CULTURE/COLONY COUNT: CPT

## 2022-03-31 PROCEDURE — 80061 LIPID PANEL: CPT

## 2022-03-31 PROCEDURE — 82306 VITAMIN D 25 HYDROXY: CPT

## 2022-03-31 PROCEDURE — 83036 HEMOGLOBIN GLYCOSYLATED A1C: CPT

## 2022-04-01 ENCOUNTER — TELEPHONE (OUTPATIENT)
Dept: FAMILY MEDICINE CLINIC | Facility: CLINIC | Age: 42
End: 2022-04-01

## 2022-04-01 DIAGNOSIS — E55.9 VITAMIN D DEFICIENCY: ICD-10-CM

## 2022-04-01 DIAGNOSIS — R82.90 ABNORMAL URINALYSIS: Primary | ICD-10-CM

## 2022-04-01 RX ORDER — ERGOCALCIFEROL 1.25 MG/1
50000 CAPSULE ORAL WEEKLY
Qty: 13 CAPSULE | Refills: 1 | Status: SHIPPED | OUTPATIENT
Start: 2022-04-01 | End: 2022-11-29 | Stop reason: SDUPTHER

## 2022-04-01 NOTE — TELEPHONE ENCOUNTER
----- Message from MIKE Hernandes sent at 4/1/2022  6:06 AM EDT -----  Urine culture needed    Vitamin D Deficiency noted - begin Vitamin D 50,000 IU weekly #[id number]

## 2022-04-02 LAB — BACTERIA SPEC AEROBE CULT: NORMAL

## 2022-04-04 ENCOUNTER — TELEPHONE (OUTPATIENT)
Dept: FAMILY MEDICINE CLINIC | Facility: CLINIC | Age: 42
End: 2022-04-04

## 2022-04-13 DIAGNOSIS — B35.0 FUNGAL INFECTION OF THE SCALP: ICD-10-CM

## 2022-04-13 RX ORDER — KETOCONAZOLE 20 MG/ML
SHAMPOO TOPICAL
Qty: 120 ML | Refills: 2 | Status: SHIPPED | OUTPATIENT
Start: 2022-04-13 | End: 2022-07-07

## 2022-04-19 DIAGNOSIS — T78.40XS ALLERGY, SEQUELA: ICD-10-CM

## 2022-04-19 RX ORDER — FLUTICASONE PROPIONATE 50 MCG
SPRAY, SUSPENSION (ML) NASAL
Qty: 16 G | Refills: 2 | Status: SHIPPED | OUTPATIENT
Start: 2022-04-19 | End: 2022-07-07

## 2022-04-19 RX ORDER — EMPAGLIFLOZIN 10 MG/1
TABLET, FILM COATED ORAL
Qty: 30 TABLET | Refills: 2 | Status: SHIPPED | OUTPATIENT
Start: 2022-04-19 | End: 2022-07-07

## 2022-04-28 ENCOUNTER — TELEPHONE (OUTPATIENT)
Dept: FAMILY MEDICINE CLINIC | Facility: CLINIC | Age: 42
End: 2022-04-28

## 2022-04-28 NOTE — TELEPHONE ENCOUNTER
Caller: MANDI     Relationship to patient: Other / MANASA  Best call back number: [phone]    Patient is needing:  FROM [employer]NASA CALLED AND SAID SHE HAD TO LET OFFICE KNOW THAT PATIENT NOW HAS A CARE PLAN WITH [employer]NASA. SHE SAID THAT MIKE QUIÑONES COULD ACCESS THAT PLAN THROUGH THE PROVIDER PORTAL UNDER [employer].

## 2022-06-06 DIAGNOSIS — M77.9 INFLAMMATION AROUND JOINT: ICD-10-CM

## 2022-06-06 RX ORDER — MONTELUKAST SODIUM 10 MG/1
TABLET ORAL
Qty: 30 TABLET | Refills: 5 | Status: SHIPPED | OUTPATIENT
Start: 2022-06-06 | End: 2022-11-29

## 2022-06-06 RX ORDER — BACLOFEN 10 MG/1
TABLET ORAL
Qty: 40 TABLET | Refills: 2 | OUTPATIENT
Start: 2022-06-06 | End: 2022-06-27

## 2022-06-09 ENCOUNTER — TRANSCRIBE ORDERS (OUTPATIENT)
Dept: ADMINISTRATIVE | Facility: HOSPITAL | Age: 42
End: 2022-06-09

## 2022-06-09 DIAGNOSIS — Z12.31 SCREENING MAMMOGRAM, ENCOUNTER FOR: Primary | ICD-10-CM

## 2022-06-26 ENCOUNTER — APPOINTMENT (OUTPATIENT)
Dept: GENERAL RADIOLOGY | Facility: HOSPITAL | Age: 42
End: 2022-06-26

## 2022-06-26 ENCOUNTER — HOSPITAL ENCOUNTER (EMERGENCY)
Facility: HOSPITAL | Age: 42
Discharge: HOME | End: 2022-06-27
Attending: STUDENT IN AN ORGANIZED HEALTH CARE EDUCATION/TRAINING PROGRAM | Admitting: EMERGENCY MEDICINE

## 2022-06-26 VITALS
BODY MASS INDEX: 42.42 KG/M2 | RESPIRATION RATE: 20 BRPM | TEMPERATURE: 98.6 F | OXYGEN SATURATION: 97 % | WEIGHT: 239.42 LBS | SYSTOLIC BLOOD PRESSURE: 117 MMHG | HEIGHT: 63 IN | HEART RATE: 95 BPM | DIASTOLIC BLOOD PRESSURE: 92 MMHG

## 2022-06-26 DIAGNOSIS — M54.6 THORACIC SPINE PAIN: ICD-10-CM

## 2022-06-26 DIAGNOSIS — M54.2 CERVICAL PAIN (NECK): Primary | ICD-10-CM

## 2022-06-26 DIAGNOSIS — M54.12 CERVICAL RADICULOPATHY: ICD-10-CM

## 2022-06-26 PROCEDURE — 25010000002 KETOROLAC TROMETHAMINE PER 15 MG: Performed by: EMERGENCY MEDICINE

## 2022-06-26 PROCEDURE — 63710000001 ONDANSETRON ODT 4 MG TABLET DISPERSIBLE: Performed by: NURSE PRACTITIONER

## 2022-06-26 PROCEDURE — 96372 THER/PROPH/DIAG INJ SC/IM: CPT

## 2022-06-26 PROCEDURE — 73030 X-RAY EXAM OF SHOULDER: CPT

## 2022-06-26 PROCEDURE — 72050 X-RAY EXAM NECK SPINE 4/5VWS: CPT

## 2022-06-26 PROCEDURE — 25010000002 ORPHENADRINE CITRATE PER 60 MG: Performed by: EMERGENCY MEDICINE

## 2022-06-26 PROCEDURE — 99283 EMERGENCY DEPT VISIT LOW MDM: CPT

## 2022-06-26 PROCEDURE — 72072 X-RAY EXAM THORAC SPINE 3VWS: CPT

## 2022-06-26 RX ORDER — ORPHENADRINE CITRATE 30 MG/ML
60 INJECTION INTRAMUSCULAR; INTRAVENOUS ONCE
Status: COMPLETED | OUTPATIENT
Start: 2022-06-26 | End: 2022-06-26

## 2022-06-26 RX ORDER — KETOROLAC TROMETHAMINE 30 MG/ML
60 INJECTION, SOLUTION INTRAMUSCULAR; INTRAVENOUS ONCE
Status: COMPLETED | OUTPATIENT
Start: 2022-06-26 | End: 2022-06-26

## 2022-06-26 RX ORDER — ONDANSETRON 4 MG/1
4 TABLET, ORALLY DISINTEGRATING ORAL ONCE
Status: COMPLETED | OUTPATIENT
Start: 2022-06-26 | End: 2022-06-26

## 2022-06-26 RX ORDER — OXYCODONE AND ACETAMINOPHEN 10; 325 MG/1; MG/1
1 TABLET ORAL ONCE
Status: COMPLETED | OUTPATIENT
Start: 2022-06-26 | End: 2022-06-26

## 2022-06-26 RX ADMIN — ORPHENADRINE CITRATE 60 MG: 60 INJECTION INTRAMUSCULAR; INTRAVENOUS at 22:53

## 2022-06-26 RX ADMIN — OXYCODONE HYDROCHLORIDE AND ACETAMINOPHEN 1 TABLET: 10; 325 TABLET ORAL at 22:53

## 2022-06-26 RX ADMIN — KETOROLAC TROMETHAMINE 60 MG: 30 INJECTION, SOLUTION INTRAMUSCULAR; INTRAVENOUS at 22:53

## 2022-06-26 RX ADMIN — ONDANSETRON 4 MG: 4 TABLET, ORALLY DISINTEGRATING ORAL at 22:53

## 2022-06-27 RX ORDER — ORPHENADRINE CITRATE 100 MG/1
100 TABLET, EXTENDED RELEASE ORAL 2 TIMES DAILY
Qty: 20 TABLET | Refills: 0 | Status: SHIPPED | OUTPATIENT
Start: 2022-06-27 | End: 2022-06-30

## 2022-06-27 RX ORDER — KETOROLAC TROMETHAMINE 10 MG/1
10 TABLET, FILM COATED ORAL EVERY 6 HOURS PRN
Qty: 15 TABLET | Refills: 0 | Status: SHIPPED | OUTPATIENT
Start: 2022-06-27 | End: 2022-06-30

## 2022-06-30 ENCOUNTER — OFFICE VISIT (OUTPATIENT)
Dept: FAMILY MEDICINE CLINIC | Facility: CLINIC | Age: 42
End: 2022-06-30

## 2022-06-30 VITALS
HEIGHT: 63 IN | BODY MASS INDEX: 41.64 KG/M2 | SYSTOLIC BLOOD PRESSURE: 111 MMHG | HEART RATE: 96 BPM | DIASTOLIC BLOOD PRESSURE: 84 MMHG | WEIGHT: 235 LBS | OXYGEN SATURATION: 98 %

## 2022-06-30 DIAGNOSIS — M54.2 NECK PAIN WITHOUT INJURY: Primary | ICD-10-CM

## 2022-06-30 DIAGNOSIS — R11.0 NAUSEA: ICD-10-CM

## 2022-06-30 DIAGNOSIS — M54.2 NECK PAIN ON RIGHT SIDE: ICD-10-CM

## 2022-06-30 PROCEDURE — 99214 OFFICE O/P EST MOD 30 MIN: CPT | Performed by: NURSE PRACTITIONER

## 2022-06-30 RX ORDER — CYCLOBENZAPRINE HCL 10 MG
10 TABLET ORAL 2 TIMES DAILY PRN
Qty: 20 TABLET | Refills: 0 | Status: SHIPPED | OUTPATIENT
Start: 2022-06-30 | End: 2022-08-01 | Stop reason: SDUPTHER

## 2022-06-30 RX ORDER — ONDANSETRON 4 MG/1
4 TABLET, FILM COATED ORAL EVERY 8 HOURS PRN
Qty: 30 TABLET | Refills: 0 | Status: SHIPPED | OUTPATIENT
Start: 2022-06-30 | End: 2022-10-19

## 2022-06-30 NOTE — ASSESSMENT & PLAN NOTE
discussed with patient that Toradol is a strong NSAID and these often upset people stomachs.  This can be improved by taking it with food, patient states she had eaten that morning but she did not eat when she took the medication.  I recommend that she trial taking Zofran and then eating prior to trying the Toradol.  We considered switching patient to diclofenac however it has a significant interaction with her Celexa, patient was previously on Mobic on a regular basis and states that it was not helping her neck pain.

## 2022-06-30 NOTE — PROGRESS NOTES
Chief Complaint  Neck pain  Subjective          Mary Go presents to [facility] for   History of Present Illness  Patient presents today following up from ER with complaints of neck Pain (Patient seen at [facility] ER 6/26 for neck, back, and right arm pain. Patient was given Toradol but said this made her very sick. She was also given norflex. She said the only thing that seems to help is a heating pad. )  States pain is ocassionalyl burning, but is mostly just very painful, on right side neck, radiates down arm to elbow and occasionally to wrist.  Denies any numbness or tingling.  Denies any known injury, states she just woke up 1 morning and it was hurting, states that she thought it would get better but continued to get worse.  States the Norflex has not really helped, Toradol made her very sick and nauseated.  Medical History  Past Medical History:   Diagnosis Date   • Allergy, unspecified, initial encounter    • Anemia    • Arthritis    • Asthma    • Colitis, ulcerative ([facility]) 2002   • Diabetes mellitus type 2 in nonobese ([facility]) 03/06/2018   • Limb swelling    • Obesity 03/06/2018   • Pain in both knees 03/20/2018    UNSPECIFIED CHRONICITY   • Psoriasis 07/30/2019   • Reflux esophagitis    • Seasonal allergies    • Sinusitis, chronic    • Skin disorder    • SOB (shortness of breath)    • Tendonitis 03/20/2018    INSERTIONAL PATELLAR TENDONITIS, BILATERAL     Surgical History  Past Surgical History:   Procedure Laterality Date   • CERVICAL POLYPECTOMY  2001   • COLONOSCOPY  07/13/2018    REPEAT IN 3 YEARS ([facility])   • DIAGNOSTIC LAPAROSCOPY     • ENDOSCOPY  07/31/2018   • POLYPECTOMY  2002    STOMACH POLYP REMOVED   • WISDOM TOOTH EXTRACTION  1997     Social History  Social History     Socioeconomic History   • Marital status: Single   Tobacco Use   • Smoking status: Former Smoker     Packs/day: 0.50     Years: 10.00     Pack years: 5.00   • Smokeless tobacco: Never Used   •  Tobacco comment: STARTED AT AGE 16 STOPPED AT AGE 33/ QUIT SMOKING IN 2014   Vaping Use   • Vaping Use: Never used   Substance and Sexual Activity   • Alcohol use: Never   • Drug use: Never   • Sexual activity: Defer       Current Outpatient Medications:   •  busPIRone (BUSPAR) 10 MG tablet, TAKE ONE TABLET BY MOUTH THREE TIMES A DAY, Disp: 60 tablet, Rfl: 5  •  citalopram (CeleXA) 20 MG tablet, TAKE ONE TABLET BY MOUTH EVERY DAY, Disp: 30 tablet, Rfl: 5  •  ergocalciferol (ERGOCALCIFEROL) 1.25 MG (78401 UT) capsule, Take 1 capsule by mouth 1 (One) Time Per Week., Disp: 13 capsule, Rfl: 1  •  fluticasone (FLONASE) 50 MCG/ACT nasal spray, SPRAY 2 SPRAYS IN EACH NOSTRIL ONCE DAILY IF NEEDED, Disp: 16 g, Rfl: 2  •  glucose blood (FREESTYLE LITE) test strip, USE AS DIRECTED TO CHECK BLOOD SUGAR TWO TIMES A DAY, Disp: 100 each, Rfl: 5  •  Jardiance 10 MG tablet tablet, TAKE 1 TABLET (10 MG) BY ORAL ROUTE ONCE DAILY IN THE MORNING FOR 30 DAYS, Disp: 30 tablet, Rfl: 2  •  ketoconazole (NIZORAL) 2 % shampoo, APPLY TOPICALLY TO SCALP TWICE WEEKLY AS DIRECTED WAITING AT LEAST 3 DAYS BETWEEN SHAMPOOING, Disp: 120 mL, Rfl: 2  •  Lancets (freestyle) lancets, USE TO TEST BLOOD SUGAR TWO TIMES A DAY, Disp: 100 each, Rfl: 5  •  loratadine (CLARITIN) 10 MG tablet, Claritin 10 mg oral tablet take 1 tablet (10 mg) by oral route once daily   Active, Disp: , Rfl:   •  montelukast (SINGULAIR) 10 MG tablet, TAKE 1 TABLET (10 MG) BY ORAL ROUTE ONCE DAILY IN THE EVENING, Disp: 30 tablet, Rfl: 5  •  norgestimate-ethinyl estradiol (ORTHO TRI-CYCLEN,TRINESSA) 0.18/0.215/0.25 MG-35 MCG per tablet, TAKE 1 TABLET BY ORAL ROUTE ONCE DAILY, Disp: 28 tablet, Rfl: 11  •  Omeprazole (PRILOSEC PO), Take 1 tablet by mouth Daily As Needed., Disp: , Rfl:   •  Ozempic, 1 MG/DOSE, 4 MG/3ML solution pen-injector, INJECT 1 MG SUB-Q ONCE WEEKLY ON THE SAME DAY OF EACH WEEK INTO THE ABDOMEN, THIGHS, OR UPPER ARM ROTATING INJECTION SITES, Disp: 3 mL, Rfl: 6  •   "ProAir  (90 Base) MCG/ACT inhaler, INHALE 1-2 PUFFS BY MOUTH EVERY 6 HOURS IF NEEDED, Disp: 8.5 g, Rfl: 5  •  cyclobenzaprine (FLEXERIL) 10 MG tablet, Take 1 tablet by mouth 2 (Two) Times a Day As Needed for Muscle Spasms., Disp: 20 tablet, Rfl: 0  •  ondansetron (Zofran) 4 MG tablet, Take 1 tablet by mouth Every 8 (Eight) Hours As Needed for Nausea or Vomiting., Disp: 30 tablet, Rfl: 0    Review of Systems     Objective     /84   Pulse 96   Ht 160 cm (63\")   Wt 107 kg (235 lb)   SpO2 98%   BMI 41.63 kg/m²     Body mass index is 41.63 kg/m².    Physical Exam  Vitals reviewed.   Constitutional:       Appearance: Normal appearance. She is well-developed.   HENT:      Head: Normocephalic and atraumatic.      Right Ear: External ear normal.      Left Ear: External ear normal.   Eyes:      Conjunctiva/sclera: Conjunctivae normal.      Pupils: Pupils are equal, round, and reactive to light.   Cardiovascular:      Rate and Rhythm: Normal rate and regular rhythm.      Heart sounds: No murmur heard.    No friction rub. No gallop.   Pulmonary:      Effort: Pulmonary effort is normal.      Breath sounds: Normal breath sounds. No wheezing or rhonchi.   Musculoskeletal:      Cervical back: Tenderness present. No swelling or bony tenderness. Pain with movement present. Normal range of motion.      Comments: Right side neck and shoulder tender to palpation, tenderness along trap,   Skin:     General: Skin is warm and dry.   Neurological:      Mental Status: She is alert and oriented to person, place, and time.      Cranial Nerves: No cranial nerve deficit.   Psychiatric:         Mood and Affect: Mood and affect normal.         Behavior: Behavior normal.         Thought Content: Thought content normal.         Judgment: Judgment normal.         Result Review :     The following data was reviewed by: NIKOLE Odom on 06/30/2022:        Data reviewed: Recent hospitalization notes ER notes and x-ray        "           Assessment:  Diagnoses and all orders for this visit:    1. Neck pain without injury (Primary)  -     MRI Cervical Spine Without Contrast; Future  -     cyclobenzaprine (FLEXERIL) 10 MG tablet; Take 1 tablet by mouth 2 (Two) Times a Day As Needed for Muscle Spasms.  Dispense: 20 tablet; Refill: 0    2. Neck pain on right side  Comments:  Trial switching muscle relaxer to Flexeril.  Side effects and admin discussed.  Orders:  -     MRI Cervical Spine Without Contrast; Future    3. Nausea  Assessment & Plan:  discussed with patient that Toradol is a strong NSAID and these often upset people stomachs.  This can be improved by taking it with food, patient states she had eaten that morning but she did not eat when she took the medication.  I recommend that she trial taking Zofran and then eating prior to trying the Toradol.  We considered switching patient to diclofenac however it has a significant interaction with her Celexa, patient was previously on Mobic on a regular basis and states that it was not helping her neck pain.     Orders:  -     ondansetron (Zofran) 4 MG tablet; Take 1 tablet by mouth Every 8 (Eight) Hours As Needed for Nausea or Vomiting.  Dispense: 30 tablet; Refill: 0              Follow Up     Return if symptoms worsen or fail to improve.    Patient was given instructions and counseling regarding her condition or for health maintenance advice. Please see specific information pulled into the AVS if appropriate.     Nicolle Mcmahon, APRN  06/30/2022

## 2022-07-06 DIAGNOSIS — B35.0 FUNGAL INFECTION OF THE SCALP: ICD-10-CM

## 2022-07-06 DIAGNOSIS — M19.90 ARTHRITIS: ICD-10-CM

## 2022-07-06 DIAGNOSIS — T78.40XS ALLERGY, SEQUELA: ICD-10-CM

## 2022-07-07 RX ORDER — MELOXICAM 7.5 MG/1
TABLET ORAL
Qty: 30 TABLET | Refills: 5 | OUTPATIENT
Start: 2022-07-07 | End: 2022-10-02

## 2022-07-07 RX ORDER — EMPAGLIFLOZIN 10 MG/1
TABLET, FILM COATED ORAL
Qty: 30 TABLET | Refills: 2 | Status: SHIPPED | OUTPATIENT
Start: 2022-07-07 | End: 2022-11-29 | Stop reason: SDUPTHER

## 2022-07-07 RX ORDER — FLUTICASONE PROPIONATE 50 MCG
SPRAY, SUSPENSION (ML) NASAL
Qty: 16 G | Refills: 2 | Status: SHIPPED | OUTPATIENT
Start: 2022-07-07 | End: 2022-10-05

## 2022-07-07 RX ORDER — KETOCONAZOLE 20 MG/ML
SHAMPOO TOPICAL
Qty: 120 ML | Refills: 2 | Status: SHIPPED | OUTPATIENT
Start: 2022-07-07 | End: 2023-01-25 | Stop reason: SDUPTHER

## 2022-07-26 ENCOUNTER — HOSPITAL ENCOUNTER (OUTPATIENT)
Dept: MRI IMAGING | Facility: HOSPITAL | Age: 42
Discharge: HOME | End: 2022-07-26
Admitting: NURSE PRACTITIONER

## 2022-07-26 DIAGNOSIS — M54.2 NECK PAIN WITHOUT INJURY: ICD-10-CM

## 2022-07-26 DIAGNOSIS — M54.2 NECK PAIN ON RIGHT SIDE: ICD-10-CM

## 2022-07-26 PROCEDURE — 72141 MRI NECK SPINE W/O DYE: CPT

## 2022-07-27 ENCOUNTER — TELEPHONE (OUTPATIENT)
Dept: FAMILY MEDICINE CLINIC | Facility: CLINIC | Age: 42
End: 2022-07-27

## 2022-07-27 DIAGNOSIS — M54.2 NECK PAIN WITHOUT INJURY: ICD-10-CM

## 2022-07-27 DIAGNOSIS — M54.2 NECK PAIN ON RIGHT SIDE: Primary | ICD-10-CM

## 2022-07-27 NOTE — TELEPHONE ENCOUNTER
----- Message from NIKOLE Mercer sent at 7/26/2022  9:41 AM EDT -----  Recommend PT to eval and treat and refer to neurosurgery

## 2022-08-01 ENCOUNTER — OFFICE VISIT (OUTPATIENT)
Dept: FAMILY MEDICINE CLINIC | Facility: CLINIC | Age: 42
End: 2022-08-01

## 2022-08-01 VITALS
HEIGHT: 63 IN | OXYGEN SATURATION: 97 % | SYSTOLIC BLOOD PRESSURE: 118 MMHG | DIASTOLIC BLOOD PRESSURE: 77 MMHG | BODY MASS INDEX: 42.03 KG/M2 | WEIGHT: 237.2 LBS | HEART RATE: 80 BPM

## 2022-08-01 DIAGNOSIS — E11.65 UNCONTROLLED TYPE 2 DIABETES MELLITUS WITH HYPERGLYCEMIA: Primary | ICD-10-CM

## 2022-08-01 DIAGNOSIS — E66.01 CLASS 3 SEVERE OBESITY DUE TO EXCESS CALORIES WITH SERIOUS COMORBIDITY AND BODY MASS INDEX (BMI) OF 40.0 TO 44.9 IN ADULT: Chronic | ICD-10-CM

## 2022-08-01 DIAGNOSIS — Z12.31 ENCOUNTER FOR SCREENING MAMMOGRAM FOR MALIGNANT NEOPLASM OF BREAST: ICD-10-CM

## 2022-08-01 DIAGNOSIS — M54.2 NECK PAIN WITHOUT INJURY: ICD-10-CM

## 2022-08-01 DIAGNOSIS — R93.7 ABNORMAL MRI, CERVICAL SPINE: ICD-10-CM

## 2022-08-01 PROCEDURE — 99213 OFFICE O/P EST LOW 20 MIN: CPT | Performed by: PHYSICIAN ASSISTANT

## 2022-08-01 RX ORDER — FLASH GLUCOSE SENSOR
1 KIT MISCELLANEOUS
Qty: 2 EACH | Refills: 11 | Status: SHIPPED | OUTPATIENT
Start: 2022-08-01

## 2022-08-01 RX ORDER — CYCLOBENZAPRINE HCL 10 MG
10 TABLET ORAL 3 TIMES DAILY PRN
Qty: 60 TABLET | Refills: 1 | Status: SHIPPED | OUTPATIENT
Start: 2022-08-01 | End: 2022-10-05

## 2022-08-01 NOTE — PROGRESS NOTES
Chief Complaint  Allergies, Diabetes (Vit d), and Vitamin D Deficiency     Pt comes in to the office today for a 2 month follow up.    Pt reports that she will need an order for her mammogram to be sent over to [facility]    Pt states she would like to talk about an implant for her sugar levels.    Pt would also like to go over her results of her MRI of the neck.     Pt states that she needs a refill on her Flexeril 10mg.    MRI Abnormal - C-spine - PT and neurosurgeon consults made    Subjective          Mary Go presents to [facility]  History of Present Illness    Past Medical History:   Diagnosis Date   • Allergy, unspecified, initial encounter    • Anemia    • Arthritis    • Asthma    • Colitis, ulcerative (formerly [facility]) 2002   • Diabetes mellitus type 2 in nonobese (formerly [facility]) 03/06/2018   • Limb swelling    • Obesity 03/06/2018   • Pain in both knees 03/20/2018    UNSPECIFIED CHRONICITY   • Psoriasis 07/30/2019   • Reflux esophagitis    • Seasonal allergies    • Sinusitis, chronic    • Skin disorder    • SOB (shortness of breath)    • Tendonitis 03/20/2018    INSERTIONAL PATELLAR TENDONITIS, BILATERAL      Family History   Problem Relation Age of Onset   • Diabetes Mother         UNSPECIFIED TYPE   • Arthritis Mother    • Osteoporosis Mother    • Stroke Sister    • Diabetes Sister         UNSPECIFIED TYPE   • Arthritis Sister    • Osteoporosis Sister    • Breast cancer Maternal Grandmother         MALIGNANT   • Heart disease Maternal Grandmother    • Cancer Maternal Grandmother         UNSPECIFIED   • Stroke Maternal Grandfather    • Heart disease Maternal Grandfather    • Breast cancer Paternal Grandmother         MALIGNANT   • Cancer Paternal Grandfather         UNSPECIFIED   • COPD Other    • Emphysema Other    • Diabetes Other       Past Surgical History:   Procedure Laterality Date   • CERVICAL POLYPECTOMY  2001   • COLONOSCOPY  07/13/2018    REPEAT IN 3 YEARS (ELIZABETH)   • DIAGNOSTIC  LAPAROSCOPY     • ENDOSCOPY  07/31/2018   • POLYPECTOMY  2002    STOMACH POLYP REMOVED   • WISDOM TOOTH EXTRACTION  1997        Current Outpatient Medications:   •  busPIRone (BUSPAR) 10 MG tablet, TAKE ONE TABLET BY MOUTH THREE TIMES A DAY, Disp: 60 tablet, Rfl: 5  •  citalopram (CeleXA) 20 MG tablet, TAKE ONE TABLET BY MOUTH EVERY DAY, Disp: 30 tablet, Rfl: 5  •  cyclobenzaprine (FLEXERIL) 10 MG tablet, Take 1 tablet by mouth 3 (Three) Times a Day As Needed for Muscle Spasms., Disp: 60 tablet, Rfl: 1  •  ergocalciferol (ERGOCALCIFEROL) 1.25 MG (17835 UT) capsule, Take 1 capsule by mouth 1 (One) Time Per Week., Disp: 13 capsule, Rfl: 1  •  fluticasone (FLONASE) 50 MCG/ACT nasal spray, SPRAY 2 SPRAYS IN EACH NOSTRIL ONCE DAILY IF NEEDED, Disp: 16 g, Rfl: 2  •  glucose blood (FREESTYLE LITE) test strip, USE AS DIRECTED TO CHECK BLOOD SUGAR TWO TIMES A DAY, Disp: 100 each, Rfl: 5  •  Jardiance 10 MG tablet tablet, TAKE 1 TABLET (10 MG) BY ORAL ROUTE ONCE DAILY IN THE MORNING, Disp: 30 tablet, Rfl: 2  •  ketoconazole (NIZORAL) 2 % shampoo, APPLY TOPICALLY TO SCALP TWICE WEEKLY AS DIRECTED WAITING AT LEAST 3 DAYS BETWEEN SHAMPOOING, Disp: 120 mL, Rfl: 2  •  Lancets (freestyle) lancets, USE TO TEST BLOOD SUGAR TWO TIMES A DAY, Disp: 100 each, Rfl: 5  •  loratadine (CLARITIN) 10 MG tablet, Claritin 10 mg oral tablet take 1 tablet (10 mg) by oral route once daily   Active, Disp: , Rfl:   •  meloxicam (MOBIC) 7.5 MG tablet, TAKE ONE TABLET BY MOUTH EVERY DAY, Disp: 30 tablet, Rfl: 5  •  montelukast (SINGULAIR) 10 MG tablet, TAKE 1 TABLET (10 MG) BY ORAL ROUTE ONCE DAILY IN THE EVENING, Disp: 30 tablet, Rfl: 5  •  norgestimate-ethinyl estradiol (ORTHO TRI-CYCLEN,TRINESSA) 0.18/0.215/0.25 MG-35 MCG per tablet, TAKE 1 TABLET BY ORAL ROUTE ONCE DAILY, Disp: 28 tablet, Rfl: 11  •  Ozempic, 1 MG/DOSE, 4 MG/3ML solution pen-injector, INJECT 1 MG SUB-Q ONCE WEEKLY ON THE SAME DAY OF EACH WEEK INTO THE ABDOMEN, THIGHS, OR UPPER ARM  "ROTATING INJECTION SITES, Disp: 3 mL, Rfl: 6  •  ProAir  (90 Base) MCG/ACT inhaler, INHALE 1-2 PUFFS BY MOUTH EVERY 6 HOURS IF NEEDED, Disp: 8.5 g, Rfl: 5  •  Continuous Blood Gluc Sensor (FreeStyle Terrie 14 Day Sensor) misc, 1 each Every 14 (Fourteen) Days., Disp: 2 each, Rfl: 11  •  Omeprazole (PRILOSEC PO), Take 1 tablet by mouth Daily As Needed., Disp: , Rfl:   •  ondansetron (Zofran) 4 MG tablet, Take 1 tablet by mouth Every 8 (Eight) Hours As Needed for Nausea or Vomiting., Disp: 30 tablet, Rfl: 0    Objective     Vital Signs:     /77 (BP Location: Right arm, Patient Position: Sitting, Cuff Size: Adult)   Pulse 80   Ht 160 cm (62.99\")   Wt 108 kg (237 lb 3.2 oz)   SpO2 97%   BMI 42.03 kg/m²    Estimated body mass index is 42.03 kg/m² as calculated from the following:    Height as of this encounter: 160 cm (62.99\").    Weight as of this encounter: 108 kg (237 lb 3.2 oz).     Wt Readings from Last 3 Encounters:   08/01/22 108 kg (237 lb 3.2 oz)   06/30/22 107 kg (235 lb)   06/26/22 109 kg (239 lb 6.7 oz)     BP Readings from Last 3 Encounters:   08/01/22 118/77   06/30/22 111/84   06/26/22 117/92     Physical Exam  Vitals and nursing note reviewed.   Constitutional:       Appearance: Normal appearance. She is obese.   HENT:      Head: Normocephalic and atraumatic.   Cardiovascular:      Rate and Rhythm: Normal rate and regular rhythm.      Pulses:           Dorsalis pedis pulses are 2+ on the right side and 2+ on the left side.        Posterior tibial pulses are 2+ on the right side and 2+ on the left side.      Heart sounds: Normal heart sounds.   Pulmonary:      Effort: Pulmonary effort is normal.      Breath sounds: Normal breath sounds.   Musculoskeletal:      Cervical back: Neck supple. Tenderness present.   Feet:      Right foot:      Protective Sensation: 3 sites tested. 3 sites sensed.      Skin integrity: Skin integrity normal. No ulcer or blister.      Toenail Condition: Right " toenails are normal.      Left foot:      Protective Sensation: 3 sites tested. 3 sites sensed.      Skin integrity: Skin integrity normal. No ulcer or blister.      Toenail Condition: Left toenails are normal.      Comments:      Neurological:      Mental Status: She is alert.   Psychiatric:         Mood and Affect: Mood normal.         Behavior: Behavior normal.        Result Review :     Common labs    Common Labsle 8/24/21 8/24/21 8/24/21 8/24/21 8/24/21 12/21/21 12/21/21 3/31/22 3/31/22 3/31/22 3/31/22    0825 0825 0825 0825 0828 1141 1141 0919 0919 0919 0919   Glucose    113 (A)      133 (A)    BUN    12      9    Creatinine    0.78      0.76    eGFR Non African Am    81          Sodium    142      136    Potassium    3.8      4.3    Chloride    107      104    Calcium    9.1      9.2    Albumin    4.30      4.20    Total Bilirubin    0.4      0.3    Alkaline Phosphatase    68      67    AST (SGOT)    20      21    ALT (SGPT)    23      26    WBC 9.64       9.43      Hemoglobin 13.4       13.9      Hematocrit 39.1       41.0      Platelets 302       321      Total Cholesterol   173        156   Triglycerides   148        106   HDL Cholesterol   50        44   LDL Cholesterol    97        93   Hemoglobin A1C  6.44 (A)    6.90 (A)   6.70 (A)     Microalbumin, Urine     15.3         Uric Acid       4.2       (A) Abnormal value                     Patient Care Team:  Corby Schaeffer PA as PCP - General (Physician Assistant)         Assessment and Plan      Diagnoses and all orders for this visit:    1. Uncontrolled type 2 diabetes mellitus with hyperglycemia (HCC) (Primary)  -     Continuous Blood Gluc Sensor (FreeStyle Terrie 14 Day Sensor) misc; 1 each Every 14 (Fourteen) Days.  Dispense: 2 each; Refill: 11    2. Encounter for screening mammogram for malignant neoplasm of breast  -     Mammo Screening Digital Tomosynthesis Bilateral With CAD; Future    3. Class 3 severe obesity due to excess calories with serious  comorbidity and body mass index (BMI) of 40.0 to 44.9 in adult (HCC)  Comments:  Disc diet and exercise    4. Abnormal MRI, cervical spine  Comments:  Consult PT and neurosurgeon -orders already placed    5. Neck pain without injury  -     cyclobenzaprine (FLEXERIL) 10 MG tablet; Take 1 tablet by mouth 3 (Three) Times a Day As Needed for Muscle Spasms.  Dispense: 60 tablet; Refill: 1       Follow Up     Return in about 4 months (around 12/1/2022).    Patient was given instructions and counseling regarding her condition or for health maintenance advice. Please see specific information pulled into the AVS if appropriate.     I have reviewed information obtained and documented by others and I have confirmed the accuracy of this documented note.    MIKE Hernandes

## 2022-08-04 RX ORDER — SEMAGLUTIDE 1.34 MG/ML
INJECTION, SOLUTION SUBCUTANEOUS
Qty: 3 ML | Refills: 6 | Status: SHIPPED | OUTPATIENT
Start: 2022-08-04 | End: 2023-01-25

## 2022-08-08 ENCOUNTER — HOSPITAL ENCOUNTER (OUTPATIENT)
Dept: MAMMOGRAPHY | Facility: HOSPITAL | Age: 42
Discharge: HOME | End: 2022-08-08
Admitting: PHYSICIAN ASSISTANT

## 2022-08-08 DIAGNOSIS — Z12.31 SCREENING MAMMOGRAM, ENCOUNTER FOR: ICD-10-CM

## 2022-08-08 PROCEDURE — 77067 SCR MAMMO BI INCL CAD: CPT

## 2022-08-08 PROCEDURE — 77063 BREAST TOMOSYNTHESIS BI: CPT

## 2022-08-09 ENCOUNTER — OFFICE VISIT (OUTPATIENT)
Dept: NEUROSURGERY | Facility: CLINIC | Age: 42
End: 2022-08-09

## 2022-08-09 VITALS — HEIGHT: 63 IN | BODY MASS INDEX: 42.52 KG/M2 | WEIGHT: 240 LBS

## 2022-08-09 DIAGNOSIS — M47.812 CERVICAL SPONDYLOSIS WITHOUT MYELOPATHY: Primary | ICD-10-CM

## 2022-08-09 DIAGNOSIS — M50.30 DEGENERATIVE DISC DISEASE, CERVICAL: ICD-10-CM

## 2022-08-09 DIAGNOSIS — M54.2 CERVICALGIA: ICD-10-CM

## 2022-08-09 PROCEDURE — 99215 OFFICE O/P EST HI 40 MIN: CPT | Performed by: NURSE PRACTITIONER

## 2022-08-09 RX ORDER — BACLOFEN 10 MG/1
TABLET ORAL 2 TIMES DAILY
COMMUNITY
Start: 2022-08-03 | End: 2022-08-30
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
## 2022-08-29 ENCOUNTER — TREATMENT (OUTPATIENT)
Dept: PHYSICAL THERAPY | Facility: CLINIC | Age: 42
End: 2022-08-29

## 2022-08-29 DIAGNOSIS — R29.898 SHOULDER WEAKNESS: ICD-10-CM

## 2022-08-29 DIAGNOSIS — M54.2 CERVICAL PAIN: Primary | ICD-10-CM

## 2022-08-29 DIAGNOSIS — M79.601 PAIN OF RIGHT UPPER EXTREMITY: ICD-10-CM

## 2022-08-29 PROCEDURE — 97161 PT EVAL LOW COMPLEX 20 MIN: CPT | Performed by: PHYSICAL THERAPIST
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
## 2022-08-30 ENCOUNTER — LAB (OUTPATIENT)
Dept: LAB | Facility: HOSPITAL | Age: 42
End: 2022-08-30

## 2022-08-30 ENCOUNTER — TRANSCRIBE ORDERS (OUTPATIENT)
Dept: LAB | Facility: HOSPITAL | Age: 42
End: 2022-08-30

## 2022-08-30 DIAGNOSIS — Z20.822 ENCOUNTER FOR LABORATORY TESTING FOR COVID-19 VIRUS: ICD-10-CM

## 2022-08-30 DIAGNOSIS — Z20.822 ENCOUNTER FOR LABORATORY TESTING FOR COVID-19 VIRUS: Primary | ICD-10-CM

## 2022-08-30 LAB — SARS-COV-2 RNA PNL SPEC NAA+PROBE: NOT DETECTED

## 2022-08-30 PROCEDURE — U0004 COV-19 TEST NON-CDC HGH THRU: HCPCS

## 2022-08-30 RX ORDER — BACLOFEN 10 MG/1
TABLET ORAL
Qty: 40 TABLET | Refills: 2 | Status: SHIPPED | OUTPATIENT
Start: 2022-08-30 | End: 2023-01-25 | Stop reason: SDUPTHER

## 2022-09-06 ENCOUNTER — TREATMENT (OUTPATIENT)
Dept: PHYSICAL THERAPY | Facility: CLINIC | Age: 42
End: 2022-09-06

## 2022-09-06 DIAGNOSIS — R29.898 SHOULDER WEAKNESS: ICD-10-CM

## 2022-09-06 DIAGNOSIS — M79.601 PAIN OF RIGHT UPPER EXTREMITY: ICD-10-CM

## 2022-09-06 DIAGNOSIS — M54.2 CERVICAL PAIN: Primary | ICD-10-CM

## 2022-09-06 PROCEDURE — 97012 MECHANICAL TRACTION THERAPY: CPT | Performed by: PHYSICAL THERAPIST

## 2022-09-06 PROCEDURE — 97110 THERAPEUTIC EXERCISES: CPT | Performed by: PHYSICAL THERAPIST

## 2022-09-06 NOTE — PROGRESS NOTES
Physical Therapy Daily Treatment Note    VISIT#: 2    Subjective   Mary Go reports 4/10 pain today.  She states the pain is at the base of her neck.  She reports she did some of her exercises but not all of them because she couldn't remember them and lost her handout.  Pain Rating (0-10): 4    Objective     See Exercise, Manual, and Modality Logs for complete treatment.     Assessment/Plan  Mobility and strengthening exercises were initiated today to improve posture and decrease radicular symptoms into R UE.  Mechanical cervical traction was also performed today.    Progress per Plan of Care and Progress strengthening /stabilization /functional activity            Timed:         Manual Therapy:         mins  76560;     Therapeutic Exercise:    24     mins  64622;     Neuromuscular Raúl:        mins  29855;    Therapeutic Activity:          mins  47788;     Gait Training:           mins  27643;     Ultrasound:          mins  64876;    Ionto                                   mins   98801  Self Care                            mins   15132  Aquatic Therapy                 mins   27970    Un-Timed:  Electrical Stimulation:         mins  28812 ([facility] );  Dry Needling          mins self-pay  Traction   15       mins 19537  Low Eval          Mins  97942  Mod Eval          Mins  96287  High Eval                            Mins  12000  Re-Eval                               mins  57536  Canalith Repos                   mins  42489    Timed Treatment:   24   mins   Total Treatment:     39   mins    Cindy Damon PT, DPT  License Number [id number]

## 2022-09-09 ENCOUNTER — TREATMENT (OUTPATIENT)
Dept: PHYSICAL THERAPY | Facility: CLINIC | Age: 42
End: 2022-09-09

## 2022-09-09 DIAGNOSIS — R29.898 SHOULDER WEAKNESS: ICD-10-CM

## 2022-09-09 DIAGNOSIS — M79.601 PAIN OF RIGHT UPPER EXTREMITY: ICD-10-CM

## 2022-09-09 DIAGNOSIS — M54.2 CERVICAL PAIN: Primary | ICD-10-CM

## 2022-09-09 PROCEDURE — 97110 THERAPEUTIC EXERCISES: CPT | Performed by: PHYSICAL THERAPIST

## 2022-09-09 PROCEDURE — 97012 MECHANICAL TRACTION THERAPY: CPT | Performed by: PHYSICAL THERAPIST

## 2022-09-09 NOTE — PROGRESS NOTES
Physical Therapy Daily Treatment Note    VISIT#: 3    Subjective   Mary Go reports she has no pain this morning.  She states the traction felt great last time and she thinks it helped.  She reports she had some pain last night.   Pain Rating (0-10): 0    Objective     See Exercise, Manual, and Modality Logs for complete treatment.     Assessment/Plan  Pt had relief of her pain with traction last session therefore it was performed again today.  Scapular strengthening exercises were advanced and additional exercises were added.  Will continue with postural exercises to help improve neck and arm pain.    Progress per Plan of Care and Progress strengthening /stabilization /functional activity            Timed:         Manual Therapy:         mins  91441;     Therapeutic Exercise:    23     mins  26090;     Neuromuscular Raúl:        mins  51432;    Therapeutic Activity:          mins  14461;     Gait Training:           mins  20041;     Ultrasound:          mins  13262;    Ionto                                   mins   55224  Self Care                            mins   62664  Aquatic Therapy                 mins   92258    Un-Timed:  Electrical Stimulation:         mins  00710 ( );  Dry Needling          mins self-pay  Traction     15     mins 82019  Low Eval          Mins  37949  Mod Eval          Mins  01763  High Eval                            Mins  14976  Re-Eval                               mins  32109  Canalith Repos                   mins  73998    Timed Treatment:   23   mins   Total Treatment:     38   mins    Cindy Damon PT, DPT  License Number [id number]

## 2022-09-10 DIAGNOSIS — F32.A DEPRESSION, UNSPECIFIED DEPRESSION TYPE: ICD-10-CM

## 2022-09-12 RX ORDER — BUSPIRONE HYDROCHLORIDE 10 MG/1
TABLET ORAL
Qty: 60 TABLET | Refills: 5 | Status: SHIPPED | OUTPATIENT
Start: 2022-09-12 | End: 2023-04-03

## 2022-09-12 RX ORDER — CITALOPRAM 20 MG/1
TABLET ORAL
Qty: 30 TABLET | Refills: 5 | Status: SHIPPED | OUTPATIENT
Start: 2022-09-12 | End: 2023-04-03

## 2022-09-19 ENCOUNTER — TREATMENT (OUTPATIENT)
Dept: PHYSICAL THERAPY | Facility: CLINIC | Age: 42
End: 2022-09-19

## 2022-09-19 DIAGNOSIS — R29.898 SHOULDER WEAKNESS: ICD-10-CM

## 2022-09-19 DIAGNOSIS — M79.601 PAIN OF RIGHT UPPER EXTREMITY: ICD-10-CM

## 2022-09-19 DIAGNOSIS — M54.2 CERVICAL PAIN: Primary | ICD-10-CM

## 2022-09-19 PROCEDURE — 97530 THERAPEUTIC ACTIVITIES: CPT | Performed by: PHYSICAL THERAPIST

## 2022-09-19 PROCEDURE — 97012 MECHANICAL TRACTION THERAPY: CPT | Performed by: PHYSICAL THERAPIST

## 2022-09-19 PROCEDURE — 97110 THERAPEUTIC EXERCISES: CPT | Performed by: PHYSICAL THERAPIST

## 2022-09-19 NOTE — PROGRESS NOTES
"Physical Therapy Daily Treatment Note      Patient: Mary Go   : 1980  Referring practitioner: NIKOLE Mercer  Date of Initial Visit: Type: THERAPY  Noted: 2022  Today's Date: 2022  Patient seen for 4 sessions           Subjective  Mary Go reports: she \"rode in a car 9 hours and feels stiff and sore.\" Mary related she is not usually having UE symptoms. \"I have noticed if I get stressed or I sleep wrong I get tense.\" She advised that she was lax on HEP on vacation.    Objective   See Exercise, Manual, and Modality Logs for complete treatment.       Assessment/Plan  Mary is resuming therapy after short absence for vacation. She tolerated increase in traction pull by 1# today. Discussed with Mary that home traction units are available as she commented that this felt good. Informed her that she is able to view traction units online, with PTA commenting many insurances do not reimberse for this device, that she is able to seek that information from her own insurance provider. Mary commented after session that her back is still hurting and she needs to get her nerves burned again.     Visit Diagnoses:    ICD-10-CM ICD-9-CM   1. Cervical pain  M54.2 723.1   2. Pain of right upper extremity  M79.601 729.5   3. Shoulder weakness  R29.898 719.61       Progress per Plan of Care and Progress strengthening /stabilization /functional activity           Timed:  Manual Therapy:         mins  40645;  Therapeutic Exercise:    16     mins  02292;     Neuromuscular Raúl:        mins  95866;    Therapeutic Activity:     10     mins  38660;     Gait Training:           mins  26333;     Ultrasound:          mins  06338;    Electrical Stimulation:         mins  86198 ( );  Aquatics  __   mins   61460    Untimed:  Electrical Stimulation:         mins  19875 ( );  Mechanical Traction:    15     mins  79144;     Timed Treatment:   26   mins   Total Treatment:     41   " mins    Electronically Signed:  Rena Gasca PTA  Physical Therapist Assistant    [place] PTA license [id number]

## 2022-09-21 ENCOUNTER — TREATMENT (OUTPATIENT)
Dept: PHYSICAL THERAPY | Facility: CLINIC | Age: 42
End: 2022-09-21

## 2022-09-21 DIAGNOSIS — M54.2 CERVICAL PAIN: Primary | ICD-10-CM

## 2022-09-21 DIAGNOSIS — R29.898 SHOULDER WEAKNESS: ICD-10-CM

## 2022-09-21 DIAGNOSIS — M79.601 PAIN OF RIGHT UPPER EXTREMITY: ICD-10-CM

## 2022-09-21 PROCEDURE — 97012 MECHANICAL TRACTION THERAPY: CPT | Performed by: PHYSICAL THERAPIST

## 2022-09-21 PROCEDURE — 97530 THERAPEUTIC ACTIVITIES: CPT | Performed by: PHYSICAL THERAPIST

## 2022-09-21 PROCEDURE — 97110 THERAPEUTIC EXERCISES: CPT | Performed by: PHYSICAL THERAPIST

## 2022-09-21 NOTE — PROGRESS NOTES
"Physical Therapy Daily Treatment Note      Patient: Mary Go   : 1980  Referring practitioner: NIKOLE Mercer  Date of Initial Visit: Type: THERAPY  Noted: 2022  Today's Date: 2022  Patient seen for 5 sessions           Subjective  Mary Go reports: \"sore today, more in my low back.\" Mary related she has not looked up home traction units yet.       Objective   See Exercise, Manual, and Modality Logs for complete treatment.       Assessment/Plan   PTA asked Mary if she required traction today, she responded \"The traction is actually my favorite part.\" Mary is showing progress with gross strength, evident with increased resistance utilized during session today. Continue as outlined.    Visit Diagnoses:    ICD-10-CM ICD-9-CM   1. Cervical pain  M54.2 723.1   2. Pain of right upper extremity  M79.601 729.5   3. Shoulder weakness  R29.898 719.61       Progress per Plan of Care and Progress strengthening /stabilization /functional activity           Timed:  Manual Therapy:         mins  18170;  Therapeutic Exercise:    18     mins  56636;     Neuromuscular Raúl:        mins  23653;    Therapeutic Activity:     8     mins  62980;     Gait Training:           mins  10741;     Ultrasound:          mins  25050;    Electrical Stimulation:         mins  48786 ( );  Aquatics  __   mins   02866    Untimed:  Electrical Stimulation:         mins  63179 ( );  Mechanical Traction:   15      mins  44026;     Timed Treatment:   26   mins   Total Treatment:     41   mins    Electronically Signed:  Rena Gasca PTA  Physical Therapist Assistant    [place] PTA license [id number]            "

## 2022-09-26 ENCOUNTER — TREATMENT (OUTPATIENT)
Dept: PHYSICAL THERAPY | Facility: CLINIC | Age: 42
End: 2022-09-26

## 2022-09-26 DIAGNOSIS — M79.601 PAIN OF RIGHT UPPER EXTREMITY: ICD-10-CM

## 2022-09-26 DIAGNOSIS — R29.898 SHOULDER WEAKNESS: ICD-10-CM

## 2022-09-26 DIAGNOSIS — M54.2 CERVICAL PAIN: Primary | ICD-10-CM

## 2022-09-26 PROCEDURE — 97530 THERAPEUTIC ACTIVITIES: CPT | Performed by: PHYSICAL THERAPIST

## 2022-09-26 PROCEDURE — 97012 MECHANICAL TRACTION THERAPY: CPT | Performed by: PHYSICAL THERAPIST

## 2022-09-26 PROCEDURE — 97110 THERAPEUTIC EXERCISES: CPT | Performed by: PHYSICAL THERAPIST

## 2022-09-26 NOTE — PROGRESS NOTES
"Physical Therapy Daily Treatment Note      Patient: Mary Go   : 1980  Referring practitioner: NIKOLE Mercer  Date of Initial Visit: Type: THERAPY  Noted: 2022  Today's Date: 2022  Patient seen for 6 sessions           Subjective  Mary Go reports: she is \"okay. It's [age] [birth year].\"       Objective   Mary denies having checked into a foam roller or home traction unit .    See Exercise, Manual, and Modality Logs for complete treatment.       Assessment/Plan  Further advancement in exercises while here today, tolerated well. \"I feel my arms burning,\" she stated as she completed exercises. PTA informed her that is the result of muscles working from exercises. Mary is to undergo progress note/reassessment  next session.    Visit Diagnoses:    ICD-10-CM ICD-9-CM   1. Cervical pain  M54.2 723.1   2. Pain of right upper extremity  M79.601 729.5   3. Shoulder weakness  R29.898 719.61                Timed:  Manual Therapy:         mins  78560;  Therapeutic Exercise:    15     mins  00615;     Neuromuscular Raúl:        mins  24768;    Therapeutic Activity:     9     mins  03715;     Gait Training:           mins  24593;     Ultrasound:          mins  06620;    Electrical Stimulation:         mins  56211 ( );  Aquatics  __   mins   73956    Untimed:  Electrical Stimulation:         mins  98037 ( );  Mechanical Traction:    15     mins  78522;     Timed Treatment:   24   mins   Total Treatment:     39   mins    Electronically Signed:  Rena Gasca PTA  Physical Therapist Assistant    [place] PTA license [id number]            "

## 2022-09-28 ENCOUNTER — TREATMENT (OUTPATIENT)
Dept: PHYSICAL THERAPY | Facility: CLINIC | Age: 42
End: 2022-09-28

## 2022-09-28 DIAGNOSIS — R29.898 SHOULDER WEAKNESS: ICD-10-CM

## 2022-09-28 DIAGNOSIS — M79.601 PAIN OF RIGHT UPPER EXTREMITY: ICD-10-CM

## 2022-09-28 DIAGNOSIS — M54.2 CERVICAL PAIN: Primary | ICD-10-CM

## 2022-09-28 PROCEDURE — 97110 THERAPEUTIC EXERCISES: CPT | Performed by: PHYSICAL THERAPIST

## 2022-09-28 NOTE — PROGRESS NOTES
Progress Assessment/Discharge Summary        Patient: Mary Go   : 1980  Diagnosis/ICD-10 Code:  Cervical pain [M54.2]  Referring practitioner: NIKOLE Mercer  Date of Initial Visit: Type: THERAPY  Noted: 2022  Today's Date: 2022  Patient seen for 7 sessions      Subjective:   Mary Go reports: 80% improvement in her symptoms into her arm since starting therapy.  She states she still has some pain across the top of her upper back.  Her pain in her neck has been a 7/10 at worst and is a 4/10 on average.  She states it's hard to give her pain a number because her back and sciatic pain are bothering her.      Subjective Questionnaire: NDI:20/40% limited  Clinical Progress: improved  Home Program Compliance: No  Treatment has included: therapeutic exercise and traction    Subjective     Objective   Active Range of Motion   Cervical/Thoracic Spine   Cervical     Flexion: 60 degrees   Extension: 22 degrees   Left lateral flexion: 40 degrees   Right lateral flexion: 46 degrees   Left rotation: 62 degrees   Right rotation: 60 degrees     Left Shoulder   Normal active range of motion    Right Shoulder   Normal active range of motion     Passive Range of Motion     Additional Passive Range of Motion Details  Normal mobility through cervical spine with PA mob  Mild hypomobility through upper thoracic spine with PA mob  Normal mobility through lower thoracic spine with PA mob     Strength/Myotome Testing     Left Shoulder      Planes of Motion   Flexion: 4-   Abduction: 4-   External rotation at 0°: 5   Internal rotation at 0°: 5      Isolated Muscles   Middle trapezius: 4+    Right Shoulder      Planes of Motion   Flexion: 5   Abduction: 4   External rotation at 0°: 5   Internal rotation at 0°: 5      Isolated Muscles   Middle trapezius: 4     Left Elbow   Flexion: 5  Extension: 5     Right Elbow   Flexion: 5  Extension: 5     Left Wrist/Hand   Wrist extension: 5  Wrist flexion:  5     Right Wrist/Hand   Wrist extension: 5  Wrist flexion: 5    Assessment/Plan  Pt has performed scapular/postural strengthening exercises and had mechanical cervical traction to help relieve pain.  She has had an improvement in her radicular symptoms into R UE and in her B scapular strength.  Pt's score on the NDI has increased.  She has partially met goals and will discharge from PT.  She was provided with an additional HEP handout.    Goals  Plan Goals: 1. Carrying, Moving, and Handling Objects Functional Limitation                        LTG 1: 12 weeks:  The patient will demonstrate 16% limitation by achieving a score of 8 on the Neck Disability Index.   STATUS:  not met  STG 1a: 6 weeks:  The patient will demonstrate 20% limitation by achieving a score of 10 on the Neck Disability Index.     STATUS:  not met     2. The patient has complaints of pain.   LTG 2: 12 weeks:  The patient will report 2/10 pain in order to more easily tolerate activities of daily living and improve sleep quality.   STATUS:  not met  STG 2a: 6 weeks:  The patient will report 3/10 pain.   STATUS:  not met     3. The patient reports radicular symptoms in the R upper extremity.   LTG 3: 12 weeks:  The patient will report a decrease in radicular symptoms in the R upper extremity by 75%.   STATUS:  met  STG 3a: 6 weeks:  The patient will report a decrease in radicular symptoms in the R upper extremity by 50%.   STATUS:  met    4. The patient has scapular weakness.  LTG 4: 12 weeks: The patient will improve B scapular strength to 4+/5 in order to improve posture and decrease pain.  STATUS: partially met  STG 4a: 6 weeks: The patient will improve B scapular strength to 4/5 in order to improve posture and decrease pain.  STATUS: met   Progress toward previous goals: Partially Met    See Exercise, Manual, and Modality Logs for complete treatment.         Recommendations: Discharge      PT Signature: Cindy Damon PT, DPT  License Number:  [id number]    Based upon review of the patient's progress and continued therapy plan, it is my medical opinion that Mary Go should continue physical therapy treatment at [facility]  [address]  [place] [address] [phone].      Timed:         Manual Therapy:         mins  00703;     Therapeutic Exercise:    15     mins  25637;     Neuromuscular Raúl:        mins  31517;    Therapeutic Activity:          mins  00260;     Gait Training:           mins  21565;     Ultrasound:          mins  43521;    Ionto                                   mins   35079  Self Care                            mins   03499  Aquatic                               mins 14968      Un-Timed:  Electrical Stimulation:         mins  45773 ( );  Dry Needling          mins self-pay  Traction          mins 51343  Low Eval          Mins  29475  Mod Eval          Mins  12299  High Eval                            Mins  03848  Re-Eval                               mins  57566  Canalith Repos         mins 29833    Timed Treatment:   15   mins   Total Treatment:     21   mins      I certify that the therapy services are furnished while this patient is under my care.  The services outlined above are required by this patient, and will be reviewed every 90 days.

## 2022-10-02 ENCOUNTER — HOSPITAL ENCOUNTER (EMERGENCY)
Facility: HOSPITAL | Age: 42
Discharge: HOME | End: 2022-10-02
Attending: EMERGENCY MEDICINE | Admitting: EMERGENCY MEDICINE

## 2022-10-02 ENCOUNTER — APPOINTMENT (OUTPATIENT)
Dept: GENERAL RADIOLOGY | Facility: HOSPITAL | Age: 42
End: 2022-10-02

## 2022-10-02 VITALS
HEART RATE: 85 BPM | OXYGEN SATURATION: 99 % | WEIGHT: 246.03 LBS | BODY MASS INDEX: 43.59 KG/M2 | HEIGHT: 63 IN | DIASTOLIC BLOOD PRESSURE: 77 MMHG | RESPIRATION RATE: 16 BRPM | TEMPERATURE: 98.1 F | SYSTOLIC BLOOD PRESSURE: 135 MMHG

## 2022-10-02 DIAGNOSIS — S92.351A CLOSED DISPLACED FRACTURE OF FIFTH METATARSAL BONE OF RIGHT FOOT, INITIAL ENCOUNTER: Primary | ICD-10-CM

## 2022-10-02 PROCEDURE — 99282 EMERGENCY DEPT VISIT SF MDM: CPT

## 2022-10-02 PROCEDURE — 73620 X-RAY EXAM OF FOOT: CPT

## 2022-10-02 RX ORDER — IBUPROFEN 800 MG/1
800 TABLET ORAL EVERY 6 HOURS PRN
Qty: 30 TABLET | Refills: 0 | Status: SHIPPED | OUTPATIENT
Start: 2022-10-02

## 2022-10-02 NOTE — ED PROVIDER NOTES
Subjective     History provided by:  Patient  Foot Pain  Location:  Right foot over 5th metatarsal area  Quality:  Ache  Severity:  Severe  Onset quality:  Sudden  Duration:  2 hours  Timing:  Constant  Progression:  Unchanged  Chronicity:  New  Context:  Pt reports she was taking trash out and rolled her ankle and then felt popping sensation and severe pain on the right lateral foot   Relieved by:  Nothing   Worsened by:  Bearing weight  Ineffective treatments:  None tried  Associated symptoms: no abdominal pain, no chest pain, no congestion, no cough, no diarrhea, no ear pain, no fatigue, no fever, no headaches, no loss of consciousness, no myalgias, no nausea, no rash, no rhinorrhea, no shortness of breath, no sore throat, no vomiting and no wheezing        Review of Systems   Constitutional: Negative for chills, fatigue and fever.   HENT: Negative for congestion, ear pain, rhinorrhea and sore throat.    Eyes: Negative for pain.   Respiratory: Negative for cough, chest tightness, shortness of breath and wheezing.    Cardiovascular: Negative for chest pain.   Gastrointestinal: Negative for abdominal pain, diarrhea, nausea and vomiting.   Genitourinary: Negative for flank pain and hematuria.   Musculoskeletal: Positive for arthralgias and gait problem. Negative for joint swelling and myalgias.   Skin: Negative for pallor and rash.   Neurological: Negative for seizures, loss of consciousness and headaches.   All other systems reviewed and are negative.      Past Medical History:   Diagnosis Date   • Allergy, unspecified, initial encounter    • Anemia    • Arthritis    • Asthma    • Colitis, ulcerative ([facility]) 2002   • Diabetes mellitus type 2 in nonobese ([facility]) 03/06/2018   • Limb swelling    • Obesity 03/06/2018   • Pain in both knees 03/20/2018    UNSPECIFIED CHRONICITY   • Psoriasis 07/30/2019   • Reflux esophagitis    • Seasonal allergies    • Sinusitis, chronic    • Skin disorder    • SOB (shortness of breath)     • Tendonitis 03/20/2018    INSERTIONAL PATELLAR TENDONITIS, BILATERAL       Allergies   Allergen Reactions   • Penicillins Unknown - High Severity     unknown   • Sulfa Antibiotics Rash and Other (See Comments)     .   • Latex Rash       Past Surgical History:   Procedure Laterality Date   • CERVICAL POLYPECTOMY  2001   • COLONOSCOPY  07/13/2018    REPEAT IN 3 YEARS ([facility])   • DIAGNOSTIC LAPAROSCOPY     • ENDOSCOPY  07/31/2018   • POLYPECTOMY  2002    STOMACH POLYP REMOVED   • WISDOM TOOTH EXTRACTION  1997       Family History   Problem Relation Age of Onset   • Diabetes Mother         UNSPECIFIED TYPE   • Arthritis Mother    • Osteoporosis Mother    • Stroke Sister    • Diabetes Sister         UNSPECIFIED TYPE   • Arthritis Sister    • Osteoporosis Sister    • Breast cancer Maternal Grandmother         MALIGNANT   • Heart disease Maternal Grandmother    • Cancer Maternal Grandmother         UNSPECIFIED   • Stroke Maternal Grandfather    • Heart disease Maternal Grandfather    • Breast cancer Paternal Grandmother         MALIGNANT   • Cancer Paternal Grandfather         UNSPECIFIED   • COPD Other    • Emphysema Other    • Diabetes Other        Social History     Socioeconomic History   • Marital status: Single   Tobacco Use   • Smoking status: Former Smoker     Packs/day: 0.50     Years: 10.00     Pack years: 5.00   • Smokeless tobacco: Never Used   • Tobacco comment: STARTED AT AGE 16 STOPPED AT AGE 33/ QUIT SMOKING IN 2014   Vaping Use   • Vaping Use: Never used   Substance and Sexual Activity   • Alcohol use: Never   • Drug use: Never   • Sexual activity: Defer           Objective   Physical Exam  Vitals and nursing note reviewed.   Constitutional:       General: She is not in acute distress.     Appearance: Normal appearance. She is not toxic-appearing.   HENT:      Head: Normocephalic and atraumatic.      Mouth/Throat:      Mouth: Mucous membranes are moist.   Eyes:      General: No scleral  icterus.  Cardiovascular:      Rate and Rhythm: Normal rate and regular rhythm.      Pulses: Normal pulses.           Dorsalis pedis pulses are 2+ on the right side.        Posterior tibial pulses are 2+ on the right side.      Heart sounds: Normal heart sounds.   Pulmonary:      Effort: Pulmonary effort is normal. No respiratory distress.      Breath sounds: Normal breath sounds.   Abdominal:      General: Abdomen is flat.      Palpations: Abdomen is soft.      Tenderness: There is no abdominal tenderness.   Musculoskeletal:      Cervical back: Normal range of motion and neck supple.      Right foot: Decreased range of motion.        Feet:    Feet:      Right foot:      Skin integrity: Skin integrity normal.   Skin:     General: Skin is warm and dry.   Neurological:      Mental Status: She is alert and oriented to person, place, and time. Mental status is at baseline.         Procedures           ED Course                                           MDM  Number of Diagnoses or Management Options  Closed displaced fracture of fifth metatarsal bone of right foot, initial encounter: new and requires workup  Diagnosis management comments: I have spoken with the patient. I have explained the patient´s condition, diagnoses and treatment plan based on the information available to me at this time. I have answered the patient's questions and addressed any concerns. The patient has a good  understanding of the patient´s diagnosis, condition, and treatment plan as can be expected at this point. The vital signs have been stable. The patient´s condition is stable and appropriate for discharge from the emergency department.      The patient will pursue further outpatient evaluation with the primary care physician or other designated or consulting physician as outlined in the discharge instructions. They are agreeable to this plan of care and follow-up instructions have been explained in detail. The patient has received these  instructions in written format and have expressed an understanding of the discharge instructions. The patient is aware that any significant change in condition or worsening of symptoms should prompt an immediate return to this or the closest emergency department or call to [facility].       Amount and/or Complexity of Data Reviewed  Tests in the radiology section of CPT®: reviewed  Discuss the patient with other providers: yes (Dr. Reagan-ortho on call-place in boot, crutches, non weight bearing, follow up in office. )    Risk of Complications, Morbidity, and/or Mortality  Presenting problems: moderate  Diagnostic procedures: low  Management options: low    Patient Progress  Patient progress: stable      Final diagnoses:   Closed displaced fracture of fifth metatarsal bone of right foot, initial encounter       ED Disposition  ED Disposition     ED Disposition   Discharge    Condition   Stable    Comment   --             Nickolas Reagan MD  [address] [address]  [phone]      As needed    Corby Schaeffer PA  [address] [address]  [place] [address]  [phone]               Medication List      New Prescriptions    ibuprofen 800 MG tablet  Commonly known as: ADVIL,MOTRIN  Take 1 tablet by mouth Every 6 (Six) Hours As Needed for Mild Pain.        Changed    cyclobenzaprine 10 MG tablet  Commonly known as: FLEXERIL  Take 1 tablet by mouth 3 (Three) Times a Day As Needed for Muscle Spasms.  What changed: when to take this        Stop    meloxicam 7.5 MG tablet  Commonly known as: MOBIC           Where to Get Your Medications      These medications were sent to [place], [place] - [address] [place] [address], [address] - [phone] [facility] [phone]   [address], [facility] [address]    Phone: [phone]   · ibuprofen 800 MG tablet          Esteban Elaine, APRN  10/02/22 1544

## 2022-10-02 NOTE — DISCHARGE INSTRUCTIONS
No weight bearing until further instructions from ortho/Dr. Reagan. RICE therapy until further directed.

## 2022-10-05 ENCOUNTER — OFFICE VISIT (OUTPATIENT)
Dept: ORTHOPEDIC SURGERY | Facility: CLINIC | Age: 42
End: 2022-10-05

## 2022-10-05 VITALS — WEIGHT: 240 LBS | BODY MASS INDEX: 42.52 KG/M2 | HEIGHT: 63 IN | OXYGEN SATURATION: 97 % | HEART RATE: 96 BPM

## 2022-10-05 DIAGNOSIS — S92.354A CLOSED NONDISPLACED FRACTURE OF FIFTH METATARSAL BONE OF RIGHT FOOT, INITIAL ENCOUNTER: Primary | ICD-10-CM

## 2022-10-05 DIAGNOSIS — M54.2 NECK PAIN WITHOUT INJURY: ICD-10-CM

## 2022-10-05 DIAGNOSIS — T78.40XS ALLERGY, SEQUELA: ICD-10-CM

## 2022-10-05 PROCEDURE — 99203 OFFICE O/P NEW LOW 30 MIN: CPT | Performed by: STUDENT IN AN ORGANIZED HEALTH CARE EDUCATION/TRAINING PROGRAM

## 2022-10-05 RX ORDER — CYCLOBENZAPRINE HCL 10 MG
10 TABLET ORAL 3 TIMES DAILY PRN
Qty: 60 TABLET | Refills: 0 | Status: SHIPPED | OUTPATIENT
Start: 2022-10-05 | End: 2023-01-25 | Stop reason: SDUPTHER

## 2022-10-05 RX ORDER — FLUTICASONE PROPIONATE 50 MCG
SPRAY, SUSPENSION (ML) NASAL
Qty: 16 G | Refills: 0 | Status: SHIPPED | OUTPATIENT
Start: 2022-10-05 | End: 2023-01-25 | Stop reason: SDUPTHER

## 2022-10-05 RX ORDER — LANCETS 28 GAUGE
EACH MISCELLANEOUS
COMMUNITY
Start: 2022-09-22 | End: 2022-11-29

## 2022-10-05 NOTE — PROGRESS NOTES
"Chief Complaint  Initial Evaluation and Pain of the Right Foot    Subjective          Mary Go presents to [facility] for   History of Present Illness    The patient presents here today for evaluation of the right foot. The patient reports she rolled her ankle on 10/2/22 and sustained a 5th metatarsal fracture. The patient was seen and evaluated with x-rays and placed into a boot. She has no other complaints. She is a diabetic.     Allergies   Allergen Reactions   • Penicillins Unknown - High Severity     unknown   • Sulfa Antibiotics Rash and Other (See Comments)     .   • Latex Rash        Social History     Socioeconomic History   • Marital status: Single   Tobacco Use   • Smoking status: Former Smoker     Packs/day: 0.50     Years: 10.00     Pack years: 5.00     Types: Cigarettes   • Smokeless tobacco: Never Used   • Tobacco comment: Quit smoking over 9 years ago   Vaping Use   • Vaping Use: Never used   Substance and Sexual Activity   • Alcohol use: Never   • Drug use: Never   • Sexual activity: Not Currently     Partners: Male     Birth control/protection: Condom, Pill        I reviewed the patient's chief complaint, history of present illness, review of systems, past medical history, surgical history, family history, social history, medications, and allergy list.     REVIEW OF SYSTEMS    Constitutional: Denies fevers, chills, weight loss  Cardiovascular: Denies chest pain, shortness of breath  Skin: Denies rashes, acute skin changes  Neurologic: Denies headache, loss of consciousness  MSK: Right foot pain      Objective   Vital Signs:   Pulse 96   Ht 160 cm (63\")   Wt 109 kg (240 lb)   SpO2 97%   BMI 42.51 kg/m²     Body mass index is 42.51 kg/m².    Physical Exam    General: Alert. No acute distress.   Right foot- mild swelling and bruising to dorsal and lateral mid foot. Tender to the 5th metatarsal. Non-tender to the remaining foot. Achilles intact. Intact plantar " flexion and dorsiflexion. Non-tender to syndesmosis. Neurovascularly intact. Calf soft. Positive EHL, FHL, GS and TA. Sensation intact to all 5 nerves of the foot. Positive pulses.     Procedures    Imaging Results (Most Recent)     None                   Assessment and Plan        XR Foot 2 View Right    Result Date: 10/2/2022  Narrative: PROCEDURE: XR FOOT 2 VW RIGHT  COMPARISON: [facility], CR, FOOT >OR= 3V RT, 4/30/2019, 8:55.  INDICATIONS: generalized right foot pain after injury today  FINDINGS:  There is an oblique fracture of the 5th metatarsal.  There is mild medial displacement of the distal 5th metatarsal.  There is surrounding soft tissue edema.  No other acute osseous abnormality is identified.  Bipartite medial sesamoid, as before.      Impression:   1. Oblique fracture of the 5th metatarsal. 2. Mild medial displacement of the distal 5th metatarsal.      JACQUIE POSADA MD       Electronically Signed and Approved By: JACQUIE POSADA MD on 10/02/2022 at 15:01                Diagnoses and all orders for this visit:    1. Closed nondisplaced fracture of fifth metatarsal bone of right foot, initial encounter (Primary)        Discussed the treatment plan with the patient. I reviewed the x-rays that were obtained previously with the patient.   Plan to continue CAM walker boot, weight bearing through the heel. Order for a knee scooter given today. I advised her to ice and elevate. She can remove the boot to work on gentle ROM.     Will obtain X-Rays of Right foot at next visit.     Call or return if worsening symptoms.    Scribed for Nickolas Reagan MD by Mary Boo  10/05/2022   09:17 EDT         Follow Up   Return in about 2 weeks (around 10/19/2022).  Patient was given instructions and counseling regarding her condition or for health maintenance advice. Please see specific information pulled into the AVS if appropriate.       I have personally performed the services described in  this document as scribed by the above individual and it is both accurate and complete.     Nickolas Reagan MD  10/05/22  09:23 EDT

## 2022-10-19 ENCOUNTER — OFFICE VISIT (OUTPATIENT)
Dept: ORTHOPEDIC SURGERY | Facility: CLINIC | Age: 42
End: 2022-10-19

## 2022-10-19 VITALS — BODY MASS INDEX: 42.52 KG/M2 | HEIGHT: 63 IN | WEIGHT: 240 LBS

## 2022-10-19 DIAGNOSIS — S92.354D CLOSED NONDISPLACED FRACTURE OF FIFTH METATARSAL BONE OF RIGHT FOOT WITH ROUTINE HEALING, SUBSEQUENT ENCOUNTER: Primary | ICD-10-CM

## 2022-10-19 PROCEDURE — 99213 OFFICE O/P EST LOW 20 MIN: CPT | Performed by: PHYSICIAN ASSISTANT

## 2022-10-19 NOTE — PROGRESS NOTES
"Chief Complaint  Follow-up of the Right Foot    Subjective          Mary Go presents to [facility] for   History of Present Illness    Mary Go presents today for a follow-up of her right foot.  Patient is a right fifth metatarsal fracture with initial injury 10/2/2022.  Today, patient states she is doing okay.  She reports her pain fluctuates.  She has been taking ibuprofen 800 mg every day.  She denies numbness or tingling.  Denies swelling.  She is ambulating with her fracture boot majority of the time.  She denies new injuries.      Allergies   Allergen Reactions   • Penicillins Unknown - High Severity     unknown   • Sulfa Antibiotics Rash and Other (See Comments)     .   • Latex Rash        Social History     Socioeconomic History   • Marital status: Single   Tobacco Use   • Smoking status: Former     Packs/day: 0.50     Years: 10.00     Pack years: 5.00     Types: Cigarettes   • Smokeless tobacco: Never   • Tobacco comments:     Quit smoking over 9 years ago   Vaping Use   • Vaping Use: Never used   Substance and Sexual Activity   • Alcohol use: Never   • Drug use: Never   • Sexual activity: Not Currently     Partners: Male     Birth control/protection: Condom, Pill        I reviewed the patient's chief complaint, history of present illness, review of systems, past medical history, surgical history, family history, social history, medications, and allergy list.     REVIEW OF SYSTEMS    Constitutional: Denies fevers, chills, weight loss  Cardiovascular: Denies chest pain, shortness of breath  Skin: Denies rashes, acute skin changes  Neurologic: Denies headache, loss of consciousness  MSK: Right foot pain      Objective   Vital Signs:   Ht 160 cm (63\")   Wt 109 kg (240 lb)   BMI 42.51 kg/m²     Body mass index is 42.51 kg/m².    Physical Exam    General: Alert. No acute distress.   Right lower extremity: Resolving bruising to the foot.  Tenderness to the fifth " metatarsal region.  No other areas of tenderness.  Calf soft, nontender.  Achilles intact.  Demonstrates active ankle plantarflexion and dorsiflexion.  Toe range of motion intact.  Sensation intact to the dorsal and plantar foot.  Palpable pedal pulses.    Procedures    Imaging Results (Most Recent)     Procedure Component Value Units Date/Time    XR Foot 3+ View Right [[id number]] Resulted: 10/19/22 1006     Updated: 10/19/22 1007    Narrative:      Indications: Follow-up right fifth metatarsal fracture    Views: AP, oblique, lateral right foot    Findings: Right fifth metatarsal fracture is again seen.  Fracture   alignment remains stable.  Increase in callus formation about the fracture   line.  All joints appear anatomically aligned.  No additional fractures.    Comparative Data: Comparative data found and reviewed today                   Assessment and Plan    Diagnoses and all orders for this visit:    1. Closed nondisplaced fracture of fifth metatarsal bone of right foot with routine healing, subsequent encounter (Primary)  -     XR Foot 3+ View Right        Mary oG presents today for follow-up of her right fifth metatarsal fracture with initial injury 10/2/2022.  X-rays reviewed with the patient today.  Patient will continue fracture boot at this time.  We discussed weight-bear as tolerated through the heel while in the fracture boot.  Do not ambulate without the fracture boot at this time.  Gentle range of motion exercises for the ankle were demonstrated today.  She instructed to perform these at home as well as toe range of motion exercises.  Use ice and elevation as needed for inflammation.  Patient directed take Tylenol as needed for pain.  Patient will follow up in 3 weeks for reevaluation.  We will obtain x-rays of right foot at next visit.    Call or return if symptoms worsen or patient has any concerns.   Will obtain X-Rays of right foot at next visit.         Follow Up   Return in about 3  weeks (around 11/9/2022).  Patient was given instructions and counseling regarding her condition or for health maintenance advice. Please see specific information pulled into the AVS if appropriate.     Renee Alan PA-C  10/19/22  10:07 EDT

## 2022-11-01 ENCOUNTER — APPOINTMENT (OUTPATIENT)
Dept: ULTRASOUND IMAGING | Facility: HOSPITAL | Age: 42
End: 2022-11-01

## 2022-11-01 ENCOUNTER — HOSPITAL ENCOUNTER (EMERGENCY)
Facility: HOSPITAL | Age: 42
Discharge: HOME | End: 2022-11-01
Attending: EMERGENCY MEDICINE | Admitting: EMERGENCY MEDICINE

## 2022-11-01 VITALS
WEIGHT: 242.51 LBS | RESPIRATION RATE: 18 BRPM | HEIGHT: 63 IN | OXYGEN SATURATION: 96 % | BODY MASS INDEX: 42.97 KG/M2 | SYSTOLIC BLOOD PRESSURE: 112 MMHG | DIASTOLIC BLOOD PRESSURE: 70 MMHG | HEART RATE: 88 BPM | TEMPERATURE: 98.3 F

## 2022-11-01 DIAGNOSIS — R10.9 ABDOMINAL PAIN OF UNKNOWN CAUSE: ICD-10-CM

## 2022-11-01 DIAGNOSIS — N39.0 ACUTE URINARY TRACT INFECTION: Primary | ICD-10-CM

## 2022-11-01 LAB
ALBUMIN SERPL-MCNC: 4.2 G/DL (ref 3.5–5.2)
ALBUMIN/GLOB SERPL: 1.3 G/DL
ALP SERPL-CCNC: 89 U/L (ref 39–117)
ALT SERPL W P-5'-P-CCNC: 14 U/L (ref 1–33)
AMORPH URATE CRY URNS QL MICRO: ABNORMAL /HPF
ANION GAP SERPL CALCULATED.3IONS-SCNC: 12.9 MMOL/L (ref 5–15)
AST SERPL-CCNC: 16 U/L (ref 1–32)
BACTERIA UR QL AUTO: ABNORMAL /HPF
BASOPHILS # BLD AUTO: 0.05 10*3/MM3 (ref 0–0.2)
BASOPHILS NFR BLD AUTO: 0.5 % (ref 0–1.5)
BILIRUB SERPL-MCNC: 0.8 MG/DL (ref 0–1.2)
BILIRUB UR QL STRIP: NEGATIVE
BUN SERPL-MCNC: 9 MG/DL (ref 6–20)
BUN/CREAT SERPL: 11.1 (ref 7–25)
CALCIUM SPEC-SCNC: 9.4 MG/DL (ref 8.6–10.5)
CHLORIDE SERPL-SCNC: 102 MMOL/L (ref 98–107)
CLARITY UR: ABNORMAL
CO2 SERPL-SCNC: 22.1 MMOL/L (ref 22–29)
COLOR UR: YELLOW
CREAT SERPL-MCNC: 0.81 MG/DL (ref 0.57–1)
DEPRECATED RDW RBC AUTO: 39.7 FL (ref 37–54)
EGFRCR SERPLBLD CKD-EPI 2021: 92.5 ML/MIN/1.73
EOSINOPHIL # BLD AUTO: 0.21 10*3/MM3 (ref 0–0.4)
EOSINOPHIL NFR BLD AUTO: 2.2 % (ref 0.3–6.2)
ERYTHROCYTE [DISTWIDTH] IN BLOOD BY AUTOMATED COUNT: 11.9 % (ref 12.3–15.4)
GLOBULIN UR ELPH-MCNC: 3.2 GM/DL
GLUCOSE SERPL-MCNC: 124 MG/DL (ref 65–99)
GLUCOSE UR STRIP-MCNC: NEGATIVE MG/DL
HCG INTACT+B SERPL-ACNC: <0.5 MIU/ML
HCT VFR BLD AUTO: 41.5 % (ref 34–46.6)
HGB BLD-MCNC: 14.7 G/DL (ref 12–15.9)
HGB UR QL STRIP.AUTO: ABNORMAL
HOLD SPECIMEN: NORMAL
HOLD SPECIMEN: NORMAL
HYALINE CASTS UR QL AUTO: ABNORMAL /LPF
IMM GRANULOCYTES # BLD AUTO: 0.02 10*3/MM3 (ref 0–0.05)
IMM GRANULOCYTES NFR BLD AUTO: 0.2 % (ref 0–0.5)
KETONES UR QL STRIP: NEGATIVE
LEUKOCYTE ESTERASE UR QL STRIP.AUTO: ABNORMAL
LIPASE SERPL-CCNC: 28 U/L (ref 13–60)
LYMPHOCYTES # BLD AUTO: 2.51 10*3/MM3 (ref 0.7–3.1)
LYMPHOCYTES NFR BLD AUTO: 25.9 % (ref 19.6–45.3)
MCH RBC QN AUTO: 32.1 PG (ref 26.6–33)
MCHC RBC AUTO-ENTMCNC: 35.4 G/DL (ref 31.5–35.7)
MCV RBC AUTO: 90.6 FL (ref 79–97)
MONOCYTES # BLD AUTO: 0.71 10*3/MM3 (ref 0.1–0.9)
MONOCYTES NFR BLD AUTO: 7.3 % (ref 5–12)
NEUTROPHILS NFR BLD AUTO: 6.18 10*3/MM3 (ref 1.7–7)
NEUTROPHILS NFR BLD AUTO: 63.9 % (ref 42.7–76)
NITRITE UR QL STRIP: NEGATIVE
NRBC BLD AUTO-RTO: 0 /100 WBC (ref 0–0.2)
PH UR STRIP.AUTO: 5.5 [PH] (ref 5–8)
PLATELET # BLD AUTO: 293 10*3/MM3 (ref 140–450)
PMV BLD AUTO: 9 FL (ref 6–12)
POTASSIUM SERPL-SCNC: 4.1 MMOL/L (ref 3.5–5.2)
PROT SERPL-MCNC: 7.4 G/DL (ref 6–8.5)
PROT UR QL STRIP: ABNORMAL
RBC # BLD AUTO: 4.58 10*6/MM3 (ref 3.77–5.28)
RBC # UR STRIP: ABNORMAL /HPF
REF LAB TEST METHOD: ABNORMAL
SODIUM SERPL-SCNC: 137 MMOL/L (ref 136–145)
SP GR UR STRIP: >=1.03 (ref 1–1.03)
SQUAMOUS #/AREA URNS HPF: ABNORMAL /HPF
UROBILINOGEN UR QL STRIP: ABNORMAL
WBC # UR STRIP: ABNORMAL /HPF
WBC NRBC COR # BLD: 9.68 10*3/MM3 (ref 3.4–10.8)
WHOLE BLOOD HOLD COAG: NORMAL
WHOLE BLOOD HOLD SPECIMEN: NORMAL

## 2022-11-01 PROCEDURE — 25010000002 ONDANSETRON PER 1 MG: Performed by: EMERGENCY MEDICINE

## 2022-11-01 PROCEDURE — 84702 CHORIONIC GONADOTROPIN TEST: CPT

## 2022-11-01 PROCEDURE — 76705 ECHO EXAM OF ABDOMEN: CPT

## 2022-11-01 PROCEDURE — 99283 EMERGENCY DEPT VISIT LOW MDM: CPT

## 2022-11-01 PROCEDURE — 85025 COMPLETE CBC W/AUTO DIFF WBC: CPT

## 2022-11-01 PROCEDURE — 80053 COMPREHEN METABOLIC PANEL: CPT

## 2022-11-01 PROCEDURE — 81001 URINALYSIS AUTO W/SCOPE: CPT | Performed by: EMERGENCY MEDICINE

## 2022-11-01 PROCEDURE — 96374 THER/PROPH/DIAG INJ IV PUSH: CPT

## 2022-11-01 PROCEDURE — 83690 ASSAY OF LIPASE: CPT

## 2022-11-01 RX ORDER — ONDANSETRON 2 MG/ML
4 INJECTION INTRAMUSCULAR; INTRAVENOUS ONCE
Status: COMPLETED | OUTPATIENT
Start: 2022-11-01 | End: 2022-11-01

## 2022-11-01 RX ORDER — ONDANSETRON 4 MG/1
4 TABLET, ORALLY DISINTEGRATING ORAL EVERY 8 HOURS PRN
Qty: 12 TABLET | Refills: 0 | Status: SHIPPED | OUTPATIENT
Start: 2022-11-01

## 2022-11-01 RX ORDER — SODIUM CHLORIDE 0.9 % (FLUSH) 0.9 %
10 SYRINGE (ML) INJECTION AS NEEDED
Status: DISCONTINUED | OUTPATIENT
Start: 2022-11-01 | End: 2022-11-01 | Stop reason: HOSPADM

## 2022-11-01 RX ORDER — CEPHALEXIN 250 MG/1
500 CAPSULE ORAL ONCE
Status: COMPLETED | OUTPATIENT
Start: 2022-11-01 | End: 2022-11-01

## 2022-11-01 RX ORDER — CEPHALEXIN 500 MG/1
500 CAPSULE ORAL 4 TIMES DAILY
Qty: 28 CAPSULE | Refills: 0 | Status: SHIPPED | OUTPATIENT
Start: 2022-11-01 | End: 2022-11-29

## 2022-11-01 RX ADMIN — CEPHALEXIN 500 MG: 250 CAPSULE ORAL at 19:06

## 2022-11-01 RX ADMIN — ONDANSETRON 4 MG: 2 INJECTION INTRAMUSCULAR; INTRAVENOUS at 14:25

## 2022-11-01 RX ADMIN — SODIUM CHLORIDE 1000 ML: 9 INJECTION, SOLUTION INTRAVENOUS at 14:25

## 2022-11-01 NOTE — ED PROVIDER NOTES
Time: 2:21 PM EDT  Arrived by: private car  Chief Complaint: abdominal pain  History provided by: pt  History is limited by: N/A     History of Present Illness:  Patient is a 42 y.o. year old female who presents to the emergency department with abdominal pain. Pt was seen at  today and was discharged. The  told the pt to come to the ED. Pt reports that she was having heart burn that turned into abdominal pain. She reports that she also had heart burn yesterday and took TUMS She also reports of N/V/D. Pt denies dysuria, however, her urine looks grossly abnormal and very cloudy. Pt reports that her pain is a little better now.    Pt has hx of ulcerative colitis, UTI    Pt has surgical hx of cervical polypectomy, colonoscopy, esophagogastroduodenoscopy, polypectomy     Pt has family hx of issues with gallbladder      History provided by:  Patient   used: No        Similar Symptoms Previously: yes  Recently seen: 11/1/22      Patient Care Team  Primary Care Provider: Corby Schaeffer PA    Past Medical History:     Allergies   Allergen Reactions   • Penicillins Unknown - High Severity     unknown   • Sulfa Antibiotics Rash and Other (See Comments)     .   • Latex Rash     Past Medical History:   Diagnosis Date   • Allergy, unspecified, initial encounter    • Anemia    • Ankle sprain    • Arthritis    • Arthritis of back 2002   • Arthritis of neck 2002   • Asthma    • Cervical disc disorder    • Colitis, ulcerative ([facility]) 2002   • Diabetes mellitus type 2 in nonobese ([facility]) 03/06/2018   • Fracture of wrist     2nd grade   • Fracture, finger 2018   • Fracture, foot    • Limb swelling    • Obesity 03/06/2018   • Pain in both knees 03/20/2018    UNSPECIFIED CHRONICITY   • Psoriasis 07/30/2019   • Reflux esophagitis    • Seasonal allergies    • Sinusitis, chronic    • Skin disorder    • SOB (shortness of breath)    • Tendonitis 03/20/2018    INSERTIONAL PATELLAR TENDONITIS, BILATERAL   • Thoracic disc  disorder    • Wrist sprain      Past Surgical History:   Procedure Laterality Date   • CERVICAL POLYPECTOMY  2001   • COLONOSCOPY  07/13/2018    REPEAT IN 3 YEARS ([facility])   • DIAGNOSTIC LAPAROSCOPY     • ENDOSCOPY  07/31/2018   • POLYPECTOMY  2002    STOMACH POLYP REMOVED   • WISDOM TOOTH EXTRACTION  1997     Family History   Problem Relation Age of Onset   • Diabetes Mother         UNSPECIFIED TYPE   • Arthritis Mother    • Osteoporosis Mother    • Anesthesia problems Mother         Pseudocholinesterase   • Broken bones Mother    • Stroke Sister    • Diabetes Sister         UNSPECIFIED TYPE   • Arthritis Sister    • Osteoporosis Sister    • Breast cancer Maternal Grandmother         MALIGNANT   • Heart disease Maternal Grandmother    • Cancer Maternal Grandmother         Breast   • Stroke Maternal Grandfather    • Heart disease Maternal Grandfather    • Breast cancer Paternal Grandmother         MALIGNANT   • Cancer Paternal Grandmother    • Cancer Paternal Grandfather         UNSPECIFIED   • COPD Other    • Emphysema Other    • Diabetes Other    • Diabetes Sister        Home Medications:  Prior to Admission medications    Medication Sig Start Date End Date Taking? Authorizing Provider   baclofen (LIORESAL) 10 MG tablet TAKE ONE TABLET BY MOUTH FOUR TIMES A DAY 8/30/22   Corby Schaeffer PA   busPIRone (BUSPAR) 10 MG tablet TAKE ONE TABLET BY MOUTH THREE TIMES A DAY 9/12/22   Corby Schaeffer PA   citalopram (CeleXA) 20 MG tablet TAKE ONE TABLET BY MOUTH EVERY DAY 9/12/22   Corby Schaeffer PA   Continuous Blood Gluc Sensor (FreeStyle Terrie 14 Day Sensor) misc 1 each Every 14 (Fourteen) Days. 8/1/22   Corby Schaeffer PA   cyclobenzaprine (FLEXERIL) 10 MG tablet TAKE 1 TABLET BY MOUTH 3 (THREE) TIMES A DAY AS NEEDED FOR MUSCLE SPASMS. 10/5/22   Corby Schaeffer PA   ergocalciferol (ERGOCALCIFEROL) 1.25 MG (94910 UT) capsule Take 1 capsule by mouth 1 (One) Time Per Week. 4/1/22   Corby Schaeffer PA   fluticasone (FLONASE) 50 MCG/ACT  nasal spray SPRAY 2 SPRAYS IN EACH NOSTRIL ONCE DAILY IF NEEDED 10/5/22   Corby Schaeffer PA   glucose blood (FREESTYLE LITE) test strip USE AS DIRECTED TO CHECK BLOOD SUGAR TWO TIMES A DAY 2/15/22   Corby Schaeffer PA   ibuprofen (ADVIL,MOTRIN) 800 MG tablet Take 1 tablet by mouth Every 6 (Six) Hours As Needed for Mild Pain. 10/2/22   Esteban Elaine APRN   Jardiance 10 MG tablet tablet TAKE 1 TABLET (10 MG) BY ORAL ROUTE ONCE DAILY IN THE MORNING 7/7/22   Corby Schaeffer PA   ketoconazole (NIZORAL) 2 % shampoo APPLY TOPICALLY TO SCALP TWICE WEEKLY AS DIRECTED WAITING AT LEAST 3 DAYS BETWEEN SHAMPOOING 7/7/22   Corby Schaeffer PA   Lancets (freestyle) lancets USE TO TEST BLOOD SUGAR TWO TIMES A DAY 1/17/22   Corby Schaeffer PA   Lancets (freestyle) lancets USE TO TEST BLOOD SUGAR TWO TIMES A DAY 9/22/22   Provider, MD [place]   loratadine (CLARITIN) 10 MG tablet Claritin 10 mg oral tablet take 1 tablet (10 mg) by oral route once daily   Active    Provider, MD [place]   montelukast (SINGULAIR) 10 MG tablet TAKE 1 TABLET (10 MG) BY ORAL ROUTE ONCE DAILY IN THE EVENING 6/6/22   Nia Young APRN   norgestimate-ethinyl estradiol (ORTHO TRI-CYCLEN,TRINESSA) 0.18/0.215/0.25 MG-35 MCG per tablet TAKE 1 TABLET BY ORAL ROUTE ONCE DAILY 2/16/22   Nia Young APRN   Omeprazole (PRILOSEC PO) Take 1 tablet by mouth Daily As Needed.    Emergency, Nurse Epic, RN   Ozempic, 1 MG/DOSE, 4 MG/3ML solution pen-injector INJECT 1 MG SUB-Q ONCE WEEKLY ON THE SAME DAY OF EACH WEEK INTO THE ABDOMEN, THIGHS, OR UPPER ARM ROTATING INJECTION SITES 8/4/22   Corby Schaeffer PA   ProAir  (90 Base) MCG/ACT inhaler INHALE 1-2 PUFFS BY MOUTH EVERY 6 HOURS IF NEEDED 4/13/22   Corby Schaeffer PA        Social History:   Social History     Tobacco Use   • Smoking status: Former     Packs/day: 0.50     Years: 10.00     Pack years: 5.00     Types: Cigarettes   • Smokeless tobacco: Never   • Tobacco comments:     Quit smoking over  "9 years ago   Vaping Use   • Vaping Use: Never used   Substance Use Topics   • Alcohol use: Never   • Drug use: Never     Recent travel: not applicable     Review of Systems:  Review of Systems   Constitutional: Negative for chills and fever.   HENT: Negative for sore throat.    Eyes: Negative for photophobia.   Respiratory: Negative for shortness of breath.    Cardiovascular: Negative for chest pain.   Gastrointestinal: Positive for abdominal pain, diarrhea, nausea and vomiting.   Genitourinary: Negative for dysuria.   Musculoskeletal: Negative for neck pain.   Skin: Negative for wound.   Neurological: Negative for headaches.   All other systems reviewed and are negative.       Physical Exam:  /70   Pulse 88   Temp 98.3 °F (36.8 °C) (Oral)   Resp 18   Ht 160 cm (63\")   Wt 110 kg (242 lb 8.1 oz)   SpO2 96%   BMI 42.96 kg/m²     Physical Exam  Vitals and nursing note reviewed.   Constitutional:       General: She is not in acute distress.  HENT:      Head: Normocephalic and atraumatic.   Eyes:      Extraocular Movements: Extraocular movements intact.   Cardiovascular:      Rate and Rhythm: Normal rate and regular rhythm.   Pulmonary:      Effort: Pulmonary effort is normal. No respiratory distress.      Breath sounds: Normal breath sounds.   Abdominal:      General: Abdomen is flat. Bowel sounds are decreased.      Palpations: Abdomen is soft.      Tenderness: There is abdominal tenderness in the right upper quadrant and epigastric area.   Genitourinary:     Comments:    Musculoskeletal:         General: Normal range of motion.      Cervical back: Normal range of motion and neck supple.      Right lower leg: No edema.      Left lower leg: No edema.   Feet:      Comments: No pedal edema  Skin:     General: Skin is warm and dry.      Capillary Refill: Capillary refill takes less than 2 seconds.   Neurological:      Mental Status: She is alert and oriented to person, place, and time. Mental status is at " baseline.                Medications in the Emergency Department:  Medications   ondansetron (ZOFRAN) injection 4 mg (4 mg Intravenous Given 11/1/22 1425)   sodium chloride 0.9 % bolus 1,000 mL (0 mL Intravenous Stopped 11/1/22 1430)   cephalexin (KEFLEX) capsule 500 mg (500 mg Oral Given 11/1/22 1906)        Labs  Lab Results (last 24 hours)     ** No results found for the last 24 hours. **           Imaging:  No Radiology Exams Resulted Within Past 24 Hours    Procedures:  Procedures    Progress                            Medical Decision Making:  MDM     Final diagnoses:   Acute urinary tract infection   Abdominal pain of unknown cause        Disposition:  ED Disposition     ED Disposition   Discharge    Condition   Stable    Comment   --             This medical record created using voice recognition software.        Documentation assistance provided by Elmira Cole acting as scribe for No att. providers found. Information recorded by the scribe was done at my direction and has been verified and validated by me.          Elmira Cole  11/01/22 1625       Lewis Gonzalez DO  11/04/22 0676

## 2022-11-02 ENCOUNTER — TELEPHONE (OUTPATIENT)
Dept: CASE MANAGEMENT | Facility: OTHER | Age: 42
End: 2022-11-02

## 2022-11-02 NOTE — TELEPHONE ENCOUNTER
Patient identified as potential CCM/HRCM candidate. Spoke with patient about program and states she doesn't have any concerns with her health at this time that requires the program.   Patient picked up her abx. For UTI that was diagnosed in ER yesterday. Advised to call office if she doesn't get better once abd completed.    Genia Sanchez RN  Ambulatory Case Management    11/2/2022, 17:31 EDT

## 2022-11-09 ENCOUNTER — OFFICE VISIT (OUTPATIENT)
Dept: ORTHOPEDIC SURGERY | Facility: CLINIC | Age: 42
End: 2022-11-09

## 2022-11-09 VITALS — HEIGHT: 63 IN | WEIGHT: 242 LBS | BODY MASS INDEX: 42.88 KG/M2

## 2022-11-09 DIAGNOSIS — S92.354D CLOSED NONDISPLACED FRACTURE OF FIFTH METATARSAL BONE OF RIGHT FOOT WITH ROUTINE HEALING, SUBSEQUENT ENCOUNTER: Primary | ICD-10-CM

## 2022-11-09 PROCEDURE — 99213 OFFICE O/P EST LOW 20 MIN: CPT | Performed by: PHYSICIAN ASSISTANT

## 2022-11-09 RX ORDER — MELOXICAM 7.5 MG/1
7.5 TABLET ORAL DAILY
COMMUNITY
Start: 2022-10-31 | End: 2023-01-25 | Stop reason: SDUPTHER

## 2022-11-09 NOTE — PROGRESS NOTES
"Chief Complaint  Follow-up of the Right Foot    Subjective          Mary Go presents to [facility]   History of Present Illness    Mary Go presents today for a follow-up of her right foot.  Patient has a right fifth metatarsal fracture that we have been treating nonoperatively with initial injury 10/2/2022.  Today, patient states she is doing well.  She has continued with her fracture boot.  She states that her foot does feel better and she has been ambulating some on the fracture boot.  She reports good range of motion.  She denies numbness or tingling.  Denies swelling.  She reports soreness after standing or walking for an extended period of time.  She reports no new injuries.  Denies numbness or tingling.      Allergies   Allergen Reactions   • Penicillins Unknown - High Severity     unknown   • Sulfa Antibiotics Rash and Other (See Comments)     .   • Latex Rash        Social History     Socioeconomic History   • Marital status: Single   Tobacco Use   • Smoking status: Former     Packs/day: 0.50     Years: 10.00     Pack years: 5.00     Types: Cigarettes   • Smokeless tobacco: Never   • Tobacco comments:     Quit smoking over 9 years ago   Vaping Use   • Vaping Use: Never used   Substance and Sexual Activity   • Alcohol use: Never   • Drug use: Never   • Sexual activity: Not Currently     Partners: Male     Birth control/protection: Condom, Pill        I reviewed the patient's chief complaint, history of present illness, review of systems, past medical history, surgical history, family history, social history, medications, and allergy list.     REVIEW OF SYSTEMS    Constitutional: Denies fevers, chills, weight loss  Cardiovascular: Denies chest pain, shortness of breath  Skin: Denies rashes, acute skin changes  Neurologic: Denies headache, loss of consciousness  MSK: Right foot pain      Objective   Vital Signs:   Ht 160 cm (63\")   Wt 110 kg (242 lb)   BMI 42.87 " kg/m²     Body mass index is 42.87 kg/m².    Physical Exam    General: Alert. No acute distress.   Right lower extremity: Nontender to the fifth metatarsal region.  No other areas of tenderness to the foot.  Calf soft, nontender.  Achilles intact.  Ankle stable ligamentous stress.  Nontender to the ankle.  Demonstrates active ankle plantarflexion dorsiflexion.  Dorsiflexion 5.  Plantarflexion 30.  Toe range of motion intact.  Sensation intact over the dorsal and plantar foot.  Palpable pedal pulses.  No pain with inversion or eversion testing.    Procedures    Imaging Results (Most Recent)     Procedure Component Value Units Date/Time    XR Foot 3+ View Right [[id number]] Resulted: 11/09/22 1305     Updated: 11/09/22 1306    Narrative:      Indications: Follow-up right fifth metatarsal fracture    Views: AP, oblique, lateral right foot    Findings: Right fifth metatarsal fracture is seen with stable alignment.    Slight increase in callus formation compared to the previous films.  All   joints are well aligned.  No additional fractures.    Comparative Data: Comparative data found and reviewed today                   Assessment and Plan    Diagnoses and all orders for this visit:    1. Closed nondisplaced fracture of fifth metatarsal bone of right foot with routine healing, subsequent encounter (Primary)  -     XR Foot 3+ View Right        Mary Go presents today for follow-up of her right fifth metatarsal fracture with initial injury 10/2/2022.  X-rays reviewed with the patient today.  Patient structured to continue with a fracture boot at this time as her fracture continues to heal.  Continue with her home exercises working on range of motion.  Use ice and elevation as needed for inflammation.        Patient will follow up in 4 weeks for reevaluation.  We will obtain x-rays of right foot at next visit.      Call or return if symptoms worsen or patient has any concerns.       Follow Up   Return in about 4 weeks  (around 12/7/2022).  Patient was given instructions and counseling regarding her condition or for health maintenance advice. Please see specific information pulled into the AVS if appropriate.     Renee Alan PA-C  11/09/22  13:08 EST

## 2022-11-29 ENCOUNTER — OFFICE VISIT (OUTPATIENT)
Dept: FAMILY MEDICINE CLINIC | Facility: CLINIC | Age: 42
End: 2022-11-29

## 2022-11-29 VITALS
DIASTOLIC BLOOD PRESSURE: 58 MMHG | HEART RATE: 85 BPM | SYSTOLIC BLOOD PRESSURE: 108 MMHG | HEIGHT: 63 IN | OXYGEN SATURATION: 98 % | BODY MASS INDEX: 44.3 KG/M2 | WEIGHT: 250 LBS

## 2022-11-29 DIAGNOSIS — F41.9 ANXIETY: ICD-10-CM

## 2022-11-29 DIAGNOSIS — E66.01 CLASS 3 SEVERE OBESITY DUE TO EXCESS CALORIES WITH SERIOUS COMORBIDITY AND BODY MASS INDEX (BMI) OF 40.0 TO 44.9 IN ADULT: ICD-10-CM

## 2022-11-29 DIAGNOSIS — E11.65 TYPE 2 DIABETES MELLITUS WITH HYPERGLYCEMIA, WITHOUT LONG-TERM CURRENT USE OF INSULIN: Primary | ICD-10-CM

## 2022-11-29 DIAGNOSIS — Z13.29 SCREENING FOR THYROID DISORDER: ICD-10-CM

## 2022-11-29 DIAGNOSIS — N39.0 URINARY TRACT INFECTION WITHOUT HEMATURIA, SITE UNSPECIFIED: ICD-10-CM

## 2022-11-29 DIAGNOSIS — E55.9 VITAMIN D DEFICIENCY: ICD-10-CM

## 2022-11-29 DIAGNOSIS — Z13.220 SCREENING FOR LIPID DISORDERS: ICD-10-CM

## 2022-11-29 LAB
BILIRUB BLD-MCNC: NEGATIVE MG/DL
CLARITY, POC: CLEAR
COLOR UR: YELLOW
EXPIRATION DATE: ABNORMAL
GLUCOSE UR STRIP-MCNC: ABNORMAL MG/DL
KETONES UR QL: NEGATIVE
LEUKOCYTE EST, POC: ABNORMAL
Lab: ABNORMAL
NITRITE UR-MCNC: NEGATIVE MG/ML
PH UR: 5.5 [PH] (ref 5–8)
PROT UR STRIP-MCNC: NEGATIVE MG/DL
RBC # UR STRIP: NEGATIVE /UL
SP GR UR: 1.02 (ref 1–1.03)
UROBILINOGEN UR QL: ABNORMAL

## 2022-11-29 PROCEDURE — 99214 OFFICE O/P EST MOD 30 MIN: CPT | Performed by: NURSE PRACTITIONER

## 2022-11-29 PROCEDURE — 87086 URINE CULTURE/COLONY COUNT: CPT | Performed by: NURSE PRACTITIONER

## 2022-11-29 RX ORDER — ERGOCALCIFEROL 1.25 MG/1
50000 CAPSULE ORAL WEEKLY
Qty: 13 CAPSULE | Refills: 1 | Status: SHIPPED | OUTPATIENT
Start: 2022-11-29 | End: 2022-12-14

## 2022-11-29 RX ORDER — MONTELUKAST SODIUM 10 MG/1
TABLET ORAL
Qty: 30 TABLET | Refills: 5 | Status: SHIPPED | OUTPATIENT
Start: 2022-11-29

## 2022-11-29 NOTE — PROGRESS NOTES
Chief Complaint  DM2, anxiety,       SUBJECTIVE  Mary Go presents to [facility]   Patient here to establish care with new provider, previously seeing Lore arambula.  Patient has a history of diabetes, vitamin D deficiency, anxiety.  Pt was seen at ER on 11/01/22 for UTI. Pt has completed Keflex but reports still having symptoms. States having some discomfort with urination.     Pt is diabetec, due for labs.   History of Present Illness  Past Medical History:   Diagnosis Date   • Allergic    • Allergy, unspecified, initial encounter    • Anemia    • Ankle sprain    • Arthritis    • Arthritis of back 2002   • Arthritis of neck 2002   • Asthma    • Cervical disc disorder    • Colitis, ulcerative ([facility]ly [facility]) 2002   • Diabetes mellitus type 2 in nonobese ([facility]ly [facility]) 03/06/2018   • Fracture of wrist     2nd grade   • Fracture, finger 2018   • Fracture, foot    • Limb swelling    • Obesity 03/06/2018   • Pain in both knees 03/20/2018    UNSPECIFIED CHRONICITY   • Psoriasis 07/30/2019   • Reflux esophagitis    • Seasonal allergies    • Sinusitis, chronic    • Skin disorder    • SOB (shortness of breath)    • Tendonitis 03/20/2018    INSERTIONAL PATELLAR TENDONITIS, BILATERAL   • Thoracic disc disorder    • Wrist sprain       Family History   Problem Relation Age of Onset   • Diabetes Mother         UNSPECIFIED TYPE   • Arthritis Mother    • Osteoporosis Mother    • Anesthesia problems Mother         Pseudocholinesterase   • Broken bones Mother    • Stroke Sister    • Diabetes Sister         UNSPECIFIED TYPE   • Arthritis Sister    • Osteoporosis Sister    • Breast cancer Maternal Grandmother         MALIGNANT   • Heart disease Maternal Grandmother    • Cancer Maternal Grandmother         Breast   • Stroke Maternal Grandfather    • Heart disease Maternal Grandfather    • Breast cancer Paternal Grandmother         MALIGNANT   • Cancer Paternal Grandmother    • Cancer Paternal Grandfather          UNSPECIFIED   • COPD Other    • Emphysema Other    • Diabetes Other    • Diabetes Sister       Past Surgical History:   Procedure Laterality Date   • CERVICAL POLYPECTOMY  2001   • COLONOSCOPY  07/13/2018    REPEAT IN 3 YEARS (ELIZABETH)   • DIAGNOSTIC LAPAROSCOPY     • ENDOSCOPY  07/31/2018   • POLYPECTOMY  2002    STOMACH POLYP REMOVED   • WISDOM TOOTH EXTRACTION  1997        Current Outpatient Medications:   •  baclofen (LIORESAL) 10 MG tablet, TAKE ONE TABLET BY MOUTH FOUR TIMES A DAY, Disp: 40 tablet, Rfl: 2  •  busPIRone (BUSPAR) 10 MG tablet, TAKE ONE TABLET BY MOUTH THREE TIMES A DAY, Disp: 60 tablet, Rfl: 5  •  citalopram (CeleXA) 20 MG tablet, TAKE ONE TABLET BY MOUTH EVERY DAY, Disp: 30 tablet, Rfl: 5  •  Continuous Blood Gluc Sensor (FreeStyle Terrie 14 Day Sensor) [facility], 1 each Every 14 (Fourteen) Days., Disp: 2 each, Rfl: 11  •  cyclobenzaprine (FLEXERIL) 10 MG tablet, TAKE 1 TABLET BY MOUTH 3 (THREE) TIMES A DAY AS NEEDED FOR MUSCLE SPASMS., Disp: 60 tablet, Rfl: 0  •  empagliflozin (Jardiance) 10 MG tablet tablet, Take 1 tablet by mouth Daily., Disp: 90 tablet, Rfl: 1  •  ergocalciferol (ERGOCALCIFEROL) 1.25 MG (82889 UT) capsule, Take 1 capsule by mouth 1 (One) Time Per Week., Disp: 13 capsule, Rfl: 1  •  fluticasone (FLONASE) 50 MCG/ACT nasal spray, SPRAY 2 SPRAYS IN EACH NOSTRIL ONCE DAILY IF NEEDED, Disp: 16 g, Rfl: 0  •  glucose blood (FREESTYLE LITE) test strip, USE AS DIRECTED TO CHECK BLOOD SUGAR TWO TIMES A DAY, Disp: 100 each, Rfl: 5  •  ibuprofen (ADVIL,MOTRIN) 800 MG tablet, Take 1 tablet by mouth Every 6 (Six) Hours As Needed for Mild Pain., Disp: 30 tablet, Rfl: 0  •  ketoconazole (NIZORAL) 2 % shampoo, APPLY TOPICALLY TO SCALP TWICE WEEKLY AS DIRECTED WAITING AT LEAST 3 DAYS BETWEEN SHAMPOOING, Disp: 120 mL, Rfl: 2  •  Lancets (freestyle) lancets, USE TO TEST BLOOD SUGAR TWO TIMES A DAY, Disp: 100 each, Rfl: 5  •  loratadine (CLARITIN) 10 MG tablet, Claritin 10 mg oral tablet take 1 tablet  "(10 mg) by oral route once daily   Active, Disp: , Rfl:   •  meloxicam (MOBIC) 7.5 MG tablet, Take 1 tablet by mouth Daily., Disp: , Rfl:   •  montelukast (SINGULAIR) 10 MG tablet, TAKE ONE TABLET BY MOUTH EVERY EVENING, Disp: 30 tablet, Rfl: 5  •  norgestimate-ethinyl estradiol (ORTHO TRI-CYCLEN,TRINESSA) 0.18/0.215/0.25 MG-35 MCG per tablet, TAKE 1 TABLET BY ORAL ROUTE ONCE DAILY, Disp: 28 tablet, Rfl: 11  •  Omeprazole (PRILOSEC PO), Take 1 tablet by mouth Daily As Needed., Disp: , Rfl:   •  ondansetron ODT (ZOFRAN-ODT) 4 MG disintegrating tablet, Place 1 tablet on the tongue Every 8 (Eight) Hours As Needed for Nausea or Vomiting., Disp: 12 tablet, Rfl: 0  •  Ozempic, 1 MG/DOSE, 4 MG/3ML solution pen-injector, INJECT 1 MG SUB-Q ONCE WEEKLY ON THE SAME DAY OF EACH WEEK INTO THE ABDOMEN, THIGHS, OR UPPER ARM ROTATING INJECTION SITES, Disp: 3 mL, Rfl: 6  •  ProAir  (90 Base) MCG/ACT inhaler, INHALE 1-2 PUFFS BY MOUTH EVERY 6 HOURS IF NEEDED, Disp: 8.5 g, Rfl: 5    OBJECTIVE  Vital Signs:   /58   Pulse 85   Ht 160 cm (63\")   Wt 113 kg (250 lb)   SpO2 98%   BMI 44.29 kg/m²    Estimated body mass index is 44.29 kg/m² as calculated from the following:    Height as of this encounter: 160 cm (63\").    Weight as of this encounter: 113 kg (250 lb).     Wt Readings from Last 3 Encounters:   11/29/22 113 kg (250 lb)   11/09/22 110 kg (242 lb)   11/01/22 110 kg (242 lb 8.1 oz)     BP Readings from Last 3 Encounters:   11/29/22 108/58   11/01/22 112/70   11/01/22 122/75       Physical Exam  Vitals reviewed.   Constitutional:       Appearance: Normal appearance. She is well-developed.   HENT:      Head: Normocephalic and atraumatic.      Right Ear: External ear normal.      Left Ear: External ear normal.   Eyes:      Conjunctiva/sclera: Conjunctivae normal.      Pupils: Pupils are equal, round, and reactive to light.   Cardiovascular:      Rate and Rhythm: Normal rate and regular rhythm.      Heart sounds: No " murmur heard.    No friction rub. No gallop.   Pulmonary:      Effort: Pulmonary effort is normal.      Breath sounds: Normal breath sounds. No wheezing or rhonchi.   Skin:     General: Skin is warm and dry.   Neurological:      Mental Status: She is alert and oriented to person, place, and time.      Cranial Nerves: No cranial nerve deficit.   Psychiatric:         Mood and Affect: Mood and affect normal.         Behavior: Behavior normal.         Thought Content: Thought content normal.         Judgment: Judgment normal.          Result Review    CMP    CMP 3/31/22 11/1/22   Glucose 133 (A) 124 (A)   BUN 9 9   Creatinine 0.76 0.81   Sodium 136 137   Potassium 4.3 4.1   Chloride 104 102   Calcium 9.2 9.4   Albumin 4.20 4.20   Total Bilirubin 0.3 0.8   Alkaline Phosphatase 67 89   AST (SGOT) 21 16   ALT (SGPT) 26 14   (A) Abnormal value       Comments are available for some flowsheets but are not being displayed.           CBC    CBC 3/31/22 11/1/22   WBC 9.43 9.68   RBC 4.35 4.58   Hemoglobin 13.9 14.7   Hematocrit 41.0 41.5   MCV 94.3 90.6   MCH 32.0 32.1   MCHC 33.9 35.4   RDW 12.1 (A) 11.9 (A)   Platelets 321 293   (A) Abnormal value            Lipid Panel    Lipid Panel 3/31/22   Total Cholesterol 156   Triglycerides 106   HDL Cholesterol 44   VLDL Cholesterol 19   LDL Cholesterol  93   LDL/HDL Ratio 2.06           TSH    TSH 3/31/22   TSH 1.970           Most Recent A1C    HGBA1C Most Recent 3/31/22   Hemoglobin A1C 6.70 (A)   (A) Abnormal value              XR Foot 2 View Right    Result Date: 10/2/2022    1. Oblique fracture of the 5th metatarsal. 2. Mild medial displacement of the distal 5th metatarsal.      JACQUIE POSADA MD       Electronically Signed and Approved By: JACQUIE POSADA MD on 10/02/2022 at 15:01             US Gallbladder    Result Date: 11/1/2022    1. Hepatic steatosis     Eder Gao M.D.       Electronically Signed and Approved By: Eder Gao M.D. on 11/01/2022 at 17:42              Mammo Screening Digital Tomosynthesis Bilateral With CAD    Result Date: 8/9/2022   Benign mammogram. Suggest routine mammographic screening.  RECOMMENDATION(S):  ROUTINE MAMMOGRAM AND CLINICAL EVALUATION IN 12 MONTHS.   BIRADS:  DIAGNOSTIC CATEGORY 1--NEGATIVE.   BREAST COMPOSITION: Almost entirely fatty.  PLEASE NOTE:  A NORMAL MAMMOGRAM DOES NOT EXCLUDE THE POSSIBILITY OF BREAST CANCER. ANY CLINICALLY SUSPICIOUS PALPABLE LUMP SHOULD BE BIOPSIED.      STEVO MURPHY MD       Electronically Signed and Approved By: STEVO MURPHY MD on 8/09/2022 at 10:23                The above data has been reviewed by NIKOLE Odom 11/29/2022 14:41 EST.          Patient Care Team:  Nicolle Mcmahon APRN as PCP - General (Nurse Practitioner)    Class 3 Severe Obesity (BMI >=40). Obesity-related health conditions include the following: diabetes mellitus. Obesity is unchanged. BMI is is above average; BMI management plan is completed. We discussed portion control and increasing exercise.       ASSESSMENT & PLAN    Diagnoses and all orders for this visit:    1. Type 2 diabetes mellitus with hyperglycemia, without long-term current use of insulin (HCC) (Primary)  Assessment & Plan:  Well controlled on last lasb, cont current meds and recheck labs today     Orders:  -     Hemoglobin A1c; Future  -     empagliflozin (Jardiance) 10 MG tablet tablet; Take 1 tablet by mouth Daily.  Dispense: 90 tablet; Refill: 1  -     Microalbumin / Creatinine Urine Ratio - Urine, Clean Catch; Future    2. Vitamin D deficiency  Overview:  Well controlled with Vit D supplementation , cont current dose     Orders:  -     ergocalciferol (ERGOCALCIFEROL) 1.25 MG (57517 UT) capsule; Take 1 capsule by mouth 1 (One) Time Per Week.  Dispense: 13 capsule; Refill: 1  -     Vitamin D 25 hydroxy; Future    3. Screening for thyroid disorder  -     TSH; Future    4. Screening for lipid disorders  -     CBC w AUTO Differential; Future  -      Comprehensive metabolic panel; Future  -     Lipid panel; Future    5. Urinary tract infection without hematuria, site unspecified  -     POCT urinalysis dipstick, automated  -     Urine Culture - Urine, Urine, Clean Catch; Future  -     Urine Culture - Urine, Urine, Clean Catch    6. Class 3 severe obesity due to excess calories with serious comorbidity and body mass index (BMI) of 40.0 to 44.9 in adult (HCC)  Overview:  Cont to work on diet and exercise       7. Anxiety  Overview:  Well-controlled BuSpar and Celexa, continue current dose.         Tobacco Use: Medium Risk   • Smoking Tobacco Use: Former   • Smokeless Tobacco Use: Never   • Passive Exposure: Not on file       Follow Up     Return in about 3 months (around 2/28/2023).        Patient was given instructions and counseling regarding her condition or for health maintenance advice. Please see specific information pulled into the AVS if appropriate.   I have reviewed information obtained and documented by others and I have confirmed the accuracy of this documented note.    NIKOLE Odom

## 2022-11-30 LAB — BACTERIA SPEC AEROBE CULT: NO GROWTH

## 2022-12-07 ENCOUNTER — OFFICE VISIT (OUTPATIENT)
Dept: ORTHOPEDIC SURGERY | Facility: CLINIC | Age: 42
End: 2022-12-07

## 2022-12-07 VITALS — BODY MASS INDEX: 44.3 KG/M2 | HEIGHT: 63 IN | WEIGHT: 250 LBS

## 2022-12-07 DIAGNOSIS — M79.671 RIGHT FOOT PAIN: ICD-10-CM

## 2022-12-07 DIAGNOSIS — S92.354D CLOSED NONDISPLACED FRACTURE OF FIFTH METATARSAL BONE OF RIGHT FOOT WITH ROUTINE HEALING, SUBSEQUENT ENCOUNTER: Primary | ICD-10-CM

## 2022-12-07 PROCEDURE — 99213 OFFICE O/P EST LOW 20 MIN: CPT | Performed by: PHYSICIAN ASSISTANT

## 2022-12-07 NOTE — PROGRESS NOTES
"Chief Complaint  Follow-up of the Right Foot    Subjective          Mary Go presents to [facility]   History of Present Illness    Mary Go presents today for a follow-up of her right foot.  Patient has a right foot metatarsal fracture with initial injury 10/2/2022 that we are treating nonoperatively.  Today, patient states she is doing well.  She explains that her right foot pain has improved.  She is continue ambulating in her fracture boot the majority of the time without complications.  She states that her swelling has improved.  Denies numbness or tingling.  Denies new injuries.      Allergies   Allergen Reactions   • Penicillins Unknown - High Severity     unknown   • Sulfa Antibiotics Rash and Other (See Comments)     .   • Latex Rash        Social History     Socioeconomic History   • Marital status: Single   Tobacco Use   • Smoking status: Former     Packs/day: 0.50     Years: 10.00     Pack years: 5.00     Types: Cigarettes   • Smokeless tobacco: Never   • Tobacco comments:     Quit smoking over 9 years ago   Vaping Use   • Vaping Use: Never used   Substance and Sexual Activity   • Alcohol use: Never   • Drug use: Never   • Sexual activity: Not Currently     Partners: Male     Birth control/protection: Condom, Pill        I reviewed the patient's chief complaint, history of present illness, review of systems, past medical history, surgical history, family history, social history, medications, and allergy list.     REVIEW OF SYSTEMS    Constitutional: Denies fevers, chills, weight loss  Cardiovascular: Denies chest pain, shortness of breath  Skin: Denies rashes, acute skin changes  Neurologic: Denies headache, loss of consciousness  MSK: Right foot pain      Objective   Vital Signs:   Ht 160 cm (63\")   Wt 113 kg (250 lb)   BMI 44.29 kg/m²     Body mass index is 44.29 kg/m².    Physical Exam    General: Alert. No acute distress.   Right lower extremity: Nontender " to the fifth metatarsal.  Nontender to the dorsal and plantar foot.  Calf soft, nontender.  Nontender to the medial or lateral malleolus.  Achilles intact.  Ankle stable ligamentous stress.  Demonstrates active ankle plantarflexion and dorsiflexion.  Toe range of motion intact.  Sensation intact over the dorsal and plantar foot.  Palpable pedal pulses.    Procedures    Imaging Results (Most Recent)     Procedure Component Value Units Date/Time    XR Foot 3+ View Right [[id number]] Resulted: 12/07/22 1209     Updated: 12/07/22 1210    Narrative:      Indications: Follow-up right fifth metatarsal fracture    Views: AP, oblique, lateral right foot    Findings: Right fifth metatarsal shaft fracture is seen.  Increase in   callus formation about the fracture lines.  Fracture alignment is stable.    All joints are well aligned.  No additional fractures    Comparative Data: Comparative data found and reviewed today                   Assessment and Plan    Diagnoses and all orders for this visit:    1. Closed nondisplaced fracture of fifth metatarsal bone of right foot with routine healing, subsequent encounter (Primary)    2. Right foot pain  -     XR Foot 3+ View Right        Mary Go presents today for follow-up of her right foot metatarsal fracture with initial injury 10/2/2022 that were treated nonoperatively.  X-rays reviewed with the patient today.  Patient directed to continue with her fracture boot for ambulation.  We discussed applying pressure through her heel when she does have to apply pressure to her right foot without the boot.  Patient expressed understanding.  Use ice elevation as needed.  Continue with home exercises.  We will likely transition out of the fracture boot at next visit.      Patient will follow up in 3 weeks for reevaluation.  We will obtain new x-rays of the right foot at next visit.      Call or return if symptoms worsen or patient has any concerns.       Follow Up   Return in about 3  weeks (around 12/28/2022).  Patient was given instructions and counseling regarding her condition or for health maintenance advice. Please see specific information pulled into the AVS if appropriate.     Renee Alan PA-C  12/07/22  12:51 EST

## 2022-12-13 ENCOUNTER — LAB (OUTPATIENT)
Dept: LAB | Facility: HOSPITAL | Age: 42
End: 2022-12-13

## 2022-12-13 DIAGNOSIS — Z13.29 SCREENING FOR THYROID DISORDER: ICD-10-CM

## 2022-12-13 DIAGNOSIS — Z13.220 SCREENING FOR LIPID DISORDERS: ICD-10-CM

## 2022-12-13 DIAGNOSIS — E55.9 VITAMIN D DEFICIENCY: ICD-10-CM

## 2022-12-13 DIAGNOSIS — E11.65 TYPE 2 DIABETES MELLITUS WITH HYPERGLYCEMIA, WITHOUT LONG-TERM CURRENT USE OF INSULIN: ICD-10-CM

## 2022-12-13 LAB
ALBUMIN SERPL-MCNC: 4.1 G/DL (ref 3.5–5.2)
ALBUMIN UR-MCNC: <1.2 MG/DL
ALBUMIN/GLOB SERPL: 2.3 G/DL
ALP SERPL-CCNC: 77 U/L (ref 39–117)
ALT SERPL W P-5'-P-CCNC: 22 U/L (ref 1–33)
ANION GAP SERPL CALCULATED.3IONS-SCNC: 9 MMOL/L (ref 5–15)
AST SERPL-CCNC: 17 U/L (ref 1–32)
BASOPHILS # BLD AUTO: 0.06 10*3/MM3 (ref 0–0.2)
BASOPHILS NFR BLD AUTO: 0.5 % (ref 0–1.5)
BILIRUB SERPL-MCNC: 0.4 MG/DL (ref 0–1.2)
BUN SERPL-MCNC: 8 MG/DL (ref 6–20)
BUN/CREAT SERPL: 10.3 (ref 7–25)
CALCIUM SPEC-SCNC: 8.9 MG/DL (ref 8.6–10.5)
CHLORIDE SERPL-SCNC: 103 MMOL/L (ref 98–107)
CHOLEST SERPL-MCNC: 159 MG/DL (ref 0–200)
CO2 SERPL-SCNC: 24 MMOL/L (ref 22–29)
CREAT SERPL-MCNC: 0.78 MG/DL (ref 0.57–1)
CREAT UR-MCNC: 67.3 MG/DL
DEPRECATED RDW RBC AUTO: 42 FL (ref 37–54)
EGFRCR SERPLBLD CKD-EPI 2021: 96.8 ML/MIN/1.73
EOSINOPHIL # BLD AUTO: 0.13 10*3/MM3 (ref 0–0.4)
EOSINOPHIL NFR BLD AUTO: 1.2 % (ref 0.3–6.2)
ERYTHROCYTE [DISTWIDTH] IN BLOOD BY AUTOMATED COUNT: 12.3 % (ref 12.3–15.4)
GLOBULIN UR ELPH-MCNC: 1.8 GM/DL
GLUCOSE SERPL-MCNC: 115 MG/DL (ref 65–99)
HBA1C MFR BLD: 6.9 % (ref 4.8–5.6)
HCT VFR BLD AUTO: 41.6 % (ref 34–46.6)
HDLC SERPL-MCNC: 57 MG/DL (ref 40–60)
HGB BLD-MCNC: 14.2 G/DL (ref 12–15.9)
IMM GRANULOCYTES # BLD AUTO: 0.04 10*3/MM3 (ref 0–0.05)
IMM GRANULOCYTES NFR BLD AUTO: 0.4 % (ref 0–0.5)
LDLC SERPL CALC-MCNC: 85 MG/DL (ref 0–100)
LDLC/HDLC SERPL: 1.46 {RATIO}
LYMPHOCYTES # BLD AUTO: 4.35 10*3/MM3 (ref 0.7–3.1)
LYMPHOCYTES NFR BLD AUTO: 39 % (ref 19.6–45.3)
MCH RBC QN AUTO: 31.6 PG (ref 26.6–33)
MCHC RBC AUTO-ENTMCNC: 34.1 G/DL (ref 31.5–35.7)
MCV RBC AUTO: 92.7 FL (ref 79–97)
MICROALBUMIN/CREAT UR: NORMAL MG/G{CREAT}
MONOCYTES # BLD AUTO: 0.69 10*3/MM3 (ref 0.1–0.9)
MONOCYTES NFR BLD AUTO: 6.2 % (ref 5–12)
NEUTROPHILS NFR BLD AUTO: 5.87 10*3/MM3 (ref 1.7–7)
NEUTROPHILS NFR BLD AUTO: 52.7 % (ref 42.7–76)
NRBC BLD AUTO-RTO: 0 /100 WBC (ref 0–0.2)
PLATELET # BLD AUTO: 369 10*3/MM3 (ref 140–450)
PMV BLD AUTO: 9.6 FL (ref 6–12)
POTASSIUM SERPL-SCNC: 4.2 MMOL/L (ref 3.5–5.2)
PROT SERPL-MCNC: 5.9 G/DL (ref 6–8.5)
RBC # BLD AUTO: 4.49 10*6/MM3 (ref 3.77–5.28)
SODIUM SERPL-SCNC: 136 MMOL/L (ref 136–145)
TRIGL SERPL-MCNC: 94 MG/DL (ref 0–150)
TSH SERPL DL<=0.05 MIU/L-ACNC: 2.17 UIU/ML (ref 0.27–4.2)
VLDLC SERPL-MCNC: 17 MG/DL (ref 5–40)
WBC NRBC COR # BLD: 11.14 10*3/MM3 (ref 3.4–10.8)

## 2022-12-13 PROCEDURE — 82043 UR ALBUMIN QUANTITATIVE: CPT

## 2022-12-13 PROCEDURE — 82570 ASSAY OF URINE CREATININE: CPT

## 2022-12-13 PROCEDURE — 83036 HEMOGLOBIN GLYCOSYLATED A1C: CPT

## 2022-12-13 PROCEDURE — 80061 LIPID PANEL: CPT

## 2022-12-13 PROCEDURE — 36415 COLL VENOUS BLD VENIPUNCTURE: CPT

## 2022-12-13 PROCEDURE — 82306 VITAMIN D 25 HYDROXY: CPT

## 2022-12-13 PROCEDURE — 80050 GENERAL HEALTH PANEL: CPT

## 2022-12-14 DIAGNOSIS — E55.9 VITAMIN D DEFICIENCY: ICD-10-CM

## 2022-12-14 DIAGNOSIS — E55.9 VITAMIN D DEFICIENCY: Primary | ICD-10-CM

## 2022-12-14 DIAGNOSIS — D72.829 LEUKOCYTOSIS, UNSPECIFIED TYPE: ICD-10-CM

## 2022-12-14 LAB — 25(OH)D3 SERPL-MCNC: 24.8 NG/ML (ref 30–100)

## 2022-12-14 RX ORDER — ERGOCALCIFEROL 1.25 MG/1
50000 CAPSULE ORAL 2 TIMES WEEKLY
Qty: 26 CAPSULE | Refills: 1 | Status: SHIPPED | OUTPATIENT
Start: 2022-12-15

## 2022-12-14 NOTE — TELEPHONE ENCOUNTER
Patient needs new, updated prescription for Vitamin D.  She was increased to twice weekly today per her lab results and only has 2 capsules left.

## 2022-12-28 ENCOUNTER — OFFICE VISIT (OUTPATIENT)
Dept: ORTHOPEDIC SURGERY | Facility: CLINIC | Age: 42
End: 2022-12-28

## 2022-12-28 VITALS — HEIGHT: 63 IN | BODY MASS INDEX: 44.58 KG/M2 | WEIGHT: 251.6 LBS | OXYGEN SATURATION: 99 % | HEART RATE: 115 BPM

## 2022-12-28 DIAGNOSIS — S92.354D CLOSED NONDISPLACED FRACTURE OF FIFTH METATARSAL BONE OF RIGHT FOOT WITH ROUTINE HEALING, SUBSEQUENT ENCOUNTER: Primary | ICD-10-CM

## 2022-12-28 DIAGNOSIS — M79.671 RIGHT FOOT PAIN: ICD-10-CM

## 2022-12-28 PROCEDURE — 99213 OFFICE O/P EST LOW 20 MIN: CPT | Performed by: PHYSICIAN ASSISTANT

## 2022-12-28 NOTE — PROGRESS NOTES
Chief Complaint  Follow-up of the Right Foot    Subjective          Mary Go presents to [facility]   History of Present Illness    Mary Go presents today for a follow-up of her right foot.  Patient is a right fifth metatarsal fracture with initial injury 10/2/2022 that we have been treating conservatively.  Today, patient states she is doing well.  She reports no pain in the majority of the time.  She does describe an ache with the colder temperatures.  She states that she has remained in her cam fracture boot with ambulation the majority of the time.  She has ambulated a couple steps without the boot and reports no increase in pain.  She denies swelling.  She states that she has good ankle and toe range of motion.  Denies new injuries.  Denies numbness or tingling.      Allergies   Allergen Reactions   • Penicillins Unknown - High Severity     unknown   • Sulfa Antibiotics Rash and Other (See Comments)     .   • Latex Rash        Social History     Socioeconomic History   • Marital status: Single   Tobacco Use   • Smoking status: Former     Packs/day: 0.50     Years: 10.00     Pack years: 5.00     Types: Cigarettes   • Smokeless tobacco: Never   • Tobacco comments:     Quit smoking over 9 years ago   Vaping Use   • Vaping Use: Never used   Substance and Sexual Activity   • Alcohol use: Never   • Drug use: Never   • Sexual activity: Not Currently     Partners: Male     Birth control/protection: Condom, Pill        I reviewed the patient's chief complaint, history of present illness, review of systems, past medical history, surgical history, family history, social history, medications, and allergy list.     REVIEW OF SYSTEMS    Constitutional: Denies fevers, chills, weight loss  Cardiovascular: Denies chest pain, shortness of breath  Skin: Denies rashes, acute skin changes  Neurologic: Denies headache, loss of consciousness  MSK: Right foot pain      Objective   Vital  "Signs:   Pulse 115   Ht 160 cm (63\")   Wt 114 kg (251 lb 9.6 oz)   SpO2 99%   BMI 44.57 kg/m²     Body mass index is 44.57 kg/m².    Physical Exam    General: Alert. No acute distress.   Right lower extremity: Slight tenderness to the fifth metatarsal region.  Nontender to the remaining foot.  Calf soft, nontender.  No pain with syndesmotic squeeze test.  Achilles intact.  Nontender to medial lateral malleolus.  Demonstrates active ankle plantarflexion and dorsiflexion with no associated pain.  Toe range of motion intact.  Demonstrates inversion and eversion testing.  Sensation intact over the dorsal and plantar foot.  Palpable pedal pulses.    Procedures    Imaging Results (Most Recent)     Procedure Component Value Units Date/Time    XR Foot 3+ View Right [[id number]] Resulted: 12/28/22 1205     Updated: 12/28/22 1206    Narrative:      Indications: Follow-up right fifth metatarsal fracture    Views: AP, oblique, lateral right foot    Findings: Right fifth metatarsal fracture seen.  Fracture alignment is   stable.  Increasing callus formation about the fracture line.  All joints   are anatomically aligned.    Comparative Data: Comparative data found and reviewed today                   Assessment and Plan    Diagnoses and all orders for this visit:    1. Closed nondisplaced fracture of fifth metatarsal bone of right foot with routine healing, subsequent encounter (Primary)    2. Right foot pain  -     XR Foot 3+ View Right        Mary Go presents today for follow-up of her right fifth metatarsal fracture with initial injury 10/2/2022 that we have been treating nonoperatively.  X-rays were reviewed with the patient today.  Okay for patient to gradually discontinue CAM fracture boot as tolerated.  We discussed the importance of wearing a well supported shoe for ambulation as she transitions.  Continue to apply pressure through the heel as needed.  Use ice and elevation as needed for inflammation.  Continue " with home exercises for range of motion and strength.      Patient will follow up in 5 weeks for reevaluation.  We will obtain new x-rays of the right foot at next visit.      Call or return if symptoms worsen or patient has any concerns.       Follow Up   Return in about 5 weeks (around 2/1/2023).  Patient was given instructions and counseling regarding her condition or for health maintenance advice. Please see specific information pulled into the AVS if appropriate.     Renee Alan PA-C  12/28/22  12:14 EST

## 2023-01-23 RX ORDER — NORGESTIMATE AND ETHINYL ESTRADIOL 7DAYSX3 28
KIT ORAL
Qty: 28 TABLET | Refills: 1 | Status: SHIPPED | OUTPATIENT
Start: 2023-01-23 | End: 2023-04-05

## 2023-01-23 NOTE — TELEPHONE ENCOUNTER
Patient overdue for Pap, refill sent with 1 additional refill, please schedule Pap within the next 2 months

## 2023-01-25 ENCOUNTER — OFFICE VISIT (OUTPATIENT)
Dept: FAMILY MEDICINE CLINIC | Facility: CLINIC | Age: 43
End: 2023-01-25
Payer: MEDICAID

## 2023-01-25 VITALS
WEIGHT: 243 LBS | OXYGEN SATURATION: 97 % | BODY MASS INDEX: 43.05 KG/M2 | HEART RATE: 90 BPM | HEIGHT: 63 IN | SYSTOLIC BLOOD PRESSURE: 110 MMHG | DIASTOLIC BLOOD PRESSURE: 76 MMHG

## 2023-01-25 DIAGNOSIS — M54.9 CHRONIC BACK PAIN, UNSPECIFIED BACK LOCATION, UNSPECIFIED BACK PAIN LATERALITY: ICD-10-CM

## 2023-01-25 DIAGNOSIS — Z01.419 ENCOUNTER FOR WELL WOMAN EXAM WITH ROUTINE GYNECOLOGICAL EXAM: ICD-10-CM

## 2023-01-25 DIAGNOSIS — T78.40XS ALLERGY, SEQUELA: ICD-10-CM

## 2023-01-25 DIAGNOSIS — G89.29 CHRONIC BACK PAIN, UNSPECIFIED BACK LOCATION, UNSPECIFIED BACK PAIN LATERALITY: ICD-10-CM

## 2023-01-25 DIAGNOSIS — Z01.419 ENCOUNTER FOR ANNUAL ROUTINE GYNECOLOGICAL EXAMINATION: ICD-10-CM

## 2023-01-25 DIAGNOSIS — E11.65 TYPE 2 DIABETES MELLITUS WITH HYPERGLYCEMIA, WITHOUT LONG-TERM CURRENT USE OF INSULIN: ICD-10-CM

## 2023-01-25 DIAGNOSIS — M54.2 NECK PAIN WITHOUT INJURY: ICD-10-CM

## 2023-01-25 DIAGNOSIS — Z12.4 CERVICAL CANCER SCREENING: Primary | ICD-10-CM

## 2023-01-25 DIAGNOSIS — L40.9 PSORIASIS: ICD-10-CM

## 2023-01-25 PROCEDURE — 99214 OFFICE O/P EST MOD 30 MIN: CPT | Performed by: NURSE PRACTITIONER

## 2023-01-25 PROCEDURE — 99396 PREV VISIT EST AGE 40-64: CPT | Performed by: NURSE PRACTITIONER

## 2023-01-25 PROCEDURE — G0123 SCREEN CERV/VAG THIN LAYER: HCPCS | Performed by: NURSE PRACTITIONER

## 2023-01-25 PROCEDURE — 87624 HPV HI-RISK TYP POOLED RSLT: CPT | Performed by: NURSE PRACTITIONER

## 2023-01-25 RX ORDER — TIRZEPATIDE 2.5 MG/.5ML
2.5 INJECTION, SOLUTION SUBCUTANEOUS WEEKLY
Qty: 2 ML | Refills: 0 | Status: SHIPPED | OUTPATIENT
Start: 2023-01-25 | End: 2023-03-07

## 2023-01-25 RX ORDER — LANCETS 28 GAUGE
EACH MISCELLANEOUS
Qty: 100 EACH | Refills: 5 | Status: SHIPPED | OUTPATIENT
Start: 2023-01-25

## 2023-01-25 RX ORDER — CYCLOBENZAPRINE HCL 10 MG
10 TABLET ORAL NIGHTLY PRN
Qty: 90 TABLET | Refills: 0 | Status: SHIPPED | OUTPATIENT
Start: 2023-01-25

## 2023-01-25 RX ORDER — BACLOFEN 10 MG/1
10 TABLET ORAL 4 TIMES DAILY
Qty: 40 TABLET | Refills: 2 | Status: CANCELLED | OUTPATIENT
Start: 2023-01-25

## 2023-01-25 RX ORDER — FLASH GLUCOSE SENSOR
1 KIT MISCELLANEOUS
Qty: 2 EACH | Refills: 11 | Status: CANCELLED | OUTPATIENT
Start: 2023-01-25

## 2023-01-25 RX ORDER — FLUTICASONE PROPIONATE 50 MCG
1 SPRAY, SUSPENSION (ML) NASAL DAILY
Qty: 16 G | Refills: 1 | Status: SHIPPED | OUTPATIENT
Start: 2023-01-25 | End: 2023-04-05

## 2023-01-25 RX ORDER — MELOXICAM 7.5 MG/1
7.5 TABLET ORAL DAILY
Qty: 30 TABLET | Refills: 2 | Status: SHIPPED | OUTPATIENT
Start: 2023-01-25

## 2023-01-25 RX ORDER — KETOCONAZOLE 20 MG/ML
SHAMPOO TOPICAL WEEKLY
Qty: 120 ML | Refills: 2 | Status: SHIPPED | OUTPATIENT
Start: 2023-01-25

## 2023-01-25 RX ORDER — BACLOFEN 10 MG/1
10 TABLET ORAL 2 TIMES DAILY
Qty: 60 TABLET | Refills: 2 | Status: SHIPPED | OUTPATIENT
Start: 2023-01-25

## 2023-01-25 NOTE — ASSESSMENT & PLAN NOTE
Patient not happy with Ozempic, requesting to try Mounjaro, we will make the switch and see if her insurance will cover.  Side effects and administration of medication discussed

## 2023-01-25 NOTE — PROGRESS NOTES
"Subjective   History of Present Illness    Mary Go is a 42 y.o. female who presents for annual exam.    Pt is requesting refills be sent to [facility]. Pt was sent an order for Free Style Terrie but never received it due to insurance. Will resend and work on Prior Auth.       Mammo: 08/08/22     Obstetric History:    Pregnancy:2, live birth: 2  OB History    No obstetric history on file.        Menstrual History:     No LMP recorded.   LMP approx 1 month ago 12-12-23     Sexual History:     Sexually active     No results found for: HPVAPTIMA    Current contraception: OCP (estrogen/progesterone)  History of abnormal Pap smear: once, had cervical polyp, approx 22 yrs ago   ESTHER exposure in utero: no  Received Gardasil immunization: no  Perform regular self breast exam: yes - .  Family history of uterine or ovarian cancer: no  Family History of cervical cancer: no  Family History of colon cancer/colon polyps: no  Regular self breast exam: yes  History of abnormal mammogram: no  Family history of breast cancer: yes - maternal grandmother   History of abnormal lipids: no    The following portions of the patient's history were reviewed and updated as appropriate: allergies, current medications, past family history, past medical history, past social history, past surgical history and problem list.    Review of Systems    A comprehensive review of systems was negative.     Objective   Physical Exam    /76   Pulse 90   Ht 160 cm (63\")   Wt 110 kg (243 lb)   SpO2 97%   BMI 43.05 kg/m²   Wt Readings from Last 3 Encounters:   01/25/23 110 kg (243 lb)   12/28/22 114 kg (251 lb 9.6 oz)   12/07/22 113 kg (250 lb)      BP Readings from Last 3 Encounters:   01/25/23 110/76   11/29/22 108/58   11/01/22 112/70        General:   alert, appears stated age and cooperative   Heart: regular rate and rhythm, S1, S2 normal, no murmur, click, rub or gallop   Lungs: clear to auscultation bilaterally   Abdomen: soft, " non-tender, without masses or organomegaly   Breast: inspection negative, no nipple discharge or bleeding, no masses or nodularity palpable   Vulva: normal   Vagina: normal mucosa, normal discharge   Cervix: no cervical motion tenderness   Uterus: non-tender   Adnexa: no mass, fullness, tenderness     Assessment & Plan   Diagnoses and all orders for this visit:    1. Cervical cancer screening (Primary)  -     IgP, Aptima HPV; Future  -     IgP, Aptima HPV    2. Type 2 diabetes mellitus with hyperglycemia, without long-term current use of insulin (HCC)  Assessment & Plan:  Patient not happy with Ozempic, requesting to try Mounjaro, we will make the switch and see if her insurance will cover.  Side effects and administration of medication discussed    Orders:  -     glucose blood (FREESTYLE LITE) test strip; USE AS DIRECTED TO CHECK BLOOD SUGAR TWO TIMES A DAY  Dispense: 100 each; Refill: 5  -     Lancets (freestyle) lancets; USE TO TEST BLOOD SUGAR TWO TIMES A DAY  Dispense: 100 each; Refill: 5  -     Tirzepatide (Mounjaro) 2.5 MG/0.5ML solution pen-injector; Inject 2.5 mg under the skin into the appropriate area as directed 1 (One) Time Per Week.  Dispense: 2 mL; Refill: 0    3. Neck pain without injury  -     cyclobenzaprine (FLEXERIL) 10 MG tablet; Take 1 tablet by mouth At Night As Needed for Muscle Spasms.  Dispense: 90 tablet; Refill: 0    4. Allergy, sequela  -     fluticasone (FLONASE) 50 MCG/ACT nasal spray; 1 spray into the nostril(s) as directed by provider Daily.  Dispense: 16 g; Refill: 1    5. Encounter for annual routine gynecological examination  -     IgP, Aptima HPV; Future  -     IgP, Aptima HPV    6. Psoriasis  -     ketoconazole (NIZORAL) 2 % shampoo; Apply  topically to the appropriate area as directed 1 (One) Time Per Week.  Dispense: 120 mL; Refill: 2    7. Encounter for well woman exam with routine gynecological exam  -     IgP, Aptima HPV; Future  -     IgP, Aptima HPV    8. Chronic back  pain, unspecified back location, unspecified back pain laterality  Comments:  Stable with Flexeril at night, baclofen during the day , meloxicam daily.  Continue current meds  Orders:  -     meloxicam (MOBIC) 7.5 MG tablet; Take 1 tablet by mouth Daily.  Dispense: 30 tablet; Refill: 2  -     cyclobenzaprine (FLEXERIL) 10 MG tablet; Take 1 tablet by mouth At Night As Needed for Muscle Spasms.  Dispense: 90 tablet; Refill: 0  -     baclofen (LIORESAL) 10 MG tablet; Take 1 tablet by mouth 2 (Two) Times a Day.  Dispense: 60 tablet; Refill: 2    Other orders  -     ProAir  (90 Base) MCG/ACT inhaler; Inhale 2 puffs Every 4 (Four) Hours As Needed for Wheezing.  Dispense: 8.5 g; Refill: 5      All questions answered.  Await pap smear results.    The patient is advised to attempt to lose weight, continue current medications and return for routine annual checkups.

## 2023-01-29 LAB
CYTOLOGIST CVX/VAG CYTO: NORMAL
CYTOLOGY CVX/VAG DOC CYTO: NORMAL
CYTOLOGY CVX/VAG DOC THIN PREP: NORMAL
DX ICD CODE: NORMAL
HIV 1 & 2 AB SER-IMP: NORMAL
HPV I/H RISK 4 DNA CVX QL PROBE+SIG AMP: NEGATIVE
OTHER STN SPEC: NORMAL
STAT OF ADQ CVX/VAG CYTO-IMP: NORMAL

## 2023-01-30 ENCOUNTER — TELEPHONE (OUTPATIENT)
Dept: ORTHOPEDIC SURGERY | Facility: CLINIC | Age: 43
End: 2023-01-30
Payer: MEDICAID

## 2023-01-31 ENCOUNTER — TELEPHONE (OUTPATIENT)
Dept: FAMILY MEDICINE CLINIC | Facility: CLINIC | Age: 43
End: 2023-01-31
Payer: MEDICAID

## 2023-01-31 NOTE — TELEPHONE ENCOUNTER
Pa submitted for Mounjaro approved     The request has been approved. The authorization is effective for a maximum of 12 fills from 01/31/2023 to 01/30/2024, as long as the member is enrolled in their current health plan. The request was approved as submitted. A written notification letter will follow with additional details

## 2023-02-06 ENCOUNTER — OFFICE VISIT (OUTPATIENT)
Dept: ORTHOPEDIC SURGERY | Facility: CLINIC | Age: 43
End: 2023-02-06
Payer: MEDICAID

## 2023-02-06 VITALS — BODY MASS INDEX: 43.05 KG/M2 | WEIGHT: 243 LBS | HEIGHT: 63 IN

## 2023-02-06 DIAGNOSIS — S92.354D CLOSED NONDISPLACED FRACTURE OF FIFTH METATARSAL BONE OF RIGHT FOOT WITH ROUTINE HEALING, SUBSEQUENT ENCOUNTER: Primary | ICD-10-CM

## 2023-02-06 DIAGNOSIS — M79.671 RIGHT FOOT PAIN: ICD-10-CM

## 2023-02-06 PROCEDURE — 99213 OFFICE O/P EST LOW 20 MIN: CPT | Performed by: PHYSICIAN ASSISTANT

## 2023-02-06 NOTE — PROGRESS NOTES
"Chief Complaint  Follow-up of the Right Foot    Subjective          Mary Go presents to [facility]   History of Present Illness    Mary Go presents today for a follow-up of her right foot.  Patient is erected metatarsal fracture with initial injury 10/2/2022 that be been treated nonoperatively.  Today, she states that she is doing well.  Has been ambulating into regular shoes without complications.  She reports pain only with temperature changes.  She states that she has good toe and ankle range of motion exercises.  Denies swelling.  Denies new injuries.  Denies numbness or tingling.      Allergies   Allergen Reactions   • Penicillins Unknown - High Severity     unknown   • Sulfa Antibiotics Rash and Other (See Comments)     .   • Latex Rash        Social History     Socioeconomic History   • Marital status: Single   Tobacco Use   • Smoking status: Former     Packs/day: 0.50     Years: 10.00     Pack years: 5.00     Types: Cigarettes   • Smokeless tobacco: Never   • Tobacco comments:     Quit smoking over 9 years ago   Vaping Use   • Vaping Use: Never used   Substance and Sexual Activity   • Alcohol use: Never   • Drug use: Never   • Sexual activity: Not Currently     Partners: Male     Birth control/protection: Condom, Pill        I reviewed the patient's chief complaint, history of present illness, review of systems, past medical history, surgical history, family history, social history, medications, and allergy list.     REVIEW OF SYSTEMS    Constitutional: Denies fevers, chills, weight loss  Cardiovascular: Denies chest pain, shortness of breath  Skin: Denies rashes, acute skin changes  Neurologic: Denies headache, loss of consciousness  MSK: Right foot pain      Objective   Vital Signs:   Ht 160 cm (63\")   Wt 110 kg (243 lb)   BMI 43.05 kg/m²     Body mass index is 43.05 kg/m².    Physical Exam    General: Alert. No acute distress.   Right lower extremity: " Nontender to the fifth metatarsal region.  Nontender to the remaining foot.  Calf soft, nontender.  Achilles intact.  Nontender to the medial lateral malleolus.  Ankle stable ligamentous stress.  No pain with ankle range of motion.  No pain with inversion or eversion testing.  Toe range of motion intact.  Sensation intact over the dorsal and plantar foot.  Palpable pedal pulses.    Procedures    Imaging Results (Most Recent)     Procedure Component Value Units Date/Time    XR Foot 3+ View Right [[id number]] Resulted: 02/06/23 1250     Updated: 02/06/23 1250    Narrative:      Indications: Follow-up right fifth metatarsal fracture    Views: AP, oblique, lateral right foot    Findings: Right fifth metatarsal fracture is seen.  Fracture alignment is   stable with an increase in callus formation.  All joints are anatomically   aligned.    Comparative Data: Comparative data found and reviewed today.                   Assessment and Plan    Diagnoses and all orders for this visit:    1. Closed nondisplaced fracture of fifth metatarsal bone of right foot with routine healing, subsequent encounter (Primary)    2. Right foot pain  -     XR Foot 3+ View Right        Mary Go presents today for follow-up of her right fifth metatarsal fracture that we have been treating nonoperatively with initial injury 10/2/2022.  X-rays reviewed with the patient today.  Patient instructed to continue with her home exercises regular toe and ankle range of motion.  Continue ambulating in a regular shoe as tolerated.  Use ice and elevation as needed for inflammation.  Continue to progress with activities as tolerated.      Patient will follow up as needed.       Call or return if symptoms worsen or patient has any concerns.       Follow Up   Return if symptoms worsen or fail to improve.  Patient was given instructions and counseling regarding her condition or for health maintenance advice. Please see specific information pulled into the  AVS if appropriate.     Renee Alan PA-C  02/06/23  12:55 EST

## 2023-03-06 DIAGNOSIS — E11.65 TYPE 2 DIABETES MELLITUS WITH HYPERGLYCEMIA, WITHOUT LONG-TERM CURRENT USE OF INSULIN: ICD-10-CM

## 2023-03-06 NOTE — TELEPHONE ENCOUNTER
Caller: Mary Go    Relationship: Self    Best call back number: [phone]     Requested Prescriptions:   Requested Prescriptions     Pending Prescriptions Disp Refills   • Mounjaro 2.5 MG/0.5ML solution pen-injector [Pharmacy Med Name: MOUNJARO 2.5 MG/0.5ML SOPN 2.5 Solution Pen-injector] 2 mL 0     Sig: INJECT 2.5 MG (CONTENTS OF 1 SYRINGE) UNDER THE SKIN INTO THE APPROPRIATE AREA AS DIRECTED 1 (ONE) TIME PER WEEK.        Pharmacy where request should be sent: [facility], [place] - [address] [place] [address], [address] - [phone]  - [phone]      Additional details provided by patient:  PATIENT STATED MEDICATION IS WORKING WELL AND MUCH BETTER THEN LAST PRESCRIBED MEDICATION.     Does the patient have less than a 3 day supply:  [x] Yes  [] No    Would you like a call back once the refill request has been completed: [x] Yes [] No    If the office needs to give you a call back, can they leave a voicemail: [x] Yes [] No    Beverly Eli Rep   03/06/23 13:43 EST

## 2023-03-07 RX ORDER — TIRZEPATIDE 2.5 MG/.5ML
INJECTION, SOLUTION SUBCUTANEOUS
Qty: 2 ML | Refills: 0 | Status: SHIPPED | OUTPATIENT
Start: 2023-03-07 | End: 2023-04-05

## 2023-03-20 ENCOUNTER — TELEPHONE (OUTPATIENT)
Dept: FAMILY MEDICINE CLINIC | Facility: CLINIC | Age: 43
End: 2023-03-20

## 2023-03-20 NOTE — TELEPHONE ENCOUNTER
Caller: Mary Go    Relationship: Self    Best call back number:[phone]    What is the best time to reach you: ANY    Who are you requesting to speak with (clinical staff, provider,  specific staff member): CLINICAL    What was the call regarding: PATIENT STATED THAT SHE IS NEEDING TO KNOW IF LABS ARE ORDERED AND WHEN SHE CAN GO TO HAVE THEM DONE.     Do you require a callback: YES

## 2023-03-20 NOTE — TELEPHONE ENCOUNTER
These labs were ordered off of abnormal lab results from previous drawl, any additional labs will be ordered at next office visit

## 2023-03-27 ENCOUNTER — LAB (OUTPATIENT)
Dept: LAB | Facility: HOSPITAL | Age: 43
End: 2023-03-27
Payer: MEDICAID

## 2023-03-27 DIAGNOSIS — D72.829 LEUKOCYTOSIS, UNSPECIFIED TYPE: ICD-10-CM

## 2023-03-27 DIAGNOSIS — E55.9 VITAMIN D DEFICIENCY: ICD-10-CM

## 2023-03-27 LAB
25(OH)D3 SERPL-MCNC: 33.8 NG/ML (ref 30–100)
BASOPHILS # BLD AUTO: 0.06 10*3/MM3 (ref 0–0.2)
BASOPHILS NFR BLD AUTO: 0.6 % (ref 0–1.5)
DEPRECATED RDW RBC AUTO: 42.3 FL (ref 37–54)
EOSINOPHIL # BLD AUTO: 0.18 10*3/MM3 (ref 0–0.4)
EOSINOPHIL NFR BLD AUTO: 1.8 % (ref 0.3–6.2)
ERYTHROCYTE [DISTWIDTH] IN BLOOD BY AUTOMATED COUNT: 12.1 % (ref 12.3–15.4)
HCT VFR BLD AUTO: 41.2 % (ref 34–46.6)
HGB BLD-MCNC: 14.2 G/DL (ref 12–15.9)
IMM GRANULOCYTES # BLD AUTO: 0.04 10*3/MM3 (ref 0–0.05)
IMM GRANULOCYTES NFR BLD AUTO: 0.4 % (ref 0–0.5)
LYMPHOCYTES # BLD AUTO: 2.99 10*3/MM3 (ref 0.7–3.1)
LYMPHOCYTES NFR BLD AUTO: 29.2 % (ref 19.6–45.3)
MCH RBC QN AUTO: 32.3 PG (ref 26.6–33)
MCHC RBC AUTO-ENTMCNC: 34.5 G/DL (ref 31.5–35.7)
MCV RBC AUTO: 93.8 FL (ref 79–97)
MONOCYTES # BLD AUTO: 0.51 10*3/MM3 (ref 0.1–0.9)
MONOCYTES NFR BLD AUTO: 5 % (ref 5–12)
NEUTROPHILS NFR BLD AUTO: 6.46 10*3/MM3 (ref 1.7–7)
NEUTROPHILS NFR BLD AUTO: 63 % (ref 42.7–76)
NRBC BLD AUTO-RTO: 0 /100 WBC (ref 0–0.2)
PLATELET # BLD AUTO: 343 10*3/MM3 (ref 140–450)
PMV BLD AUTO: 9.5 FL (ref 6–12)
RBC # BLD AUTO: 4.39 10*6/MM3 (ref 3.77–5.28)
WBC NRBC COR # BLD: 10.24 10*3/MM3 (ref 3.4–10.8)

## 2023-03-27 PROCEDURE — 85025 COMPLETE CBC W/AUTO DIFF WBC: CPT

## 2023-03-27 PROCEDURE — 36415 COLL VENOUS BLD VENIPUNCTURE: CPT

## 2023-03-27 PROCEDURE — 82306 VITAMIN D 25 HYDROXY: CPT

## 2023-04-03 ENCOUNTER — TELEPHONE (OUTPATIENT)
Dept: FAMILY MEDICINE CLINIC | Facility: CLINIC | Age: 43
End: 2023-04-03
Payer: MEDICAID

## 2023-04-03 DIAGNOSIS — E11.65 TYPE 2 DIABETES MELLITUS WITH HYPERGLYCEMIA, WITHOUT LONG-TERM CURRENT USE OF INSULIN: ICD-10-CM

## 2023-04-03 DIAGNOSIS — T78.40XS ALLERGY, SEQUELA: ICD-10-CM

## 2023-04-03 DIAGNOSIS — F32.A DEPRESSION, UNSPECIFIED DEPRESSION TYPE: ICD-10-CM

## 2023-04-03 RX ORDER — TIRZEPATIDE 2.5 MG/.5ML
INJECTION, SOLUTION SUBCUTANEOUS
Qty: 2 ML | Refills: 0 | Status: CANCELLED | OUTPATIENT
Start: 2023-04-03

## 2023-04-03 NOTE — TELEPHONE ENCOUNTER
Caller: Abbi Mary MAYNOR    Relationship: Self    Best call back number: [phone]    Requested Prescriptions:   Requested Prescriptions     Pending Prescriptions Disp Refills   • Tirzepatide (Mounjaro) 2.5 MG/0.5ML solution pen-injector 2 mL 0        Pharmacy where request should be sent:  [facility] [address] [facility], [place] [address] - [phone] [facility] [phone]   [phone]    Last office visit with prescribing clinician: 1/25/2023   Last telemedicine visit with prescribing clinician: 4/25/2023   Next office visit with prescribing clinician: 4/25/2023      PATIENT WOULD LIKE REFILLS SENT IN WITH THIS PRESCRIPTION.     Does the patient have less than a 3 day supply:  [x] Yes  [] No    Would you like a call back once the refill request has been completed: [] Yes [x] No    If the office needs to give you a call back, can they leave a voicemail: [] Yes [x] No    Catalina Fish, PCT   04/03/23 11:00 EDT

## 2023-04-05 RX ORDER — NORGESTIMATE AND ETHINYL ESTRADIOL 7DAYSX3 LO
KIT ORAL
Qty: 28 TABLET | Refills: 0 | Status: SHIPPED | OUTPATIENT
Start: 2023-04-05

## 2023-04-05 RX ORDER — CITALOPRAM 20 MG/1
TABLET ORAL
Qty: 30 TABLET | Refills: 0 | Status: SHIPPED | OUTPATIENT
Start: 2023-04-05

## 2023-04-05 RX ORDER — BUSPIRONE HYDROCHLORIDE 10 MG/1
TABLET ORAL
Qty: 60 TABLET | Refills: 0 | Status: SHIPPED | OUTPATIENT
Start: 2023-04-05

## 2023-04-05 RX ORDER — FLUTICASONE PROPIONATE 50 MCG
SPRAY, SUSPENSION (ML) NASAL
Qty: 16 G | Refills: 0 | Status: SHIPPED | OUTPATIENT
Start: 2023-04-05

## 2023-04-05 RX ORDER — TIRZEPATIDE 2.5 MG/.5ML
INJECTION, SOLUTION SUBCUTANEOUS
Qty: 2 ML | Refills: 0 | Status: SHIPPED | OUTPATIENT
Start: 2023-04-05

## 2023-04-25 ENCOUNTER — OFFICE VISIT (OUTPATIENT)
Dept: FAMILY MEDICINE CLINIC | Facility: CLINIC | Age: 43
End: 2023-04-25
Payer: MEDICAID

## 2023-04-25 ENCOUNTER — LAB (OUTPATIENT)
Dept: LAB | Facility: HOSPITAL | Age: 43
End: 2023-04-25
Payer: MEDICAID

## 2023-04-25 VITALS
OXYGEN SATURATION: 96 % | DIASTOLIC BLOOD PRESSURE: 55 MMHG | BODY MASS INDEX: 43.59 KG/M2 | HEART RATE: 86 BPM | WEIGHT: 246 LBS | SYSTOLIC BLOOD PRESSURE: 103 MMHG | HEIGHT: 63 IN

## 2023-04-25 DIAGNOSIS — E11.65 TYPE 2 DIABETES MELLITUS WITH HYPERGLYCEMIA, WITHOUT LONG-TERM CURRENT USE OF INSULIN: ICD-10-CM

## 2023-04-25 DIAGNOSIS — Z78.9 ALLERGY HISTORY UNKNOWN: ICD-10-CM

## 2023-04-25 DIAGNOSIS — J32.9 SINUSITIS, UNSPECIFIED CHRONICITY, UNSPECIFIED LOCATION: Primary | ICD-10-CM

## 2023-04-25 DIAGNOSIS — J30.9 ALLERGIC RHINITIS, UNSPECIFIED SEASONALITY, UNSPECIFIED TRIGGER: ICD-10-CM

## 2023-04-25 LAB
ALBUMIN SERPL-MCNC: 4.3 G/DL (ref 3.5–5.2)
ALBUMIN UR-MCNC: 6.9 MG/DL
ALBUMIN/GLOB SERPL: 1.3 G/DL
ALP SERPL-CCNC: 79 U/L (ref 39–117)
ALT SERPL W P-5'-P-CCNC: 22 U/L (ref 1–33)
ANION GAP SERPL CALCULATED.3IONS-SCNC: 10.9 MMOL/L (ref 5–15)
AST SERPL-CCNC: 22 U/L (ref 1–32)
BILIRUB SERPL-MCNC: 0.4 MG/DL (ref 0–1.2)
BUN SERPL-MCNC: 10 MG/DL (ref 6–20)
BUN/CREAT SERPL: 11 (ref 7–25)
CALCIUM SPEC-SCNC: 9.9 MG/DL (ref 8.6–10.5)
CHLORIDE SERPL-SCNC: 103 MMOL/L (ref 98–107)
CO2 SERPL-SCNC: 24.1 MMOL/L (ref 22–29)
CREAT SERPL-MCNC: 0.91 MG/DL (ref 0.57–1)
CREAT UR-MCNC: 166.4 MG/DL
EGFRCR SERPLBLD CKD-EPI 2021: 80.4 ML/MIN/1.73
GLOBULIN UR ELPH-MCNC: 3.3 GM/DL
GLUCOSE SERPL-MCNC: 95 MG/DL (ref 65–99)
HBA1C MFR BLD: 6.8 % (ref 4.8–5.6)
MICROALBUMIN/CREAT UR: 41.5 MG/G
POTASSIUM SERPL-SCNC: 4.5 MMOL/L (ref 3.5–5.2)
PROT SERPL-MCNC: 7.6 G/DL (ref 6–8.5)
SODIUM SERPL-SCNC: 138 MMOL/L (ref 136–145)

## 2023-04-25 PROCEDURE — 82043 UR ALBUMIN QUANTITATIVE: CPT

## 2023-04-25 PROCEDURE — 82570 ASSAY OF URINE CREATININE: CPT

## 2023-04-25 PROCEDURE — 83036 HEMOGLOBIN GLYCOSYLATED A1C: CPT

## 2023-04-25 PROCEDURE — 1159F MED LIST DOCD IN RCRD: CPT | Performed by: NURSE PRACTITIONER

## 2023-04-25 PROCEDURE — 99214 OFFICE O/P EST MOD 30 MIN: CPT | Performed by: NURSE PRACTITIONER

## 2023-04-25 PROCEDURE — 36415 COLL VENOUS BLD VENIPUNCTURE: CPT

## 2023-04-25 PROCEDURE — 1160F RVW MEDS BY RX/DR IN RCRD: CPT | Performed by: NURSE PRACTITIONER

## 2023-04-25 PROCEDURE — 80053 COMPREHEN METABOLIC PANEL: CPT

## 2023-04-25 RX ORDER — TIRZEPATIDE 5 MG/.5ML
5 INJECTION, SOLUTION SUBCUTANEOUS
Qty: 2 ML | Refills: 3 | Status: SHIPPED | OUTPATIENT
Start: 2023-04-25

## 2023-04-25 RX ORDER — CEPHALEXIN 500 MG/1
500 CAPSULE ORAL 2 TIMES DAILY
Qty: 20 CAPSULE | Refills: 0 | Status: SHIPPED | OUTPATIENT
Start: 2023-04-25

## 2023-04-25 NOTE — PROGRESS NOTES
Chief Complaint  Diabetes (3 month follow up ) and Sinus Pressure    SUBJECTIVE  Mary Go presents to [facility] for 3 month follow up.    Patient here today for 3-month follow-up on diabetes.  She has been tolerating the Mounjaro well, is ready to increase her dose.  States that she has felt much better since starting the Mounjaro    States that she has been having some sinus pressure and drainage for a week or so, states that she feels like it is developing a sinus infection.  Patient reports that while her chart does list penicillin as an allergy, she does not think that she is actually allergic.  Reports that her mother told her that she was allergic as a child but cannot specifically remember what her reaction was, patient reports that she has had cephalosporins several times including Keflex which she tolerates with no issues, patient also reports that she has taken small amounts of oral amoxicillin liquid trying to get her child to take their medication and has never had problems from that.  She is interested in seeing allergist for testing to see if she is actually allergic to penicillins.  States she would like to be allergy tested in general as well as she does have a lot of seasonal allergies      History of Present Illness  Past Medical History:   Diagnosis Date   • Allergic    • Allergy, unspecified, initial encounter    • Anemia    • Ankle sprain    • Arthritis    • Arthritis of back 2002   • Arthritis of neck 2002   • Asthma    • Cervical disc disorder    • Colitis, ulcerative 2002   • Colon polyp    • Depression    • Diabetes mellitus type 2 in nonobese 03/06/2018   • Fracture of wrist     2nd grade   • Fracture, finger 2018   • Fracture, foot    • Limb swelling    • Low back pain    • Obesity 03/06/2018   • Pain in both knees 03/20/2018    UNSPECIFIED CHRONICITY   • Psoriasis 07/30/2019   • Reflux esophagitis    • Seasonal allergies    • Sinusitis, chronic     • Skin disorder    • SOB (shortness of breath)    • Tendonitis 03/20/2018    INSERTIONAL PATELLAR TENDONITIS, BILATERAL   • Thoracic disc disorder    • Wrist sprain       Family History   Problem Relation Age of Onset   • Diabetes Mother         UNSPECIFIED TYPE   • Arthritis Mother    • Osteoporosis Mother    • Anesthesia problems Mother         Pseudocholinesterase   • Broken bones Mother    • Kidney disease Mother    • Stroke Mother    • Stroke Sister    • Diabetes Sister         UNSPECIFIED TYPE   • Arthritis Sister    • Osteoporosis Sister    • Breast cancer Maternal Grandmother         MALIGNANT   • Heart disease Maternal Grandmother    • Cancer Maternal Grandmother         Breast   • Hearing loss Maternal Grandmother    • Kidney disease Maternal Grandmother    • Stroke Maternal Grandfather    • Heart disease Maternal Grandfather    • Breast cancer Paternal Grandmother         MALIGNANT   • Cancer Paternal Grandmother    • Cancer Paternal Grandfather         UNSPECIFIED   • COPD Other    • Emphysema Other    • Diabetes Other    • Diabetes Sister    • Liver disease Sister    • Miscarriages / Stillbirths Sister       Past Surgical History:   Procedure Laterality Date   • CERVICAL POLYPECTOMY  2001   • COLONOSCOPY  07/13/2018    REPEAT IN 3 YEARS (ELIZABETH)   • DIAGNOSTIC LAPAROSCOPY     • ENDOSCOPY  07/31/2018   • POLYPECTOMY  2002    STOMACH POLYP REMOVED   • WISDOM TOOTH EXTRACTION  1997        Current Outpatient Medications:   •  baclofen (LIORESAL) 10 MG tablet, Take 1 tablet by mouth 2 (Two) Times a Day., Disp: 60 tablet, Rfl: 2  •  busPIRone (BUSPAR) 10 MG tablet, TAKE ONE TABLET BY MOUTH THREE TIMES A DAY, Disp: 60 tablet, Rfl: 0  •  citalopram (CeleXA) 20 MG tablet, TAKE ONE TABLET BY MOUTH EVERY DAY, Disp: 30 tablet, Rfl: 0  •  cyclobenzaprine (FLEXERIL) 10 MG tablet, Take 1 tablet by mouth At Night As Needed for Muscle Spasms., Disp: 90 tablet, Rfl: 0  •  empagliflozin (Jardiance) 10 MG tablet tablet,  Take 1 tablet by mouth Daily., Disp: 90 tablet, Rfl: 1  •  ergocalciferol (ERGOCALCIFEROL) 1.25 MG (29165 UT) capsule, Take 1 capsule by mouth 2 (Two) Times a Week., Disp: 26 capsule, Rfl: 1  •  fluticasone (FLONASE) 50 MCG/ACT nasal spray, SPRAY 1 SPRAY INTO EACH NOSTRIL EVERY DAY, Disp: 16 g, Rfl: 0  •  glucose blood (FREESTYLE LITE) test strip, USE AS DIRECTED TO CHECK BLOOD SUGAR TWO TIMES A DAY, Disp: 100 each, Rfl: 5  •  ibuprofen (ADVIL,MOTRIN) 800 MG tablet, Take 1 tablet by mouth Every 6 (Six) Hours As Needed for Mild Pain., Disp: 30 tablet, Rfl: 0  •  ketoconazole (NIZORAL) 2 % shampoo, Apply  topically to the appropriate area as directed 1 (One) Time Per Week., Disp: 120 mL, Rfl: 2  •  Lancets (freestyle) lancets, USE TO TEST BLOOD SUGAR TWO TIMES A DAY, Disp: 100 each, Rfl: 5  •  loratadine (CLARITIN) 10 MG tablet, Claritin 10 mg oral tablet take 1 tablet (10 mg) by oral route once daily   Active, Disp: , Rfl:   •  meloxicam (MOBIC) 7.5 MG tablet, Take 1 tablet by mouth Daily., Disp: 30 tablet, Rfl: 2  •  montelukast (SINGULAIR) 10 MG tablet, TAKE ONE TABLET BY MOUTH EVERY EVENING, Disp: 30 tablet, Rfl: 5  •  Omeprazole (PRILOSEC PO), Take 1 tablet by mouth Daily As Needed., Disp: , Rfl:   •  ProAir  (90 Base) MCG/ACT inhaler, Inhale 2 puffs Every 4 (Four) Hours As Needed for Wheezing., Disp: 8.5 g, Rfl: 5  •  Tri-Lo-Kathy 0.18/0.215/0.25 MG-25 MCG per tablet, TAKE 1 TABLET BY ORAL ROUTE ONCE DAILY, Disp: 28 tablet, Rfl: 0  •  cephalexin (Keflex) 500 MG capsule, Take 1 capsule by mouth 2 (Two) Times a Day., Disp: 20 capsule, Rfl: 0  •  Continuous Blood Gluc Sensor (3D SystemsStyle Terrie 14 Day Sensor) misc, 1 each Every 14 (Fourteen) Days. (Patient not taking: Reported on 4/25/2023), Disp: 2 each, Rfl: 11  •  ondansetron ODT (ZOFRAN-ODT) 4 MG disintegrating tablet, Place 1 tablet on the tongue Every 8 (Eight) Hours As Needed for Nausea or Vomiting. (Patient not taking: Reported on 4/25/2023), Disp: 12  "tablet, Rfl: 0  •  Tirzepatide (Mounjaro) 5 MG/0.5ML solution pen-injector, Inject 0.5 mL under the skin into the appropriate area as directed Every 7 (Seven) Days., Disp: 2 mL, Rfl: 3    OBJECTIVE  Vital Signs:   /55 (BP Location: Right arm, Patient Position: Sitting, Cuff Size: Adult)   Pulse 86   Ht 160 cm (63\")   Wt 112 kg (246 lb)   SpO2 96%   BMI 43.58 kg/m²    Estimated body mass index is 43.58 kg/m² as calculated from the following:    Height as of this encounter: 160 cm (63\").    Weight as of this encounter: 112 kg (246 lb).     Wt Readings from Last 3 Encounters:   04/25/23 112 kg (246 lb)   02/06/23 110 kg (243 lb)   01/25/23 110 kg (243 lb)     BP Readings from Last 3 Encounters:   04/25/23 103/55   01/25/23 110/76   11/29/22 108/58       Physical Exam  Vitals reviewed.   Constitutional:       Appearance: Normal appearance. She is well-developed.   HENT:      Head: Normocephalic and atraumatic.      Right Ear: External ear normal.      Left Ear: External ear normal.   Eyes:      Conjunctiva/sclera: Conjunctivae normal.      Pupils: Pupils are equal, round, and reactive to light.   Cardiovascular:      Rate and Rhythm: Normal rate and regular rhythm.      Heart sounds: No murmur heard.    No friction rub. No gallop.   Pulmonary:      Effort: Pulmonary effort is normal.      Breath sounds: Normal breath sounds. No wheezing or rhonchi.   Skin:     General: Skin is warm and dry.   Neurological:      Mental Status: She is alert and oriented to person, place, and time.      Cranial Nerves: No cranial nerve deficit.   Psychiatric:         Mood and Affect: Mood and affect normal.         Behavior: Behavior normal.         Thought Content: Thought content normal.         Judgment: Judgment normal.          Result Review    [facility]        11/1/2022    12:34 12/13/2022    11:23   [facility]   Glucose 124   115     BUN 9   8     Creatinine 0.81   0.78     EGFR 92.5   96.8     Sodium 137   136     Potassium 4.1   4.2 "     Chloride 102   103     Calcium 9.4   8.9     Total Protein 7.4   5.9     Albumin 4.20   4.10     Globulin 3.2   1.8     Total Bilirubin 0.8   0.4     Alkaline Phosphatase 89   77     AST (SGOT) 16   17     ALT (SGPT) 14   22     Albumin/Globulin Ratio 1.3   2.3     BUN/Creatinine Ratio 11.1   10.3     Anion Gap 12.9   9.0       CBC        11/1/2022    12:34 12/13/2022    11:23 3/27/2023    15:48   CBC   WBC 9.68   11.14   10.24     RBC 4.58   4.49   4.39     Hemoglobin 14.7   14.2   14.2     Hematocrit 41.5   41.6   41.2     MCV 90.6   92.7   93.8     MCH 32.1   31.6   32.3     MCHC 35.4   34.1   34.5     RDW 11.9   12.3   12.1     Platelets 293   369   343       Lipid Panel        12/13/2022    11:23   Lipid Panel   Total Cholesterol 159     Triglycerides 94     HDL Cholesterol 57     VLDL Cholesterol 17     LDL Cholesterol  85     LDL/HDL Ratio 1.46       TSH        12/13/2022    11:23   TSH   TSH 2.170       Most Recent A1C        12/13/2022    11:23   HGBA1C Most Recent   Hemoglobin A1C 6.90         No Images in the past 120 days found..     The above data has been reviewed by NIKOLE Odom 04/25/2023 06:09 EDT.          Patient Care Team:  Nicolle Mcmahon APRN as PCP - General (Nurse Practitioner)    Class 3 Severe Obesity (BMI >=40). Obesity-related health conditions include the following: diabetes mellitus. Obesity is unchanged. BMI is is above average; BMI management plan is completed. We discussed portion control and increasing exercise.       ASSESSMENT & PLAN    Diagnoses and all orders for this visit:    1. Sinusitis, unspecified chronicity, unspecified location (Primary)  -     cephalexin (Keflex) 500 MG capsule; Take 1 capsule by mouth 2 (Two) Times a Day.  Dispense: 20 capsule; Refill: 0    2. Allergy history unknown  -     Ambulatory Referral to Allergy    3. Allergic rhinitis, unspecified seasonality, unspecified trigger  Assessment & Plan:  Not well controlled at present, she will  continue her current medications we are referring to allergy for further evaluation and testing    Orders:  -     Ambulatory Referral to Allergy    4. Type 2 diabetes mellitus with hyperglycemia, without long-term current use of insulin  Assessment & Plan:  A1c fairly well controlled upon last check at 6.9%, patient recently switched from Ozempic to Mounjaro, tolerating well, we have increased the dose today,    Orders:  -     Tirzepatide (Mounjaro) 5 MG/0.5ML solution pen-injector; Inject 0.5 mL under the skin into the appropriate area as directed Every 7 (Seven) Days.  Dispense: 2 mL; Refill: 3  -     Comprehensive Metabolic Panel; Future  -     Hemoglobin A1c; Future  -     Microalbumin / Creatinine Urine Ratio - Urine, Clean Catch; Future       Tobacco Use: Medium Risk   • Smoking Tobacco Use: Former   • Smokeless Tobacco Use: Never   • Passive Exposure: Not on file       Follow Up     Return in about 3 months (around 7/25/2023), or if symptoms worsen or fail to improve.        Patient was given instructions and counseling regarding her condition or for health maintenance advice. Please see specific information pulled into the AVS if appropriate.   I have reviewed information obtained and documented by others and I have confirmed the accuracy of this documented note.    NIKOLE Odom

## 2023-04-25 NOTE — ASSESSMENT & PLAN NOTE
A1c fairly well controlled upon last check at 6.9%, patient recently switched from Ozempic to Mounjaro, tolerating well, we have increased the dose today,

## 2023-04-25 NOTE — ASSESSMENT & PLAN NOTE
Not well controlled at present, she will continue her current medications we are referring to allergy for further evaluation and testing

## 2023-05-05 DIAGNOSIS — T78.40XS ALLERGY, SEQUELA: ICD-10-CM

## 2023-05-05 DIAGNOSIS — F32.A DEPRESSION, UNSPECIFIED DEPRESSION TYPE: ICD-10-CM

## 2023-05-05 DIAGNOSIS — M54.2 NECK PAIN WITHOUT INJURY: ICD-10-CM

## 2023-05-05 DIAGNOSIS — G89.29 CHRONIC BACK PAIN, UNSPECIFIED BACK LOCATION, UNSPECIFIED BACK PAIN LATERALITY: ICD-10-CM

## 2023-05-05 DIAGNOSIS — M54.9 CHRONIC BACK PAIN, UNSPECIFIED BACK LOCATION, UNSPECIFIED BACK PAIN LATERALITY: ICD-10-CM

## 2023-05-05 DIAGNOSIS — L40.9 PSORIASIS: ICD-10-CM

## 2023-05-05 RX ORDER — FLUTICASONE PROPIONATE 50 MCG
SPRAY, SUSPENSION (ML) NASAL
Qty: 16 G | Refills: 0 | Status: SHIPPED | OUTPATIENT
Start: 2023-05-05

## 2023-05-05 RX ORDER — CYCLOBENZAPRINE HCL 10 MG
TABLET ORAL
Qty: 30 TABLET | Refills: 0 | Status: SHIPPED | OUTPATIENT
Start: 2023-05-05

## 2023-05-05 RX ORDER — BACLOFEN 10 MG/1
10 TABLET ORAL 2 TIMES DAILY
Qty: 60 TABLET | Refills: 2 | Status: SHIPPED | OUTPATIENT
Start: 2023-05-05

## 2023-05-05 RX ORDER — NORGESTIMATE AND ETHINYL ESTRADIOL 7DAYSX3 LO
KIT ORAL
Qty: 28 TABLET | Refills: 0 | Status: SHIPPED | OUTPATIENT
Start: 2023-05-05

## 2023-05-05 RX ORDER — KETOCONAZOLE 20 MG/ML
SHAMPOO TOPICAL
Qty: 120 ML | Refills: 2 | Status: SHIPPED | OUTPATIENT
Start: 2023-05-05

## 2023-05-05 RX ORDER — BUSPIRONE HYDROCHLORIDE 10 MG/1
TABLET ORAL
Qty: 60 TABLET | Refills: 0 | Status: SHIPPED | OUTPATIENT
Start: 2023-05-05

## 2023-05-05 RX ORDER — MELOXICAM 7.5 MG/1
TABLET ORAL
Qty: 30 TABLET | Refills: 2 | Status: SHIPPED | OUTPATIENT
Start: 2023-05-05

## 2023-05-05 RX ORDER — CITALOPRAM 20 MG/1
TABLET ORAL
Qty: 30 TABLET | Refills: 0 | Status: SHIPPED | OUTPATIENT
Start: 2023-05-05

## 2023-06-02 DIAGNOSIS — F32.A DEPRESSION, UNSPECIFIED DEPRESSION TYPE: ICD-10-CM

## 2023-06-02 DIAGNOSIS — G89.29 CHRONIC BACK PAIN, UNSPECIFIED BACK LOCATION, UNSPECIFIED BACK PAIN LATERALITY: ICD-10-CM

## 2023-06-02 DIAGNOSIS — M54.9 CHRONIC BACK PAIN, UNSPECIFIED BACK LOCATION, UNSPECIFIED BACK PAIN LATERALITY: ICD-10-CM

## 2023-06-02 DIAGNOSIS — M54.2 NECK PAIN WITHOUT INJURY: ICD-10-CM

## 2023-06-02 DIAGNOSIS — T78.40XS ALLERGY, SEQUELA: ICD-10-CM

## 2023-06-05 RX ORDER — MONTELUKAST SODIUM 10 MG/1
TABLET ORAL
Qty: 30 TABLET | Refills: 5 | Status: SHIPPED | OUTPATIENT
Start: 2023-06-05

## 2023-06-05 RX ORDER — BUSPIRONE HYDROCHLORIDE 10 MG/1
TABLET ORAL
Qty: 60 TABLET | Refills: 0 | Status: SHIPPED | OUTPATIENT
Start: 2023-06-05

## 2023-06-05 RX ORDER — FLUTICASONE PROPIONATE 50 MCG
SPRAY, SUSPENSION (ML) NASAL
Qty: 16 G | Refills: 0 | Status: SHIPPED | OUTPATIENT
Start: 2023-06-05

## 2023-06-05 RX ORDER — CITALOPRAM 20 MG/1
TABLET ORAL
Qty: 30 TABLET | Refills: 0 | Status: SHIPPED | OUTPATIENT
Start: 2023-06-05

## 2023-06-05 RX ORDER — CYCLOBENZAPRINE HCL 10 MG
TABLET ORAL
Qty: 30 TABLET | Refills: 0 | Status: SHIPPED | OUTPATIENT
Start: 2023-06-05

## 2023-06-05 RX ORDER — NORGESTIMATE AND ETHINYL ESTRADIOL 7DAYSX3 LO
KIT ORAL
Qty: 28 TABLET | Refills: 0 | Status: SHIPPED | OUTPATIENT
Start: 2023-06-05

## 2023-06-30 ENCOUNTER — TELEPHONE (OUTPATIENT)
Dept: FAMILY MEDICINE CLINIC | Facility: CLINIC | Age: 43
End: 2023-06-30

## 2023-06-30 DIAGNOSIS — F32.A DEPRESSION, UNSPECIFIED DEPRESSION TYPE: ICD-10-CM

## 2023-06-30 NOTE — TELEPHONE ENCOUNTER
Patient called office requesting to speak with MA regarding getting refills sent as soon as possible.

## 2023-06-30 NOTE — TELEPHONE ENCOUNTER
Incoming Refill Request      Medication requested (name and dose): buspirone 10 mg, celexa 20 mg, flonase 50 mg, flexeril 10 mg, jardiance 10 mg, vit d, tri-o-[facility]    Ph[facility] be [facility]    Additional details provided by patient: patient states she is trying to go through the mounjaro program and it will only cost her 25 dollars for a 3 month supply. We just have to send a script in.    Best call back number: [phone]    Does the patient have less than a 3 day supply:  [x] Yes  [] No    Beverly Adams Rep  06/30/23, 12:32 EDT      
stated

## 2023-07-26 DIAGNOSIS — E55.9 VITAMIN D DEFICIENCY: ICD-10-CM

## 2023-07-26 DIAGNOSIS — E11.65 TYPE 2 DIABETES MELLITUS WITH HYPERGLYCEMIA, WITHOUT LONG-TERM CURRENT USE OF INSULIN: ICD-10-CM

## 2023-07-26 DIAGNOSIS — L40.9 PSORIASIS: ICD-10-CM

## 2023-07-26 DIAGNOSIS — G89.29 CHRONIC BACK PAIN, UNSPECIFIED BACK LOCATION, UNSPECIFIED BACK PAIN LATERALITY: ICD-10-CM

## 2023-07-26 DIAGNOSIS — M54.9 CHRONIC BACK PAIN, UNSPECIFIED BACK LOCATION, UNSPECIFIED BACK PAIN LATERALITY: ICD-10-CM

## 2023-07-27 ENCOUNTER — OFFICE VISIT (OUTPATIENT)
Dept: FAMILY MEDICINE CLINIC | Facility: CLINIC | Age: 43
End: 2023-07-27
Payer: COMMERCIAL

## 2023-07-27 ENCOUNTER — TELEPHONE (OUTPATIENT)
Dept: FAMILY MEDICINE CLINIC | Facility: CLINIC | Age: 43
End: 2023-07-27

## 2023-07-27 VITALS
HEART RATE: 92 BPM | HEIGHT: 63 IN | SYSTOLIC BLOOD PRESSURE: 109 MMHG | DIASTOLIC BLOOD PRESSURE: 64 MMHG | BODY MASS INDEX: 42.35 KG/M2 | OXYGEN SATURATION: 97 % | WEIGHT: 239 LBS

## 2023-07-27 DIAGNOSIS — E11.65 TYPE 2 DIABETES MELLITUS WITH HYPERGLYCEMIA, WITHOUT LONG-TERM CURRENT USE OF INSULIN: ICD-10-CM

## 2023-07-27 DIAGNOSIS — Z13.29 THYROID DISORDER SCREEN: ICD-10-CM

## 2023-07-27 DIAGNOSIS — Z12.31 BREAST CANCER SCREENING BY MAMMOGRAM: ICD-10-CM

## 2023-07-27 DIAGNOSIS — Z00.00 ANNUAL PHYSICAL EXAM: Primary | ICD-10-CM

## 2023-07-27 PROCEDURE — 99396 PREV VISIT EST AGE 40-64: CPT | Performed by: NURSE PRACTITIONER

## 2023-07-27 RX ORDER — LEVOCETIRIZINE DIHYDROCHLORIDE 5 MG/1
5 TABLET, FILM COATED ORAL DAILY
COMMUNITY

## 2023-07-27 RX ORDER — DULAGLUTIDE 1.5 MG/.5ML
1.5 INJECTION, SOLUTION SUBCUTANEOUS WEEKLY
Qty: 2 ML | Refills: 2 | Status: SHIPPED | OUTPATIENT
Start: 2023-07-27 | End: 2023-07-31

## 2023-07-27 RX ORDER — AZELASTINE 1 MG/ML
2 SPRAY, METERED NASAL 2 TIMES DAILY
COMMUNITY

## 2023-07-27 RX ORDER — EPINEPHRINE 0.3 MG/.3ML
0.3 INJECTION SUBCUTANEOUS ONCE
COMMUNITY

## 2023-07-27 NOTE — ASSESSMENT & PLAN NOTE
Well controlled at present but We will switch to Trulicity due to insurance coverage, we will f/u in 3 months

## 2023-07-27 NOTE — TELEPHONE ENCOUNTER
Caller: Mary Go    Relationship: Self    Best call back number: [phone]     What is the best time to reach you: ANY    Who are you requesting to speak with (clinical staff, provider,  specific staff member): CLINICAL STAFF    What was the call regarding: PATIENT CALLED STATING THAT SHE NEEDS A PRIOR AUTHORIZATION ON HER TRULICITY AND JARDIANCE.

## 2023-07-27 NOTE — PROGRESS NOTES
Chief Complaint  Diabetes, ANNUAL EXAM     SUBJECTIVE  Mary Go presents to [facility] 3 month follow up on DM2 and annual exam      Pt has changed insurances and states the new one will not cover Mounjaro.      PT HAS BEEN ON OZEMPIC IN THE PAST AND HAD EXTREME NAUSEA AND FATIGUE, could not tolerate.     States they will cover Trulicity         Past Medical History:   Diagnosis Date    Allergic     Allergy, unspecified, initial encounter     Anemia     Ankle sprain     Arthritis     Arthritis of back 2002    Arthritis of neck 2002    Asthma     Cervical disc disorder     Colitis, ulcerative 2002    Colon polyp     Depression     Diabetes mellitus type 2 in nonobese 03/06/2018    Fracture of wrist     2nd grade    Fracture, finger 2018    Fracture, foot     Limb swelling     Low back pain     Obesity 03/06/2018    Pain in both knees 03/20/2018    UNSPECIFIED CHRONICITY    Psoriasis 07/30/2019    Reflux esophagitis     Seasonal allergies     Sinusitis, chronic     Skin disorder     SOB (shortness of breath)     Tendonitis 03/20/2018    INSERTIONAL PATELLAR TENDONITIS, BILATERAL    Thoracic disc disorder     Wrist sprain       Family History   Problem Relation Age of Onset    Diabetes Mother         UNSPECIFIED TYPE    Arthritis Mother     Osteoporosis Mother     Anesthesia problems Mother         Pseudocholinesterase    Broken bones Mother     Kidney disease Mother     Stroke Mother     Stroke Sister     Diabetes Sister         UNSPECIFIED TYPE    Arthritis Sister     Osteoporosis Sister     Breast cancer Maternal Grandmother         MALIGNANT    Heart disease Maternal Grandmother     Cancer Maternal Grandmother         Breast    Hearing loss Maternal Grandmother     Kidney disease Maternal Grandmother     Stroke Maternal Grandfather     Heart disease Maternal Grandfather     Breast cancer Paternal Grandmother         MALIGNANT    Cancer Paternal Grandmother     Cancer Paternal  Grandfather         UNSPECIFIED    COPD Other     Emphysema Other     Diabetes Other     Diabetes Sister     Liver disease Sister     Miscarriages / Stillbirths Sister       Past Surgical History:   Procedure Laterality Date    CERVICAL POLYPECTOMY  2001    COLONOSCOPY  07/13/2018    REPEAT IN 3 YEARS (ELIZABETH)    DIAGNOSTIC LAPAROSCOPY      ENDOSCOPY  07/31/2018    POLYPECTOMY  2002    STOMACH POLYP REMOVED    WISDOM TOOTH EXTRACTION  1997        Current Outpatient Medications:     azelastine (ASTELIN) 0.1 % nasal spray, 2 sprays into the nostril(s) as directed by provider 2 (Two) Times a Day., Disp: , Rfl:     busPIRone (BUSPAR) 10 MG tablet, TAKE ONE TABLET BY MOUTH THREE TIMES A DAY, Disp: 60 tablet, Rfl: 0    citalopram (CeleXA) 20 MG tablet, TAKE ONE TABLET BY MOUTH EVERY DAY, Disp: 30 tablet, Rfl: 0    Continuous Blood Gluc Sensor (FreeStyle Terrie 14 Day Sensor) [facility], 1 each Every 14 (Fourteen) Days., Disp: 2 each, Rfl: 11    cyclobenzaprine (FLEXERIL) 10 MG tablet, TAKE ONE TABLET BY MOUTH EVERY NIGHT AT BEDTIME IF NEEDED FOR MUSCLE SPASM, Disp: 30 tablet, Rfl: 0    EPINEPHrine (EPIPEN) 0.3 MG/0.3ML solution auto-injector injection, Inject 0.3 mL into the appropriate muscle as directed by prescriber 1 (One) Time., Disp: , Rfl:     ergocalciferol (ERGOCALCIFEROL) 1.25 MG (17082 UT) capsule, Take 1 capsule by mouth 2 (Two) Times a Week., Disp: 26 capsule, Rfl: 1    fluticasone (FLONASE) 50 MCG/ACT nasal spray, SPRAY 1 SPRAY INTO EACH NOSTRIL EVERY DAY, Disp: 16 g, Rfl: 0    glucose blood (FREESTYLE LITE) test strip, USE AS DIRECTED TO CHECK BLOOD SUGAR TWO TIMES A DAY, Disp: 100 each, Rfl: 5    ibuprofen (ADVIL,MOTRIN) 800 MG tablet, Take 1 tablet by mouth Every 6 (Six) Hours As Needed for Mild Pain., Disp: 30 tablet, Rfl: 0    Jardiance 10 MG tablet tablet, TAKE 1 TABLET BY MOUTH DAILY., Disp: 30 tablet, Rfl: 5    ketoconazole (NIZORAL) 2 % shampoo, APPLY TOPICALLY TO THE AFFECTED AREA ONCE WEEKLY, Disp: 120  "mL, Rfl: 2    Lancets (freestyle) lancets, USE TO TEST BLOOD SUGAR TWO TIMES A DAY, Disp: 100 each, Rfl: 5    levocetirizine (XYZAL) 5 MG tablet, Take 1 tablet by mouth Daily., Disp: , Rfl:     loratadine (CLARITIN) 10 MG tablet, Claritin 10 mg oral tablet take 1 tablet (10 mg) by oral route once daily   Active, Disp: , Rfl:     meloxicam (MOBIC) 7.5 MG tablet, TAKE ONE TABLET BY MOUTH EVERY DAY, Disp: 30 tablet, Rfl: 2    montelukast (SINGULAIR) 10 MG tablet, TAKE ONE TABLET BY MOUTH EVERY EVENING, Disp: 30 tablet, Rfl: 5    Omeprazole (PRILOSEC PO), Take 1 tablet by mouth Daily As Needed., Disp: , Rfl:     ProAir  (90 Base) MCG/ACT inhaler, Inhale 2 puffs Every 4 (Four) Hours As Needed for Wheezing., Disp: 8.5 g, Rfl: 5    Tri-Lo-Kathy 0.18/0.215/0.25 MG-25 MCG per tablet, TAKE 1 TABLET BY ORAL ROUTE ONCE DAILY, Disp: 28 tablet, Rfl: 0    azithromycin (Zithromax Z-Jonathon) 250 MG tablet, Take 2 PO today then take 1 daily on days 2-5 (Patient not taking: Reported on 7/27/2023), Disp: 6 tablet, Rfl: 0    cephalexin (Keflex) 500 MG capsule, Take 1 capsule by mouth 2 (Two) Times a Day. (Patient not taking: Reported on 7/27/2023), Disp: 20 capsule, Rfl: 0    Dulaglutide (Trulicity) 1.5 MG/0.5ML solution pen-injector, Inject 1.5 mg under the skin into the appropriate area as directed 1 (One) Time Per Week., Disp: 2 mL, Rfl: 2    OBJECTIVE  Vital Signs:   /64   Pulse 92   Ht 160 cm (63\")   Wt 108 kg (239 lb)   SpO2 97%   BMI 42.34 kg/m²    Estimated body mass index is 42.34 kg/m² as calculated from the following:    Height as of this encounter: 160 cm (63\").    Weight as of this encounter: 108 kg (239 lb).     Wt Readings from Last 3 Encounters:   07/27/23 108 kg (239 lb)   04/30/23 110 kg (243 lb 4.8 oz)   04/25/23 112 kg (246 lb)     BP Readings from Last 3 Encounters:   07/27/23 109/64   04/30/23 124/83   04/25/23 103/55       Physical Exam  Vitals reviewed.   Constitutional:       General: She is not in " acute distress.     Appearance: Normal appearance. She is well-developed. She is not diaphoretic.   HENT:      Head: Normocephalic and atraumatic. Hair is normal.      Right Ear: Hearing, tympanic membrane, ear canal and external ear normal. No decreased hearing noted. No drainage.      Left Ear: Hearing, tympanic membrane, ear canal and external ear normal. No decreased hearing noted.      Nose: Nose normal. No nasal deformity.      Mouth/Throat:      Mouth: Mucous membranes are moist.   Eyes:      General: Lids are normal.         Right eye: No discharge.         Left eye: No discharge.      Extraocular Movements: Extraocular movements intact.      Conjunctiva/sclera: Conjunctivae normal.      Pupils: Pupils are equal, round, and reactive to light.   Neck:      Thyroid: No thyromegaly.      Vascular: No JVD.   Cardiovascular:      Rate and Rhythm: Normal rate and regular rhythm.      Pulses: Normal pulses.      Heart sounds: Normal heart sounds. No murmur heard.    No friction rub. No gallop.   Pulmonary:      Effort: Pulmonary effort is normal. No respiratory distress.      Breath sounds: Normal breath sounds. No wheezing or rales.   Chest:      Chest wall: No tenderness.   Abdominal:      General: Bowel sounds are normal. There is no distension.      Palpations: Abdomen is soft. There is no mass.      Tenderness: There is no abdominal tenderness. There is no guarding or rebound.      Hernia: No hernia is present.   Musculoskeletal:         General: No tenderness or deformity. Normal range of motion.      Cervical back: Normal range of motion and neck supple.   Feet:      Right foot:      Protective Sensation: 7 sites tested.  7 sites sensed.      Skin integrity: Callus and dry skin present. No ulcer or skin breakdown.      Toenail Condition: Right toenails are normal.      Left foot:      Protective Sensation: 7 sites tested.  7 sites sensed.      Skin integrity: Callus and dry skin present. No ulcer or skin  breakdown.      Toenail Condition: Left toenails are normal.   Lymphadenopathy:      Cervical: No cervical adenopathy.   Skin:     General: Skin is warm and dry.      Findings: No erythema or rash.   Neurological:      Mental Status: She is alert and oriented to person, place, and time.      Cranial Nerves: No cranial nerve deficit.      Motor: No abnormal muscle tone.      Coordination: Coordination normal.      Deep Tendon Reflexes: Reflexes are normal and symmetric. Reflexes normal.   Psychiatric:         Mood and Affect: Mood normal.         Behavior: Behavior normal.         Thought Content: Thought content normal.         Judgment: Judgment normal.        Result Review    CMP          11/1/2022    12:34 12/13/2022    11:23 4/25/2023    13:21   CMP   Glucose 124  115  95    BUN 9  8  10    Creatinine 0.81  0.78  0.91    EGFR 92.5  96.8  80.4    Sodium 137  136  138    Potassium 4.1  4.2  4.5    Chloride 102  103  103    Calcium 9.4  8.9  9.9    Total Protein 7.4  5.9  7.6    Albumin 4.20  4.10  4.3    Globulin 3.2  1.8  3.3    Total Bilirubin 0.8  0.4  0.4    Alkaline Phosphatase 89  77  79    AST (SGOT) 16  17  22    ALT (SGPT) 14  22  22    Albumin/Globulin Ratio 1.3  2.3  1.3    BUN/Creatinine Ratio 11.1  10.3  11.0    Anion Gap 12.9  9.0  10.9      CBC          11/1/2022    12:34 12/13/2022    11:23 3/27/2023    15:48   CBC   WBC 9.68  11.14  10.24    RBC 4.58  4.49  4.39    Hemoglobin 14.7  14.2  14.2    Hematocrit 41.5  41.6  41.2    MCV 90.6  92.7  93.8    MCH 32.1  31.6  32.3    MCHC 35.4  34.1  34.5    RDW 11.9  12.3  12.1    Platelets 293  369  343      Lipid Panel          12/13/2022    11:23   Lipid Panel   Total Cholesterol 159    Triglycerides 94    HDL Cholesterol 57    VLDL Cholesterol 17    LDL Cholesterol  85    LDL/HDL Ratio 1.46      TSH          12/13/2022    11:23   TSH   TSH 2.170      Most Recent A1C          4/25/2023    13:21   HGBA1C Most Recent   Hemoglobin A1C 6.80        XR Chest 2  View    Result Date: 4/30/2023    No acute infiltrate is appreciated.    Please note that portions of this note were completed with a voice recognition program.  LUCAS BLACK JR, MD       Electronically Signed and Approved By: LUCAS BLACK JR, MD on 4/30/2023 at 20:33                 The above data has been reviewed by NIKOLE Odom 07/27/2023 08:18 EDT.          Patient Care Team:  Nicolle Mcmahon APRN as PCP - General (Nurse Practitioner)            ASSESSMENT & PLAN    Diagnoses and all orders for this visit:    1. Annual physical exam (Primary)  -     Comprehensive Metabolic Panel; Future  -     Lipid Panel; Future  -     Hemoglobin A1c; Future  -     Microalbumin / Creatinine Urine Ratio - Urine, Clean Catch; Future  -     CBC w AUTO Differential; Future  -     TSH Rfx On Abnormal To Free T4; Future    2. Type 2 diabetes mellitus with hyperglycemia, without long-term current use of insulin  Assessment & Plan:  Well controlled at present but We will switch to Trulicity due to insurance coverage, we will f/u in 3 months     Orders:  -     Dulaglutide (Trulicity) 1.5 MG/0.5ML solution pen-injector; Inject 1.5 mg under the skin into the appropriate area as directed 1 (One) Time Per Week.  Dispense: 2 mL; Refill: 2  -     Comprehensive Metabolic Panel; Future  -     Lipid Panel; Future  -     Hemoglobin A1c; Future  -     Microalbumin / Creatinine Urine Ratio - Urine, Clean Catch; Future  -     CBC w AUTO Differential; Future  -     TSH Rfx On Abnormal To Free T4; Future    3. Breast cancer screening by mammogram  -     Mammo Screening Digital Tomosynthesis Bilateral With CAD; Future    4. Thyroid disorder screen  -     TSH Rfx On Abnormal To Free T4; Future         Tobacco Use: Medium Risk    Smoking Tobacco Use: Former    Smokeless Tobacco Use: Never    Passive Exposure: Not on file         The patient is advised to follow healthy diet and exercise,  attempt to lose weight, continue current  medications, and return for routine annual checkups.    Follow Up     Return in 3 months (on 10/27/2023), or if symptoms worsen or fail to improve.        Patient was given instructions and counseling regarding her condition or for health maintenance advice. Please see specific information pulled into the AVS if appropriate.   I have reviewed information obtained and documented by others and I have confirmed the accuracy of this documented note.    NIKOLE Odom

## 2023-07-31 RX ORDER — TIRZEPATIDE 5 MG/.5ML
5 INJECTION, SOLUTION SUBCUTANEOUS WEEKLY
Qty: 2 ML | Refills: 2 | Status: SHIPPED | OUTPATIENT
Start: 2023-07-31 | End: 2023-08-03 | Stop reason: SDUPTHER

## 2023-07-31 NOTE — TELEPHONE ENCOUNTER
Patient was under the impression that they would not cover the Mounjaro but would cover the Trulicity, we will try sending the Mounjaro and see what they say, patient has previously tried and failed Ozempic, was not able to tolerate.  Prescription for Mounjaro sent

## 2023-08-03 ENCOUNTER — TELEPHONE (OUTPATIENT)
Dept: FAMILY MEDICINE CLINIC | Facility: CLINIC | Age: 43
End: 2023-08-03
Payer: COMMERCIAL

## 2023-08-03 RX ORDER — TIRZEPATIDE 5 MG/.5ML
5 INJECTION, SOLUTION SUBCUTANEOUS WEEKLY
Qty: 2 ML | Refills: 2 | Status: SHIPPED | OUTPATIENT
Start: 2023-08-03

## 2023-08-03 NOTE — TELEPHONE ENCOUNTER
Pt called stating she needed refill of Mounjaro - advised pt [facility]ill was [facility] into [facility] on 07/[facility]23 - pt stated she doesn't use [facility] and needs it sent to [facility].

## 2023-08-09 ENCOUNTER — LAB (OUTPATIENT)
Dept: LAB | Facility: HOSPITAL | Age: 43
End: 2023-08-09
Payer: COMMERCIAL

## 2023-08-09 DIAGNOSIS — E11.65 TYPE 2 DIABETES MELLITUS WITH HYPERGLYCEMIA, WITHOUT LONG-TERM CURRENT USE OF INSULIN: ICD-10-CM

## 2023-08-09 DIAGNOSIS — Z00.00 ANNUAL PHYSICAL EXAM: ICD-10-CM

## 2023-08-09 DIAGNOSIS — Z13.29 THYROID DISORDER SCREEN: ICD-10-CM

## 2023-08-09 LAB
ALBUMIN SERPL-MCNC: 4.2 G/DL (ref 3.5–5.2)
ALBUMIN UR-MCNC: 3.3 MG/DL
ALBUMIN/GLOB SERPL: 1.4 G/DL
ALP SERPL-CCNC: 83 U/L (ref 39–117)
ALT SERPL W P-5'-P-CCNC: 18 U/L (ref 1–33)
ANION GAP SERPL CALCULATED.3IONS-SCNC: 12.4 MMOL/L (ref 5–15)
AST SERPL-CCNC: 21 U/L (ref 1–32)
BASOPHILS # BLD AUTO: 0.07 10*3/MM3 (ref 0–0.2)
BASOPHILS NFR BLD AUTO: 0.7 % (ref 0–1.5)
BILIRUB SERPL-MCNC: 0.6 MG/DL (ref 0–1.2)
BUN SERPL-MCNC: 7 MG/DL (ref 6–20)
BUN/CREAT SERPL: 7.8 (ref 7–25)
CALCIUM SPEC-SCNC: 9.8 MG/DL (ref 8.6–10.5)
CHLORIDE SERPL-SCNC: 103 MMOL/L (ref 98–107)
CHOLEST SERPL-MCNC: 198 MG/DL (ref 0–200)
CO2 SERPL-SCNC: 22.6 MMOL/L (ref 22–29)
CREAT SERPL-MCNC: 0.9 MG/DL (ref 0.57–1)
CREAT UR-MCNC: 143.3 MG/DL
DEPRECATED RDW RBC AUTO: 42.4 FL (ref 37–54)
EGFRCR SERPLBLD CKD-EPI 2021: 81.5 ML/MIN/1.73
EOSINOPHIL # BLD AUTO: 0.13 10*3/MM3 (ref 0–0.4)
EOSINOPHIL NFR BLD AUTO: 1.3 % (ref 0.3–6.2)
ERYTHROCYTE [DISTWIDTH] IN BLOOD BY AUTOMATED COUNT: 12.6 % (ref 12.3–15.4)
GLOBULIN UR ELPH-MCNC: 3 GM/DL
GLUCOSE SERPL-MCNC: 157 MG/DL (ref 65–99)
HBA1C MFR BLD: 6.5 % (ref 4.8–5.6)
HCT VFR BLD AUTO: 43.6 % (ref 34–46.6)
HDLC SERPL-MCNC: 51 MG/DL (ref 40–60)
HGB BLD-MCNC: 15.1 G/DL (ref 12–15.9)
IMM GRANULOCYTES # BLD AUTO: 0.03 10*3/MM3 (ref 0–0.05)
IMM GRANULOCYTES NFR BLD AUTO: 0.3 % (ref 0–0.5)
LDLC SERPL CALC-MCNC: 123 MG/DL (ref 0–100)
LDLC/HDLC SERPL: 2.35 {RATIO}
LYMPHOCYTES # BLD AUTO: 2.49 10*3/MM3 (ref 0.7–3.1)
LYMPHOCYTES NFR BLD AUTO: 25.4 % (ref 19.6–45.3)
MCH RBC QN AUTO: 32.2 PG (ref 26.6–33)
MCHC RBC AUTO-ENTMCNC: 34.6 G/DL (ref 31.5–35.7)
MCV RBC AUTO: 93 FL (ref 79–97)
MICROALBUMIN/CREAT UR: 23 MG/G
MONOCYTES # BLD AUTO: 0.6 10*3/MM3 (ref 0.1–0.9)
MONOCYTES NFR BLD AUTO: 6.1 % (ref 5–12)
NEUTROPHILS NFR BLD AUTO: 6.47 10*3/MM3 (ref 1.7–7)
NEUTROPHILS NFR BLD AUTO: 66.2 % (ref 42.7–76)
NRBC BLD AUTO-RTO: 0 /100 WBC (ref 0–0.2)
PLATELET # BLD AUTO: 372 10*3/MM3 (ref 140–450)
PMV BLD AUTO: 9.7 FL (ref 6–12)
POTASSIUM SERPL-SCNC: 4 MMOL/L (ref 3.5–5.2)
PROT SERPL-MCNC: 7.2 G/DL (ref 6–8.5)
RBC # BLD AUTO: 4.69 10*6/MM3 (ref 3.77–5.28)
SODIUM SERPL-SCNC: 138 MMOL/L (ref 136–145)
TRIGL SERPL-MCNC: 135 MG/DL (ref 0–150)
TSH SERPL DL<=0.05 MIU/L-ACNC: 1.41 UIU/ML (ref 0.27–4.2)
VLDLC SERPL-MCNC: 24 MG/DL (ref 5–40)
WBC NRBC COR # BLD: 9.79 10*3/MM3 (ref 3.4–10.8)

## 2023-08-09 PROCEDURE — 82043 UR ALBUMIN QUANTITATIVE: CPT

## 2023-08-09 PROCEDURE — 36415 COLL VENOUS BLD VENIPUNCTURE: CPT

## 2023-08-09 PROCEDURE — 83036 HEMOGLOBIN GLYCOSYLATED A1C: CPT

## 2023-08-09 PROCEDURE — 80061 LIPID PANEL: CPT

## 2023-08-09 PROCEDURE — 82570 ASSAY OF URINE CREATININE: CPT

## 2023-08-09 PROCEDURE — 80050 GENERAL HEALTH PANEL: CPT

## 2023-09-05 ENCOUNTER — TELEPHONE (OUTPATIENT)
Dept: FAMILY MEDICINE CLINIC | Facility: CLINIC | Age: 43
End: 2023-09-05

## 2023-09-05 RX ORDER — DULAGLUTIDE 1.5 MG/.5ML
1.5 INJECTION, SOLUTION SUBCUTANEOUS
Qty: 2 ML | Refills: 2 | Status: SHIPPED | OUTPATIENT
Start: 2023-09-05 | End: 2023-11-13

## 2023-09-05 RX ORDER — NORGESTIMATE AND ETHINYL ESTRADIOL 7DAYSX3 28
KIT ORAL
Qty: 28 TABLET | Refills: 3 | Status: SHIPPED | OUTPATIENT
Start: 2023-09-05

## 2023-09-05 NOTE — TELEPHONE ENCOUNTER
Caller: Mary Go    Relationship: Self    Best call back number: [phone]     What medication are you requesting: TRUCILITY      If a prescription is needed, what is your preferred pharmacy and phone number: [facility] - [place], [place] - [address], [address] - [phone]  - [phone]      Additional notes: PATIENT REPORTS THAT THE COST OF THE MOUNJARO HAS JUMPED UP FROM $25 TO $240 TO OVER A $1000 FOR  A MONTH SUPPLY -- PATIENT WOULD LIKE TO GO BACK TO [facility] - PATIENT IS DUE FOR WEEKLY INJECTION NEXT MONDAY    ALSO PLEASE INFORM INSURANCE THAT SHE HAS NO FAMILY HISTORY OF THYROID CANCER AND SOME OTHER DISEASE, THAT SHE DOES NOT HAVE    PLEASE ADVISE

## 2023-09-27 ENCOUNTER — OFFICE VISIT (OUTPATIENT)
Dept: FAMILY MEDICINE CLINIC | Facility: CLINIC | Age: 43
End: 2023-09-27
Payer: COMMERCIAL

## 2023-09-27 VITALS
OXYGEN SATURATION: 98 % | HEIGHT: 63 IN | SYSTOLIC BLOOD PRESSURE: 109 MMHG | WEIGHT: 224.2 LBS | DIASTOLIC BLOOD PRESSURE: 78 MMHG | BODY MASS INDEX: 39.73 KG/M2 | HEART RATE: 98 BPM

## 2023-09-27 DIAGNOSIS — F41.9 ANXIETY AND DEPRESSION: Primary | ICD-10-CM

## 2023-09-27 DIAGNOSIS — F32.A ANXIETY AND DEPRESSION: Primary | ICD-10-CM

## 2023-09-27 PROCEDURE — 99214 OFFICE O/P EST MOD 30 MIN: CPT | Performed by: NURSE PRACTITIONER

## 2023-09-27 RX ORDER — HYDROXYZINE HYDROCHLORIDE 25 MG/1
25 TABLET, FILM COATED ORAL 3 TIMES DAILY PRN
Qty: 30 TABLET | Refills: 0 | Status: SHIPPED | OUTPATIENT
Start: 2023-09-27
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
## 2023-09-27 NOTE — ASSESSMENT & PLAN NOTE
Poorly controlled at present, we have made referral to psychiatry, we are going to trial switching patient's Celexa to Trintellix, we are also going to trial hydroxyzine as needed for anxiety, side effects administration medication discussed, discussed with patient at length if she finds herself having any suicidal thoughts/thoughts of self-harm she is to stop the medication immediately, contact us, call suicide hotline, & let family or friend know.  Patient verbalized understanding

## 2023-10-05 ENCOUNTER — TELEPHONE (OUTPATIENT)
Dept: FAMILY MEDICINE CLINIC | Facility: CLINIC | Age: 43
End: 2023-10-05
Payer: COMMERCIAL

## 2023-10-05 NOTE — TELEPHONE ENCOUNTER
PA submitted and approved for Trintellix     PA Case: [id number], Status: Approved, Coverage Starts on: 10/5/2023 12:00:00 AM, Coverage Ends on: 10/4/2024 12:00:00 AM.

## 2023-10-11 ENCOUNTER — OFFICE VISIT (OUTPATIENT)
Dept: FAMILY MEDICINE CLINIC | Facility: CLINIC | Age: 43
End: 2023-10-11
Payer: COMMERCIAL

## 2023-10-11 VITALS
OXYGEN SATURATION: 100 % | BODY MASS INDEX: 40.57 KG/M2 | SYSTOLIC BLOOD PRESSURE: 98 MMHG | HEART RATE: 98 BPM | DIASTOLIC BLOOD PRESSURE: 30 MMHG | WEIGHT: 229 LBS | HEIGHT: 63 IN

## 2023-10-11 DIAGNOSIS — Z23 NEED FOR STREPTOCOCCUS PNEUMONIAE AND INFLUENZA VACCINATION: ICD-10-CM

## 2023-10-11 DIAGNOSIS — F41.9 ANXIETY AND DEPRESSION: Primary | ICD-10-CM

## 2023-10-11 DIAGNOSIS — F32.A ANXIETY AND DEPRESSION: Primary | ICD-10-CM

## 2023-10-11 NOTE — PROGRESS NOTES
Chief Complaint  Anxiety and Depression    SUBJECTIVE  Mary Go presents to [facility]     Pt here today for a two week follow up on anxiety and depression.     Pt reports her insurance did cover the Trintellix but it was ging to be $300 a month. Pt states the Hydroxyzine is helping but it makes her sleepy. Pt take it at night and the Buspar during the day.     Flu and PCV 20 given.    History of Present Illness  Past Medical History:   Diagnosis Date    Allergic     Allergy, unspecified, initial encounter     Anemia     Ankle sprain     Arthritis     Arthritis of back 2002    Arthritis of neck 2002    Asthma     Cervical disc disorder     Colitis, ulcerative 2002    Colon polyp     Depression     Diabetes mellitus type 2 in nonobese 03/06/2018    Fracture of wrist     2nd grade    Fracture, finger 2018    Fracture, foot     Limb swelling     Low back pain     Obesity 03/06/2018    Pain in both knees 03/20/2018    UNSPECIFIED CHRONICITY    Psoriasis 07/30/2019    Reflux esophagitis     Seasonal allergies     Sinusitis, chronic     Skin disorder     SOB (shortness of breath)     Tendonitis 03/20/2018    INSERTIONAL PATELLAR TENDONITIS, BILATERAL    Thoracic disc disorder     Wrist sprain       Family History   Problem Relation Age of Onset    Diabetes Mother         UNSPECIFIED TYPE    Arthritis Mother     Osteoporosis Mother     Anesthesia problems Mother         Pseudocholinesterase    Broken bones Mother     Kidney disease Mother     Stroke Mother     Stroke Sister     Diabetes Sister         UNSPECIFIED TYPE    Arthritis Sister     Osteoporosis Sister     Breast cancer Maternal Grandmother         MALIGNANT    Heart disease Maternal Grandmother     Cancer Maternal Grandmother         Breast    Hearing loss Maternal Grandmother     Kidney disease Maternal Grandmother     Stroke Maternal Grandfather     Heart disease Maternal Grandfather     Breast cancer Paternal Grandmother          MALIGNANT    Cancer Paternal Grandmother     Cancer Paternal Grandfather         UNSPECIFIED    COPD Other     Emphysema Other     Diabetes Other     Diabetes Sister     Liver disease Sister     Miscarriages / Stillbirths Sister       Past Surgical History:   Procedure Laterality Date    CERVICAL POLYPECTOMY  2001    COLONOSCOPY  07/13/2018    REPEAT IN 3 YEARS (ELIZABETH)    DIAGNOSTIC LAPAROSCOPY      ENDOSCOPY  07/31/2018    POLYPECTOMY  2002    STOMACH POLYP REMOVED    WISDOM TOOTH EXTRACTION  1997        Current Outpatient Medications:     azelastine (ASTELIN) 0.1 % nasal spray, 2 sprays into the nostril(s) as directed by provider 2 (Two) Times a Day., Disp: , Rfl:     busPIRone (BUSPAR) 10 MG tablet, TAKE ONE TABLET BY MOUTH THREE TIMES A DAY, Disp: 60 tablet, Rfl: 0    cyclobenzaprine (FLEXERIL) 10 MG tablet, TAKE ONE TABLET BY MOUTH EVERY NIGHT AT BEDTIME IF NEEDED FOR MUSCLE SPASM, Disp: 30 tablet, Rfl: 0    Dulaglutide (Trulicity) 1.5 MG/0.5ML solution pen-injector, Inject 1.5 mg under the skin into the appropriate area as directed Every 7 (Seven) Days., Disp: 2 mL, Rfl: 2    EPINEPHrine (EPIPEN) 0.3 MG/0.3ML solution auto-injector injection, Inject 0.3 mL into the appropriate muscle as directed by prescriber 1 (One) Time., Disp: , Rfl:     fluticasone (FLONASE) 50 MCG/ACT nasal spray, SPRAY 1 SPRAY INTO EACH NOSTRIL EVERY DAY, Disp: 16 g, Rfl: 0    glucose blood (FREESTYLE LITE) test strip, USE AS DIRECTED TO CHECK BLOOD SUGAR TWO TIMES A DAY, Disp: 100 each, Rfl: 5    hydrOXYzine (ATARAX) 25 MG tablet, Take 1 tablet by mouth 3 (Three) Times a Day As Needed for Anxiety., Disp: 30 tablet, Rfl: 0    ibuprofen (ADVIL,MOTRIN) 800 MG tablet, Take 1 tablet by mouth Every 6 (Six) Hours As Needed for Mild Pain., Disp: 30 tablet, Rfl: 0    Jardiance 10 MG tablet tablet, TAKE 1 TABLET BY MOUTH DAILY., Disp: 30 tablet, Rfl: 5    ketoconazole (NIZORAL) 2 % shampoo, APPLY TOPICALLY TO THE AFFECTED AREA ONCE  "WEEKLY, Disp: 120 mL, Rfl: 2    Lancets (freestyle) lancets, USE TO TEST BLOOD SUGAR TWO TIMES A DAY, Disp: 100 each, Rfl: 5    levocetirizine (XYZAL) 5 MG tablet, Take 1 tablet by mouth Daily., Disp: , Rfl:     loratadine (CLARITIN) 10 MG tablet, Claritin 10 mg oral tablet take 1 tablet (10 mg) by oral route once daily   Active, Disp: , Rfl:     meloxicam (MOBIC) 7.5 MG tablet, TAKE ONE TABLET BY MOUTH EVERY DAY, Disp: 30 tablet, Rfl: 2    montelukast (SINGULAIR) 10 MG tablet, TAKE ONE TABLET BY MOUTH EVERY EVENING, Disp: 30 tablet, Rfl: 5    norgestimate-ethinyl estradiol (Tri-Estarylla) 0.18/0.215/0.25 MG-35 MCG per tablet, TAKE 1 TABLET BY ORAL ROUTE ONCE DAILY, Disp: 28 tablet, Rfl: 3    Omeprazole (PRILOSEC PO), Take 1 tablet by mouth Daily As Needed., Disp: , Rfl:     ProAir  (90 Base) MCG/ACT inhaler, Inhale 2 puffs Every 4 (Four) Hours As Needed for Wheezing., Disp: 8.5 g, Rfl: 5    cephalexin (Keflex) 500 MG capsule, Take 1 capsule by mouth 2 (Two) Times a Day. (Patient not taking: Reported on 7/27/2023), Disp: 20 capsule, Rfl: 0    Continuous Blood Gluc Sensor (FreeStyle Terrie 14 Day Sensor) misc, 1 each Every 14 (Fourteen) Days. (Patient not taking: Reported on 9/27/2023), Disp: 2 each, Rfl: 11    ergocalciferol (ERGOCALCIFEROL) 1.25 MG (04131 UT) capsule, Take 1 capsule by mouth 2 (Two) Times a Week. (Patient not taking: Reported on 9/27/2023), Disp: 26 capsule, Rfl: 1    sertraline (Zoloft) 50 MG tablet, Take 1 tablet by mouth Daily., Disp: 30 tablet, Rfl: 1    OBJECTIVE  Vital Signs:   BP (!) 98/30   Pulse 98   Ht 160 cm (63\")   Wt 104 kg (229 lb)   LMP 09/20/2023   SpO2 100%   BMI 40.57 kg/mý    Estimated body mass index is 40.57 kg/mý as calculated from the following:    Height as of this encounter: 160 cm (63\").    Weight as of this encounter: 104 kg (229 lb).     Wt Readings from Last 3 Encounters:   10/11/23 104 kg (229 lb)   09/27/23 102 kg (224 lb 3.2 oz)   07/27/23 108 kg (239 lb) "     BP Readings from Last 3 Encounters:   10/11/23 (!) 98/30   09/27/23 109/78   07/27/23 109/64       Physical Exam  Vitals reviewed.   Constitutional:       Appearance: Normal appearance. She is well-developed.   HENT:      Head: Normocephalic and atraumatic.      Right Ear: External ear normal.      Left Ear: External ear normal.   Eyes:      Conjunctiva/sclera: Conjunctivae normal.      Pupils: Pupils are equal, round, and reactive to light.   Cardiovascular:      Rate and Rhythm: Normal rate and regular rhythm.      Heart sounds: No murmur heard.     No friction rub. No gallop.   Pulmonary:      Effort: Pulmonary effort is normal.      Breath sounds: Normal breath sounds. No wheezing or rhonchi.   Skin:     General: Skin is warm and dry.   Neurological:      Mental Status: She is alert and oriented to person, place, and time.      Cranial Nerves: No cranial nerve deficit.   Psychiatric:         Mood and Affect: Mood and affect normal.         Behavior: Behavior normal.         Thought Content: Thought content normal.         Judgment: Judgment normal.          Result Review    CMP          12/13/2022    11:23 4/25/2023    13:21 8/9/2023    11:10   CMP   Glucose 115  95  157    BUN 8  10  7    Creatinine 0.78  0.91  0.90    EGFR 96.8  80.4  81.5    Sodium 136  138  138    Potassium 4.2  4.5  4.0    Chloride 103  103  103    Calcium 8.9  9.9  9.8    Total Protein 5.9  7.6  7.2    Albumin 4.10  4.3  4.2    Globulin 1.8  3.3  3.0    Total Bilirubin 0.4  0.4  0.6    Alkaline Phosphatase 77  79  83    AST (SGOT) 17  22  21    ALT (SGPT) 22  22  18    Albumin/Globulin Ratio 2.3  1.3  1.4    BUN/Creatinine Ratio 10.3  11.0  7.8    Anion Gap 9.0  10.9  12.4      CBC          12/13/2022    11:23 3/27/2023    15:48 8/9/2023    11:10   CBC   WBC 11.14  10.24  9.79    RBC 4.49  4.39  4.69    Hemoglobin 14.2  14.2  15.1    Hematocrit 41.6  41.2  43.6    MCV 92.7  93.8  93.0    MCH 31.6  32.3  32.2    MCHC 34.1  34.5  34.6     RDW 12.3  12.1  12.6    Platelets 369  343  372      Lipid Panel          12/13/2022    11:23 8/9/2023    11:10   Lipid Panel   Total Cholesterol 159  198    Triglycerides 94  135    HDL Cholesterol 57  51    VLDL Cholesterol 17  24    LDL Cholesterol  85  123    LDL/HDL Ratio 1.46  2.35      TSH          12/13/2022    11:23 8/9/2023    11:10   TSH   TSH 2.170  1.410      Most Recent A1C          8/9/2023    11:10   HGBA1C Most Recent   Hemoglobin A1C 6.50      Lab Results   Component Value Date    OMVV79QR 33.8 03/27/2023        Lab Results   Component Value Date    FREET4 1.1 03/11/2021          No Images in the past 120 days found..     The above data has been reviewed by NIKOLE Odom 10/11/2023 10:26 EDT.          Patient Care Team:  Nicolle Mcmahon APRN as PCP - General (Nurse Practitioner)            ASSESSMENT & PLAN    Diagnoses and all orders for this visit:    1. Anxiety and depression (Primary)  Assessment & Plan:  Pt states she is sleeping much better with the hydroxyzine and having less anxiety, her friend has moved in with her, states she is doing much better, but still wants to try a different antidepressant, after discussion we decided to trial zoloft, SE and admin discussed, f/u 1 month, referral still pending, we are looking into why. Pt denies any SI     Orders:  -     sertraline (Zoloft) 50 MG tablet; Take 1 tablet by mouth Daily.  Dispense: 30 tablet; Refill: 1    2. Need for Streptococcus pneumoniae and influenza vaccination  -     Fluzone >6 Months (7323-4230)  -     Pneumococcal Conjugate Vaccine 20-Valent (PCV20)         Tobacco Use: Medium Risk (10/11/2023)    Patient History     Smoking Tobacco Use: Former     Smokeless Tobacco Use: Never     Passive Exposure: Not on file       Follow Up     Return in about 4 weeks (around 11/8/2023), or if symptoms worsen or fail to improve.        Patient was given instructions and counseling regarding her condition or for health  maintenance advice. Please see specific information pulled into the AVS if appropriate.   I have reviewed information obtained and documented by others and I have confirmed the accuracy of this documented note.    NIKOLE Odom

## 2023-10-11 NOTE — ASSESSMENT & PLAN NOTE
Pt states she is sleeping much better with the hydroxyzine and having less anxiety, her friend has moved in with her, states she is doing much better, but still wants to try a different antidepressant, after discussion we decided to trial zoloft,  and admin discussed, f/u 1 month, referral still pending, we are looking into why. Pt denies any SI

## 2023-11-13 ENCOUNTER — OFFICE VISIT (OUTPATIENT)
Dept: FAMILY MEDICINE CLINIC | Facility: CLINIC | Age: 43
End: 2023-11-13
Payer: COMMERCIAL

## 2023-11-13 ENCOUNTER — HOSPITAL ENCOUNTER (OUTPATIENT)
Dept: MAMMOGRAPHY | Facility: HOSPITAL | Age: 43
Discharge: HOME | End: 2023-11-13
Admitting: NURSE PRACTITIONER
Payer: COMMERCIAL

## 2023-11-13 VITALS
HEIGHT: 63 IN | DIASTOLIC BLOOD PRESSURE: 70 MMHG | SYSTOLIC BLOOD PRESSURE: 108 MMHG | WEIGHT: 233 LBS | OXYGEN SATURATION: 97 % | HEART RATE: 94 BPM | BODY MASS INDEX: 41.29 KG/M2

## 2023-11-13 DIAGNOSIS — E55.9 VITAMIN D DEFICIENCY: ICD-10-CM

## 2023-11-13 DIAGNOSIS — E11.65 TYPE 2 DIABETES MELLITUS WITH HYPERGLYCEMIA, WITHOUT LONG-TERM CURRENT USE OF INSULIN: Primary | ICD-10-CM

## 2023-11-13 DIAGNOSIS — F32.A ANXIETY AND DEPRESSION: ICD-10-CM

## 2023-11-13 DIAGNOSIS — F41.9 ANXIETY AND DEPRESSION: ICD-10-CM

## 2023-11-13 DIAGNOSIS — E66.01 CLASS 3 SEVERE OBESITY DUE TO EXCESS CALORIES WITH SERIOUS COMORBIDITY AND BODY MASS INDEX (BMI) OF 40.0 TO 44.9 IN ADULT: ICD-10-CM

## 2023-11-13 DIAGNOSIS — Z12.31 BREAST CANCER SCREENING BY MAMMOGRAM: ICD-10-CM

## 2023-11-13 PROCEDURE — 99214 OFFICE O/P EST MOD 30 MIN: CPT | Performed by: NURSE PRACTITIONER

## 2023-11-13 PROCEDURE — 77063 BREAST TOMOSYNTHESIS BI: CPT

## 2023-11-13 PROCEDURE — 77067 SCR MAMMO BI INCL CAD: CPT

## 2023-11-13 RX ORDER — DULAGLUTIDE 3 MG/.5ML
3 INJECTION, SOLUTION SUBCUTANEOUS WEEKLY
Qty: 6 ML | Refills: 1 | Status: SHIPPED | OUTPATIENT
Start: 2023-11-13 | End: 2023-11-13 | Stop reason: SDUPTHER

## 2023-11-13 RX ORDER — DULAGLUTIDE 3 MG/.5ML
3 INJECTION, SOLUTION SUBCUTANEOUS WEEKLY
Qty: 6 ML | Refills: 1 | Status: SHIPPED | OUTPATIENT
Start: 2023-11-13

## 2023-11-13 RX ORDER — CITALOPRAM 20 MG/1
20 TABLET ORAL DAILY
Qty: 90 TABLET | Refills: 1 | Status: SHIPPED | OUTPATIENT
Start: 2023-11-13

## 2023-11-13 NOTE — ASSESSMENT & PLAN NOTE
Patient's (Body mass index is 41.27 kg/m².) indicates that they are morbidly/severely obese (BMI > 40 or > 35 with obesity - related health condition) with health conditions that include diabetes mellitus . Weight is worsening. BMI  is above average; BMI management plan is completed. We discussed portion control and increasing exercise.

## 2023-11-13 NOTE — ASSESSMENT & PLAN NOTE
Unable to tolerate Zoloft due to gastrointestinal side effects including diarrhea, we have decided to switch back to Celexa, patient reports that she is in a much better place than she was previously, has continued to spend a lot of time with family and friends and is doing much better.  We will follow back up in 2 months

## 2023-11-13 NOTE — PROGRESS NOTES
Chief Complaint  Anxiety and Depression    SUBJECTIVE  Mary Go presents to [facility]     Pt here today for one month follow up on anxiety and depression. Pt states she was not able to tolerate the Zoloft, it caused diarrhea. Pt would like to go back to [facility].     History of Present Illness  Past Medical History:   Diagnosis Date    Allergic     Allergy, unspecified, initial encounter     Anemia     Ankle sprain     Arthritis     Arthritis of back 2002    Arthritis of neck 2002    Asthma     Cervical disc disorder     Colitis, ulcerative 2002    Colon polyp     Depression     Diabetes mellitus type 2 in nonobese 03/06/2018    Fracture of wrist     2nd grade    Fracture, finger 2018    Fracture, foot     Limb swelling     Low back pain     Obesity 03/06/2018    Pain in both knees 03/20/2018    UNSPECIFIED CHRONICITY    Psoriasis 07/30/2019    Reflux esophagitis     Seasonal allergies     Sinusitis, chronic     Skin disorder     SOB (shortness of breath)     Tendonitis 03/20/2018    INSERTIONAL PATELLAR TENDONITIS, BILATERAL    Thoracic disc disorder     Wrist sprain       Family History   Problem Relation Age of Onset    Diabetes Mother         UNSPECIFIED TYPE    Arthritis Mother     Osteoporosis Mother     Anesthesia problems Mother         Pseudocholinesterase    Broken bones Mother     Kidney disease Mother     Stroke Mother     Stroke Sister     Diabetes Sister         UNSPECIFIED TYPE    Arthritis Sister     Osteoporosis Sister     Breast cancer Maternal Grandmother         MALIGNANT    Heart disease Maternal Grandmother     Cancer Maternal Grandmother         Breast    Hearing loss Maternal Grandmother     Kidney disease Maternal Grandmother     Stroke Maternal Grandfather     Heart disease Maternal Grandfather     Breast cancer Paternal Grandmother         MALIGNANT    Cancer Paternal Grandmother     Cancer Paternal Grandfather         UNSPECIFIED    COPD Other      Emphysema Other     Diabetes Other     Diabetes Sister     Liver disease Sister     Miscarriages / Stillbirths Sister       Past Surgical History:   Procedure Laterality Date    CERVICAL POLYPECTOMY  2001    COLONOSCOPY  07/13/2018    REPEAT IN 3 YEARS (ELIZABETH)    DIAGNOSTIC LAPAROSCOPY      ENDOSCOPY  07/31/2018    POLYPECTOMY  2002    STOMACH POLYP REMOVED    WISDOM TOOTH EXTRACTION  1997        Current Outpatient Medications:     azelastine (ASTELIN) 0.1 % nasal spray, 2 sprays into the nostril(s) as directed by provider 2 (Two) Times a Day., Disp: , Rfl:     busPIRone (BUSPAR) 10 MG tablet, TAKE ONE TABLET BY MOUTH THREE TIMES A DAY, Disp: 60 tablet, Rfl: 0    cyclobenzaprine (FLEXERIL) 10 MG tablet, TAKE ONE TABLET BY MOUTH EVERY NIGHT AT BEDTIME IF NEEDED FOR MUSCLE SPASM, Disp: 30 tablet, Rfl: 0    Dulaglutide (Trulicity) 3 MG/0.5ML solution pen-injector, Inject 0.5 mL under the skin into the appropriate area as directed 1 (One) Time Per Week., Disp: 6 mL, Rfl: 1    EPINEPHrine (EPIPEN) 0.3 MG/0.3ML solution auto-injector injection, Inject 0.3 mL into the appropriate muscle as directed by prescriber 1 (One) Time., Disp: , Rfl:     fluticasone (FLONASE) 50 MCG/ACT nasal spray, SPRAY 1 SPRAY INTO EACH NOSTRIL EVERY DAY, Disp: 16 g, Rfl: 0    glucose blood (FREESTYLE LITE) test strip, USE AS DIRECTED TO CHECK BLOOD SUGAR TWO TIMES A DAY, Disp: 100 each, Rfl: 5    hydrOXYzine (ATARAX) 25 MG tablet, Take 1 tablet by mouth 3 (Three) Times a Day As Needed for Anxiety., Disp: 30 tablet, Rfl: 0    ibuprofen (ADVIL,MOTRIN) 800 MG tablet, Take 1 tablet by mouth Every 6 (Six) Hours As Needed for Mild Pain., Disp: 30 tablet, Rfl: 0    Jardiance 10 MG tablet tablet, TAKE 1 TABLET BY MOUTH DAILY., Disp: 30 tablet, Rfl: 5    ketoconazole (NIZORAL) 2 % shampoo, APPLY TOPICALLY TO THE AFFECTED AREA ONCE WEEKLY, Disp: 120 mL, Rfl: 2    Lancets (freestyle) lancets, USE TO TEST BLOOD SUGAR TWO TIMES A DAY, Disp: 100 each, Rfl:  "5    levocetirizine (XYZAL) 5 MG tablet, Take 1 tablet by mouth Daily., Disp: , Rfl:     loratadine (CLARITIN) 10 MG tablet, Claritin 10 mg oral tablet take 1 tablet (10 mg) by oral route once daily   Active, Disp: , Rfl:     meloxicam (MOBIC) 7.5 MG tablet, TAKE ONE TABLET BY MOUTH EVERY DAY, Disp: 30 tablet, Rfl: 2    montelukast (SINGULAIR) 10 MG tablet, TAKE ONE TABLET BY MOUTH EVERY EVENING, Disp: 30 tablet, Rfl: 5    norgestimate-ethinyl estradiol (Tri-Estarylla) 0.18/0.215/0.25 MG-35 MCG per tablet, TAKE 1 TABLET BY ORAL ROUTE ONCE DAILY, Disp: 28 tablet, Rfl: 3    Omeprazole (PRILOSEC PO), Take 1 tablet by mouth Daily As Needed., Disp: , Rfl:     ProAir  (90 Base) MCG/ACT inhaler, Inhale 2 puffs Every 4 (Four) Hours As Needed for Wheezing., Disp: 8.5 g, Rfl: 5    cephalexin (Keflex) 500 MG capsule, Take 1 capsule by mouth 2 (Two) Times a Day. (Patient not taking: Reported on 7/27/2023), Disp: 20 capsule, Rfl: 0    citalopram (CeleXA) 20 MG tablet, Take 1 tablet by mouth Daily., Disp: 90 tablet, Rfl: 1    Continuous Blood Gluc Sensor (FreeStyle Terrie 14 Day Sensor) misc, 1 each Every 14 (Fourteen) Days. (Patient not taking: Reported on 11/13/2023), Disp: 2 each, Rfl: 11    ergocalciferol (ERGOCALCIFEROL) 1.25 MG (21835 UT) capsule, Take 1 capsule by mouth 2 (Two) Times a Week. (Patient not taking: Reported on 9/27/2023), Disp: 26 capsule, Rfl: 1    OBJECTIVE  Vital Signs:   /70   Pulse 94   Ht 160 cm (63\")   Wt 106 kg (233 lb)   SpO2 97%   BMI 41.27 kg/m²    Estimated body mass index is 41.27 kg/m² as calculated from the following:    Height as of this encounter: 160 cm (63\").    Weight as of this encounter: 106 kg (233 lb).     Wt Readings from Last 3 Encounters:   11/13/23 106 kg (233 lb)   10/11/23 104 kg (229 lb)   09/27/23 102 kg (224 lb 3.2 oz)     BP Readings from Last 3 Encounters:   11/13/23 108/70   10/11/23 (!) 98/30   09/27/23 109/78       Physical Exam  Vitals reviewed. "   Constitutional:       Appearance: Normal appearance. She is well-developed.   HENT:      Head: Normocephalic and atraumatic.      Right Ear: External ear normal.      Left Ear: External ear normal.   Eyes:      Conjunctiva/sclera: Conjunctivae normal.      Pupils: Pupils are equal, round, and reactive to light.   Cardiovascular:      Rate and Rhythm: Normal rate and regular rhythm.      Heart sounds: No murmur heard.     No friction rub. No gallop.   Pulmonary:      Effort: Pulmonary effort is normal.      Breath sounds: Normal breath sounds. No wheezing or rhonchi.   Skin:     General: Skin is warm and dry.   Neurological:      Mental Status: She is alert and oriented to person, place, and time.      Cranial Nerves: No cranial nerve deficit.   Psychiatric:         Mood and Affect: Mood and affect normal.         Behavior: Behavior normal.         Thought Content: Thought content normal.         Judgment: Judgment normal.          Result Review    CMP          12/13/2022    11:23 4/25/2023    13:21 8/9/2023    11:10   CMP   Glucose 115  95  157    BUN 8  10  7    Creatinine 0.78  0.91  0.90    EGFR 96.8  80.4  81.5    Sodium 136  138  138    Potassium 4.2  4.5  4.0    Chloride 103  103  103    Calcium 8.9  9.9  9.8    Total Protein 5.9  7.6  7.2    Albumin 4.10  4.3  4.2    Globulin 1.8  3.3  3.0    Total Bilirubin 0.4  0.4  0.6    Alkaline Phosphatase 77  79  83    AST (SGOT) 17  22  21    ALT (SGPT) 22  22  18    Albumin/Globulin Ratio 2.3  1.3  1.4    BUN/Creatinine Ratio 10.3  11.0  7.8    Anion Gap 9.0  10.9  12.4      CBC          12/13/2022    11:23 3/27/2023    15:48 8/9/2023    11:10   CBC   WBC 11.14  10.24  9.79    RBC 4.49  4.39  4.69    Hemoglobin 14.2  14.2  15.1    Hematocrit 41.6  41.2  43.6    MCV 92.7  93.8  93.0    MCH 31.6  32.3  32.2    MCHC 34.1  34.5  34.6    RDW 12.3  12.1  12.6    Platelets 369  343  372      Lipid Panel          12/13/2022    11:23 8/9/2023    11:10   Lipid Panel   Total  Cholesterol 159  198    Triglycerides 94  135    HDL Cholesterol 57  51    VLDL Cholesterol 17  24    LDL Cholesterol  85  123    LDL/HDL Ratio 1.46  2.35      TSH          12/13/2022    11:23 8/9/2023    11:10   TSH   TSH 2.170  1.410      Most Recent A1C          8/9/2023    11:10   HGBA1C Most Recent   Hemoglobin A1C 6.50        Lab Results   Component Value Date    OTEP31HU 33.8 03/27/2023        Lab Results   Component Value Date    FREET4 1.1 03/11/2021          No Images in the past 120 days found..     The above data has been reviewed by NIKOLE Odom 11/13/2023 10:47 EST.          Patient Care Team:  Nicolle Mcmahon APRN as PCP - General (Nurse Practitioner)            ASSESSMENT & PLAN    Diagnoses and all orders for this visit:    1. Type 2 diabetes mellitus with hyperglycemia, without long-term current use of insulin (Primary)  Assessment & Plan:  Patient tolerating Trulicity well, but reports that her blood sugar has been running higher lately, also admits to poor dietary choices, we will go ahead and increase her Trulicity to 3 mg, discussed diet and exercise changes.  We will recheck labs today.    Orders:  -     Comprehensive Metabolic Panel; Future  -     Lipid Panel; Future  -     Hemoglobin A1c; Future  -     Microalbumin / Creatinine Urine Ratio - Urine, Clean Catch; Future  -     Dulaglutide (Trulicity) 3 MG/0.5ML solution pen-injector; Inject 0.5 mL under the skin into the appropriate area as directed 1 (One) Time Per Week.  Dispense: 6 mL; Refill: 1    2. Anxiety and depression  Assessment & Plan:  Unable to tolerate Zoloft due to gastrointestinal side effects including diarrhea, we have decided to switch back to Celexa, patient reports that she is in a much better place than she was previously, has continued to spend a lot of time with family and friends and is doing much better.  We will follow back up in 2 months    Orders:  -     citalopram (CeleXA) 20 MG tablet; Take 1  tablet by mouth Daily.  Dispense: 90 tablet; Refill: 1    3. Vitamin D deficiency  Overview:  Well controlled with Vit D supplementation , cont current dose     Orders:  -     Vitamin D 25 hydroxy; Future    4. Class 3 severe obesity due to excess calories with serious comorbidity and body mass index (BMI) of 40.0 to 44.9 in adult  Overview:  Cont to work on diet and exercise     Assessment & Plan:  Patient's (Body mass index is 41.27 kg/m².) indicates that they are morbidly/severely obese (BMI > 40 or > 35 with obesity - related health condition) with health conditions that include diabetes mellitus . Weight is worsening. BMI  is above average; BMI management plan is completed. We discussed portion control and increasing exercise.       Other orders  -     Discontinue: Dulaglutide (Trulicity) 3 MG/0.5ML solution pen-injector; Inject 0.5 mL under the skin into the appropriate area as directed 1 (One) Time Per Week.  Dispense: 6 mL; Refill: 1         Tobacco Use: Medium Risk (11/13/2023)    Patient History     Smoking Tobacco Use: Former     Smokeless Tobacco Use: Never     Passive Exposure: Not on file       Follow Up     Return in about 3 months (around 2/13/2024), or if symptoms worsen or fail to improve.        Patient was given instructions and counseling regarding her condition or for health maintenance advice. Please see specific information pulled into the AVS if appropriate.   I have reviewed information obtained and documented by others and I have confirmed the accuracy of this documented note.    NIKOLE Odom

## 2023-11-13 NOTE — ASSESSMENT & PLAN NOTE
Patient tolerating Trulicity well, but reports that her blood sugar has been running higher lately, also admits to poor dietary choices, we will go ahead and increase her Trulicity to 3 mg, discussed diet and exercise changes.  We will recheck labs today.

## 2023-11-14 ENCOUNTER — LAB (OUTPATIENT)
Dept: LAB | Facility: HOSPITAL | Age: 43
End: 2023-11-14
Payer: COMMERCIAL

## 2023-11-14 DIAGNOSIS — E11.65 TYPE 2 DIABETES MELLITUS WITH HYPERGLYCEMIA, WITHOUT LONG-TERM CURRENT USE OF INSULIN: ICD-10-CM

## 2023-11-14 DIAGNOSIS — E55.9 VITAMIN D DEFICIENCY: ICD-10-CM

## 2023-11-14 LAB
25(OH)D3 SERPL-MCNC: 22.8 NG/ML (ref 30–100)
ALBUMIN SERPL-MCNC: 4.2 G/DL (ref 3.5–5.2)
ALBUMIN UR-MCNC: 3.7 MG/DL
ALBUMIN/GLOB SERPL: 1.4 G/DL
ALP SERPL-CCNC: 85 U/L (ref 39–117)
ALT SERPL W P-5'-P-CCNC: 17 U/L (ref 1–33)
ANION GAP SERPL CALCULATED.3IONS-SCNC: 10.1 MMOL/L (ref 5–15)
AST SERPL-CCNC: 19 U/L (ref 1–32)
BILIRUB SERPL-MCNC: 0.3 MG/DL (ref 0–1.2)
BUN SERPL-MCNC: 9 MG/DL (ref 6–20)
BUN/CREAT SERPL: 10.7 (ref 7–25)
CALCIUM SPEC-SCNC: 9 MG/DL (ref 8.6–10.5)
CHLORIDE SERPL-SCNC: 108 MMOL/L (ref 98–107)
CHOLEST SERPL-MCNC: 151 MG/DL (ref 0–200)
CO2 SERPL-SCNC: 20.9 MMOL/L (ref 22–29)
CREAT SERPL-MCNC: 0.84 MG/DL (ref 0.57–1)
CREAT UR-MCNC: 313.4 MG/DL
EGFRCR SERPLBLD CKD-EPI 2021: 88 ML/MIN/1.73
GLOBULIN UR ELPH-MCNC: 3 GM/DL
GLUCOSE SERPL-MCNC: 109 MG/DL (ref 65–99)
HBA1C MFR BLD: 6.3 % (ref 4.8–5.6)
HDLC SERPL-MCNC: 39 MG/DL (ref 40–60)
LDLC SERPL CALC-MCNC: 89 MG/DL (ref 0–100)
LDLC/HDLC SERPL: 2.2 {RATIO}
MICROALBUMIN/CREAT UR: 11.8 MG/G (ref 0–29)
POTASSIUM SERPL-SCNC: 4.2 MMOL/L (ref 3.5–5.2)
PROT SERPL-MCNC: 7.2 G/DL (ref 6–8.5)
SODIUM SERPL-SCNC: 139 MMOL/L (ref 136–145)
TRIGL SERPL-MCNC: 131 MG/DL (ref 0–150)
VLDLC SERPL-MCNC: 23 MG/DL (ref 5–40)

## 2023-11-14 PROCEDURE — 82570 ASSAY OF URINE CREATININE: CPT

## 2023-11-14 PROCEDURE — 83036 HEMOGLOBIN GLYCOSYLATED A1C: CPT

## 2023-11-14 PROCEDURE — 36415 COLL VENOUS BLD VENIPUNCTURE: CPT

## 2023-11-14 PROCEDURE — 80061 LIPID PANEL: CPT

## 2023-11-14 PROCEDURE — 80053 COMPREHEN METABOLIC PANEL: CPT

## 2023-11-14 PROCEDURE — 82043 UR ALBUMIN QUANTITATIVE: CPT

## 2023-11-14 PROCEDURE — 82306 VITAMIN D 25 HYDROXY: CPT

## 2023-11-15 DIAGNOSIS — E55.9 VITAMIN D DEFICIENCY: ICD-10-CM

## 2023-11-15 RX ORDER — TIRZEPATIDE 5 MG/.5ML
5 INJECTION, SOLUTION SUBCUTANEOUS
Qty: 2 ML | Refills: 3 | OUTPATIENT
Start: 2023-11-15

## 2023-11-15 RX ORDER — BACLOFEN 10 MG/1
10 TABLET ORAL 2 TIMES DAILY
Qty: 60 TABLET | Refills: 2 | OUTPATIENT
Start: 2023-11-15

## 2023-11-15 RX ORDER — ERGOCALCIFEROL 1.25 MG/1
50000 CAPSULE ORAL WEEKLY
Qty: 13 CAPSULE | Refills: 1 | Status: SHIPPED | OUTPATIENT
Start: 2023-11-15

## 2023-11-15 RX ORDER — ERGOCALCIFEROL 1.25 MG/1
CAPSULE ORAL
Qty: 4 CAPSULE | Refills: 1 | OUTPATIENT
Start: 2023-11-15

## 2023-11-15 RX ORDER — KETOCONAZOLE 20 MG/ML
SHAMPOO TOPICAL
Qty: 120 ML | Refills: 2 | OUTPATIENT
Start: 2023-11-15

## 2023-11-15 RX ORDER — MELOXICAM 7.5 MG/1
TABLET ORAL
Qty: 30 TABLET | Refills: 2 | OUTPATIENT
Start: 2023-11-15

## 2023-11-15 RX ORDER — ALBUTEROL SULFATE 90 UG/1
AEROSOL, METERED RESPIRATORY (INHALATION)
Qty: 18 G | Refills: 3 | OUTPATIENT
Start: 2023-11-15

## 2024-01-09 ENCOUNTER — OFFICE VISIT (OUTPATIENT)
Dept: BEHAVIORAL HEALTH | Facility: CLINIC | Age: 44
End: 2024-01-09
Payer: COMMERCIAL

## 2024-01-09 VITALS
DIASTOLIC BLOOD PRESSURE: 79 MMHG | BODY MASS INDEX: 40.57 KG/M2 | SYSTOLIC BLOOD PRESSURE: 108 MMHG | HEIGHT: 63 IN | HEART RATE: 106 BPM | WEIGHT: 229 LBS

## 2024-01-09 DIAGNOSIS — F33.0 MILD EPISODE OF RECURRENT MAJOR DEPRESSIVE DISORDER: ICD-10-CM

## 2024-01-09 DIAGNOSIS — F41.1 GENERALIZED ANXIETY DISORDER: Primary | ICD-10-CM

## 2024-01-09 DIAGNOSIS — G47.00 INSOMNIA, UNSPECIFIED TYPE: ICD-10-CM

## 2024-01-09 DIAGNOSIS — F43.10 POST TRAUMATIC STRESS DISORDER (PTSD): ICD-10-CM

## 2024-01-09 PROCEDURE — 90792 PSYCH DIAG EVAL W/MED SRVCS: CPT | Performed by: PHYSICIAN ASSISTANT

## 2024-01-09 RX ORDER — TRAZODONE HYDROCHLORIDE 50 MG/1
TABLET ORAL
Qty: 60 TABLET | Refills: 1 | Status: SHIPPED | OUTPATIENT
Start: 2024-01-09

## 2024-01-09 RX ORDER — CITALOPRAM 20 MG/1
30 TABLET ORAL DAILY
Qty: 45 TABLET | Refills: 1 | Status: SHIPPED | OUTPATIENT
Start: 2024-01-09 | End: 2024-02-08

## 2024-01-09 NOTE — PROGRESS NOTES
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 Tomosynthesis Bilateral With CAD    Result Date: 11/13/2023   Benign mammogram. Suggest routine mammographic screening.  RECOMMENDATION(S):  ROUTINE MAMMOGRAM AND CLINICAL EVALUATION IN 12 MONTHS.   BIRADS:  DIAGNOSTIC CATEGORY 2--BENIGN FINDING   BREAST COMPOSITION: Almost entirely fatty.  PLEASE NOTE:  A NORMAL MAMMOGRAM DOES NOT EXCLUDE THE POSSIBILITY OF BREAST CANCER. ANY CLINICALLY SUSPICIOUS PALPABLE LUMP SHOULD BE BIOPSIED.      ROCIO RICHARDS MD       Electronically Signed and Approved By: ROCIO RICHARDS MD on 11/13/2023 at 15:29                 Assessment & Plan   Diagnoses and all orders for this visit:    1. Generalized anxiety disorder (Primary)  -     Ambulatory Referral to Psychotherapy  -     citalopram (CeleXA) 20 MG tablet; Take 1.5 tablets by mouth Daily for 30 days.  Dispense: 45 tablet; Refill: 1    2. Mild episode of recurrent major depressive disorder  -     Ambulatory Referral to Psychotherapy  -     citalopram (CeleXA) 20 MG tablet; Take 1.5 tablets by mouth Daily for 30 days.  Dispense: 45 tablet; Refill: 1    3. Post traumatic stress disorder (PTSD)  -     Ambulatory Referral to Psychotherapy  -     citalopram (CeleXA) 20 MG tablet; Take 1.5 tablets by mouth Daily for 30 days.  Dispense: 45 tablet; Refill: 1    4. Insomnia, unspecified type  -     traZODone (DESYREL) 50 MG tablet; Take 0.5 to 2  tab PO QHS PRN sleep  Dispense: 60 tablet; Refill: 1      Patient screened positive for depression based on a PHQ-9 score of 11 on 1/9/2024. Follow-up recommendations include: Prescribed antidepressant medication treatment, Referral to Mental Health specialist, and Suicide Risk Assessment performed.    Presentation seems most consistent with DURAN, MDD, likely PTSD, insomnia.  Patient declined switching antidepressants.  We will increase Celexa for management depression, anxiety, and overall mood.  We will continue BuSpar for management depression, anxiety, and overall mood.  We will start on trazodone for sleep as needed.  We will refer for psychotherapy.  Instructed patient to contact the office for any new or worsening symptoms or any other concerns.  Follow-up in 1 month.  Addressed all questions and concerns.    Visit Diagnoses:    ICD-10-CM ICD-9-CM   1. Generalized anxiety disorder  F41.1 300.02   2. Mild episode of recurrent major depressive disorder  F33.0 296.31   3. Post traumatic stress disorder (PTSD)  F43.10 309.81   4. Insomnia, unspecified type  G47.00 780.52       PLAN:  Safety: No acute safety concerns at this time.  Therapy: We will refer for psychotherapy  Risk Assessment: Risk of self-harm acutely is moderate.  Risk factors include anxiety disorder, mood disorder, family history, and recent psychosocial stressors (pandemic). Protective factors include denies access to guns/weapons, no present SI, no history of suicide attempts or self-harm in the past, minimal AODA, healthcare seeking, future orientation, willingness to engage in care.  Risk of self-harm chronically is also moderate, but could be further elevated in the event of treatment noncompliance and/or AODA.  Labs/Diagnostics Ordered:   Orders Placed This Encounter   Procedures    Ambulatory Referral to Psychotherapy     Medications:   New Medications Ordered This Visit   Medications    citalopram (CeleXA) 20 MG tablet     Sig: Take 1.5  tablets by mouth Daily for 30 days.     Dispense:  45 tablet     Refill:  1    traZODone (DESYREL) 50 MG tablet     Sig: Take 0.5 to 2 tab PO QHS PRN sleep     Dispense:  60 tablet     Refill:  1     Discussed all risks, benefits, alternatives, and side effects of citalopram including but not limited to GI upset, sexual dysfunction, bleeding risk, seizure risk, insomnia, sedation, headache, activation of romina or hypomania, increased fragility fracture risk, hyponatremia, ocular effects, dose-dependent prolonged QT interval, withdrawal syndrome following abrupt discontinuation, serotonin syndrome, and activation of suicidal ideation and behavior.  Pt educated on the need to practice safe sex while taking this med. Discussed the need for pt to immediately call the office for any new or worsening symptoms, such as worsening depression; feeling nervous or restless; suicidal thoughts or actions; or other changes changes in mood or behavior, and all other concerns. Pt educated on med compliance and the risks of suddenly stopping this medication or missing doses. Pt verbalized understanding and is agreeable to taking citalopram. Addressed all questions and concerns.     Discussed all risks, benefits, alternatives, and side effects of Trazodone including but not limited to GI upset, sexual dysfunction, dizziness, headache, nervousness, bleeding risk, seizure risk, sedation, headache, activation of romina or hypomania, increased fragility fracture risk, cardiac arrhythmias, priapism, hyponatremia, ocular effects, prolonged QT interval, withdrawal syndrome following abrupt discontinuation, serotonin syndrome, and activation of suicidal ideation and behavior.  Pt educated on the need to practice safe sex while taking this med. Discussed the need for pt to immediately call the office for any new or worsening symptoms, such as worsening depression; feeling nervous or restless; suicidal thoughts or actions; or other changes  changes in mood or behavior, and all other concerns. Pt educated on med compliance and the risks of suddenly stopping this medication or missing doses. Pt verbalized understanding and is agreeable to taking Trazodone. Addressed all questions and concerns.     Discussed all risks, benefits, alternatives, and side effects of Buspirone including but not limited to GI upset, dizziness, sedation, HA, nervousness, restlessness, and serotonin syndrome.  Pt educated on the need to practice safe sex while taking this med. Discussed the need for pt to immediately call the office for any new or worsening symptoms, and all other concerns. Pt educated on med compliance. Pt verbalized understanding and is agreeable to taking Buspirone. Addressed all questions and concerns.     Follow up: F/u in 1 month.      TREATMENT PLAN/GOALS: Continue supportive psychotherapy efforts and medications as indicated. Treatment and medication options discussed during today's visit. Patient ackowledged and verbally consented to continue with current treatment plan and was educated on the importance of compliance with treatment and follow-up appointments.    MEDICATION ISSUES:  BEST reviewed as expected.  Discussed medication options and treatment plan of prescribed medication as well as the risks, benefits, and side effects including potential falls, possible impaired driving and metabolic adversities among others. Patient is agreeable to call the office with any worsening of symptoms or onset of side effects. Patient is agreeable to call 911 or go to the nearest ER should he/she begin having SI/HI. No medication side effects or related complaints today.            This document has been electronically signed by Jeanette Keita PA-C  January 9, 2024 16:38 EST      Part of this note may be an electronic transcription/translation of spoken language to printed text using the Dragon Dictation System.

## 2024-02-12 ENCOUNTER — TELEMEDICINE (OUTPATIENT)
Dept: BEHAVIORAL HEALTH | Facility: CLINIC | Age: 44
End: 2024-02-12
Payer: COMMERCIAL

## 2024-02-12 DIAGNOSIS — F51.05 INSOMNIA DUE TO OTHER MENTAL DISORDER: ICD-10-CM

## 2024-02-12 DIAGNOSIS — F33.0 MILD EPISODE OF RECURRENT MAJOR DEPRESSIVE DISORDER: ICD-10-CM

## 2024-02-12 DIAGNOSIS — F43.10 POST TRAUMATIC STRESS DISORDER (PTSD): ICD-10-CM

## 2024-02-12 DIAGNOSIS — F99 INSOMNIA DUE TO OTHER MENTAL DISORDER: ICD-10-CM

## 2024-02-12 DIAGNOSIS — F41.1 GENERALIZED ANXIETY DISORDER: Primary | ICD-10-CM

## 2024-02-12 PROCEDURE — 99214 OFFICE O/P EST MOD 30 MIN: CPT | Performed by: PHYSICIAN ASSISTANT

## 2024-02-12 RX ORDER — BUSPIRONE HYDROCHLORIDE 10 MG/1
10 TABLET ORAL 3 TIMES DAILY
Qty: 90 TABLET | Refills: 0 | Status: SHIPPED | OUTPATIENT
Start: 2024-02-12

## 2024-02-12 RX ORDER — CITALOPRAM 40 MG/1
40 TABLET ORAL DAILY
Qty: 90 TABLET | Refills: 0 | Status: SHIPPED | OUTPATIENT
Start: 2024-02-12 | End: 2024-05-12

## 2024-02-13 ENCOUNTER — OFFICE VISIT (OUTPATIENT)
Dept: FAMILY MEDICINE CLINIC | Facility: CLINIC | Age: 44
End: 2024-02-13
Payer: COMMERCIAL

## 2024-02-13 ENCOUNTER — HOSPITAL ENCOUNTER (OUTPATIENT)
Dept: GENERAL RADIOLOGY | Facility: HOSPITAL | Age: 44
Discharge: HOME | End: 2024-02-13
Admitting: NURSE PRACTITIONER
Payer: COMMERCIAL

## 2024-02-13 VITALS
WEIGHT: 232 LBS | HEART RATE: 103 BPM | SYSTOLIC BLOOD PRESSURE: 98 MMHG | HEIGHT: 63 IN | OXYGEN SATURATION: 99 % | BODY MASS INDEX: 41.11 KG/M2 | DIASTOLIC BLOOD PRESSURE: 64 MMHG

## 2024-02-13 DIAGNOSIS — J30.9 ALLERGIC RHINITIS, UNSPECIFIED SEASONALITY, UNSPECIFIED TRIGGER: ICD-10-CM

## 2024-02-13 DIAGNOSIS — M79.631 PAIN OF RIGHT FOREARM: ICD-10-CM

## 2024-02-13 DIAGNOSIS — E11.65 TYPE 2 DIABETES MELLITUS WITH HYPERGLYCEMIA, WITHOUT LONG-TERM CURRENT USE OF INSULIN: ICD-10-CM

## 2024-02-13 DIAGNOSIS — J45.20 MILD INTERMITTENT ASTHMA, UNSPECIFIED WHETHER COMPLICATED: ICD-10-CM

## 2024-02-13 DIAGNOSIS — M19.90 OSTEOARTHRITIS, UNSPECIFIED OSTEOARTHRITIS TYPE, UNSPECIFIED SITE: Primary | ICD-10-CM

## 2024-02-13 PROCEDURE — 73090 X-RAY EXAM OF FOREARM: CPT

## 2024-02-13 RX ORDER — MELOXICAM 15 MG/1
15 TABLET ORAL DAILY
Qty: 90 TABLET | Refills: 0 | Status: SHIPPED | OUTPATIENT
Start: 2024-02-13

## 2024-02-13 RX ORDER — FLUTICASONE PROPIONATE 50 MCG
1 SPRAY, SUSPENSION (ML) NASAL DAILY
Qty: 16 G | Refills: 0 | Status: SHIPPED | OUTPATIENT
Start: 2024-02-13

## 2024-02-13 RX ORDER — DULAGLUTIDE 3 MG/.5ML
3 INJECTION, SOLUTION SUBCUTANEOUS WEEKLY
Qty: 6 ML | Refills: 1 | Status: SHIPPED | OUTPATIENT
Start: 2024-02-13

## 2024-02-13 RX ORDER — MONTELUKAST SODIUM 10 MG/1
10 TABLET ORAL EVERY EVENING
Qty: 30 TABLET | Refills: 5 | Status: SHIPPED | OUTPATIENT
Start: 2024-02-13

## 2024-02-19 RX ORDER — ALBUTEROL SULFATE 90 UG/1
2 AEROSOL, METERED RESPIRATORY (INHALATION) EVERY 4 HOURS PRN
Qty: 8 G | Refills: 3 | Status: SHIPPED | OUTPATIENT
Start: 2024-02-19

## 2024-03-04 ENCOUNTER — TELEMEDICINE (OUTPATIENT)
Dept: PSYCHIATRY | Facility: CLINIC | Age: 44
End: 2024-03-04
Payer: COMMERCIAL

## 2024-03-04 DIAGNOSIS — F33.1 MAJOR DEPRESSIVE DISORDER, RECURRENT EPISODE, MODERATE: Primary | ICD-10-CM

## 2024-03-04 PROCEDURE — 90791 PSYCH DIAGNOSTIC EVALUATION: CPT | Performed by: COUNSELOR
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
## 2024-03-12 ENCOUNTER — TELEMEDICINE (OUTPATIENT)
Dept: BEHAVIORAL HEALTH | Facility: CLINIC | Age: 44
End: 2024-03-12
Payer: COMMERCIAL

## 2024-03-12 DIAGNOSIS — F51.05 INSOMNIA DUE TO OTHER MENTAL DISORDER: ICD-10-CM

## 2024-03-12 DIAGNOSIS — F33.0 MILD EPISODE OF RECURRENT MAJOR DEPRESSIVE DISORDER: ICD-10-CM

## 2024-03-12 DIAGNOSIS — F99 INSOMNIA DUE TO OTHER MENTAL DISORDER: ICD-10-CM

## 2024-03-12 DIAGNOSIS — F43.10 POST TRAUMATIC STRESS DISORDER (PTSD): ICD-10-CM

## 2024-03-12 DIAGNOSIS — F41.1 GENERALIZED ANXIETY DISORDER: Primary | ICD-10-CM

## 2024-03-12 RX ORDER — CITALOPRAM 40 MG/1
40 TABLET ORAL DAILY
Qty: 90 TABLET | Refills: 0 | Status: SHIPPED | OUTPATIENT
Start: 2024-03-12 | End: 2024-06-10

## 2024-03-12 RX ORDER — PREGABALIN 75 MG/1
CAPSULE ORAL
COMMUNITY
Start: 2024-03-04

## 2024-03-12 NOTE — PROGRESS NOTES
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 stress disorder (PTSD)  -     citalopram (CeleXA) 40 MG tablet; Take 1 tablet by mouth Daily for 90 days.  Dispense: 90 tablet; Refill: 0    4. Insomnia due to other mental disorder          Patient screened positive for depression based on a PHQ-9 score of 11 on 1/9/2024. Follow-up recommendations include: Prescribed antidepressant medication treatment, Referral to Mental Health specialist, and Suicide Risk Assessment performed.    Presentation seems most consistent with DURAN, MDD, likely PTSD, insomnia.  Patient states she is happy with current dose of medications.  We will continue Celexa for management depression, anxiety, and overall mood.  We will continue BuSpar for management depression, anxiety, and overall mood.  We will continue trazodone for sleep as needed.  Continue therapy.  Instructed patient to contact the office for any new or worsening symptoms or any other concerns.  Follow-up in 2 months.  Addressed all questions and concerns.    Visit Diagnoses:    ICD-10-CM ICD-9-CM   1. Generalized anxiety disorder  F41.1 300.02   2. Mild episode of recurrent major depressive disorder  F33.0 296.31   3. Post traumatic stress disorder (PTSD)  F43.10 309.81   4. Insomnia due to other mental disorder  F51.05 300.9    F99 327.02           PLAN:  Safety: No acute safety concerns at this time.  Therapy: We  will refer for psychotherapy  Risk Assessment: Risk of self-harm acutely is moderate.  Risk factors include anxiety disorder, mood disorder, family history, and recent psychosocial stressors (pandemic). Protective factors include denies access to guns/weapons, no present SI, no history of suicide attempts or self-harm in the past, minimal AODA, healthcare seeking, future orientation, willingness to engage in care.  Risk of self-harm chronically is also moderate, but could be further elevated in the event of treatment noncompliance and/or AODA.  Labs/Diagnostics Ordered:   No orders of the defined types were placed in this encounter.    Medications:   New Medications Ordered This Visit   Medications    citalopram (CeleXA) 40 MG tablet     Sig: Take 1 tablet by mouth Daily for 90 days.     Dispense:  90 tablet     Refill:  0     Discussed all risks, benefits, alternatives, and side effects of citalopram including but not limited to GI upset, sexual dysfunction, bleeding risk, seizure risk, insomnia, sedation, headache, activation of romina or hypomania, increased fragility fracture risk, hyponatremia, ocular effects, dose-dependent prolonged QT interval, withdrawal syndrome following abrupt discontinuation, serotonin syndrome, and activation of suicidal ideation and behavior.  Pt educated on the need to practice safe sex while taking this med. Discussed the need for pt to immediately call the office for any new or worsening symptoms, such as worsening depression; feeling nervous or restless; suicidal thoughts or actions; or other changes changes in mood or behavior, and all other concerns. Pt educated on med compliance and the risks of suddenly stopping this medication or missing doses. Pt verbalized understanding and is agreeable to taking citalopram. Addressed all questions and concerns.     Discussed all risks, benefits, alternatives, and side effects of Trazodone including but not limited to GI upset, sexual  dysfunction, dizziness, headache, nervousness, bleeding risk, seizure risk, sedation, headache, activation of romina or hypomania, increased fragility fracture risk, cardiac arrhythmias, priapism, hyponatremia, ocular effects, prolonged QT interval, withdrawal syndrome following abrupt discontinuation, serotonin syndrome, and activation of suicidal ideation and behavior.  Pt educated on the need to practice safe sex while taking this med. Discussed the need for pt to immediately call the office for any new or worsening symptoms, such as worsening depression; feeling nervous or restless; suicidal thoughts or actions; or other changes changes in mood or behavior, and all other concerns. Pt educated on med compliance and the risks of suddenly stopping this medication or missing doses. Pt verbalized understanding and is agreeable to taking Trazodone. Addressed all questions and concerns.     Discussed all risks, benefits, alternatives, and side effects of Buspirone including but not limited to GI upset, dizziness, sedation, HA, nervousness, restlessness, and serotonin syndrome.  Pt educated on the need to practice safe sex while taking this med. Discussed the need for pt to immediately call the office for any new or worsening symptoms, and all other concerns. Pt educated on med compliance. Pt verbalized understanding and is agreeable to taking Buspirone. Addressed all questions and concerns.     Follow up: F/u in 2 months.      TREATMENT PLAN/GOALS: Continue supportive psychotherapy efforts and medications as indicated. Treatment and medication options discussed during today's visit. Patient ackowledged and verbally consented to continue with current treatment plan and was educated on the importance of compliance with treatment and follow-up appointments.    MEDICATION ISSUES:  BEST reviewed as expected.  Discussed medication options and treatment plan of prescribed medication as well as the risks, benefits, and side  effects including potential falls, possible impaired driving and metabolic adversities among others. Patient is agreeable to call the office with any worsening of symptoms or onset of side effects. Patient is agreeable to call 911 or go to the nearest ER should he/she begin having SI/HI. No medication side effects or related complaints today.            This document has been electronically signed by Jeanette Keita PA-C  March 12, 2024 14:00 EDT      Part of this note may be an electronic transcription/translation of spoken language to printed text using the Dragon Dictation System.

## 2024-03-27 RX ORDER — NORGESTIMATE AND ETHINYL ESTRADIOL 7DAYSX3 28
KIT ORAL
Qty: 28 TABLET | Refills: 3 | Status: SHIPPED | OUTPATIENT
Start: 2024-03-27

## 2024-04-15 ENCOUNTER — TELEMEDICINE (OUTPATIENT)
Dept: PSYCHIATRY | Facility: CLINIC | Age: 44
End: 2024-04-15
Payer: COMMERCIAL

## 2024-04-15 DIAGNOSIS — F33.1 MAJOR DEPRESSIVE DISORDER, RECURRENT EPISODE, MODERATE: Primary | ICD-10-CM

## 2024-04-15 PROCEDURE — 90837 PSYTX W PT 60 MINUTES: CPT | Performed by: COUNSELOR

## 2024-04-15 NOTE — PROGRESS NOTES
[facility]   Follow-up Progress Note     Date: April 15, 2024  Time In:9:33 AM EDT   Time Out: 1032 EDT      PROGRESS NOTE    DATA:  Mary Go is a 43 y.o. female presenting to [facility] (through [facility]), [facility], [address] using a secure Globe[employer] Video Visit through [employer] for assessment with Genia Small LCSW. Patient is being seen remotely via telehealth at home address in [place] and stated they are in a secure environment for this session using [facility] My Chart. This provider is located at [facility], [address] [place] [address], [facility], using a secure MyChart Video Visit through [employer].  The patient consents to be seen remotely and when consent is given they understand that the consent allows for patient identifiable information to be sent to a third party as needed. They may refuse to be seen remotely at any time. The electronic data is encrypted and password protected,  patient or  legal guardian has been advised of the potential risks to privacy not withstanding such measures.     PT Identifiers used: Name and .    Mary Go is a 43 y.o. female who presents today for follow-up visit.    Chief Complaint:   Chief Complaint   Patient presents with    Depression        Data: Patient arrived on time and participated actively in session today.      Pt reported she has been busy at work due to it being Prom season. She reflected on a time when her and her ex's kids were going to prom, they had three kids going to [facility] one year but was able to not spend any money.    Pt stated her ex went back to [employer] and was notified recently about it. She reported his oldest daughter called to talk with her about it. She stated she had mixed feelings, she felt relief but also sad.     Pt reported due to being busy at work, she has not been able to clean her house  like she would like to. She stated the next three weeks will be her busiest and recently lost an employee. 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     Pt reported she has been spending time with her dad and is watching an old western with him. She stated she has gone out to eat with friends and has been asked out a few times. She reported she has not gone out on any dates though. Pt reported she feels not ready to date yet and is enjoying being by herself. She stated she is considering going out to eat with a shannan but their schedules do not match up.      Assisted patient in processing above session content; acknowledged and normalized patient’s thoughts, feelings, and concerns.  Rationalized patient thought process regarding recent stressors and life events. Discussed triggers associated with patient's emotions. Also discussed coping skills for patient to implement. Discussed mindfulness and radical acceptance.    Clinical Maneuvering/Intervention:  Allowed Patient to freely discuss issues  without interruption or judgement with unconditional positive regard, active listening skills, and empathy. Therapist provided a safe, confidential environment to facilitate the development of a positive therapeutic relationship and encouraged open, honest communication. Assisted Patient in identifying risk factors which would indicate the need for higher level of care including thoughts to harm self or others and/or self-harming behavior and encouraged Patient to contact this office, call 911, or present to the nearest emergency room should any of these events occur. Discussed crisis intervention services and means to access. Patient adamantly and convincingly denies current suicidal or homicidal ideation or perceptual disturbance. Assisted Patient in processing session content; acknowledged and normalized Patient’s thoughts, feelings, and concerns  by utilizing a person-centered approach in efforts to build appropriate rapport and a positive therapeutic relationship with open and honest communication. Therapist utilized dialectical behavior techniques to teach and model emotional regulation and relaxation methods. Therapist assisted Patient with identifying and implementing healthier coping strategies.     ASSESSMENT:    Patient appears to maintain relative stability as compared to their baseline.  Patient also appears to be experiencing heightened anxiety and depression in response to COVID-19 epidemic and continues to struggle with depression, which continues to cause impairment in important areas of functioning.  Patient is receptive to assistance with maintaining a stable lifestyle and participates in the therapeutic process. As a result, they can be reasonably expected to continue to benefit from treatment and would likely be at increased risk for decompensation otherwise.       PHQ-Score Total:  PHQ-9 Total Score: 14    DURAN-7 Score Total:  Over the last two weeks, how often have you been bothered by the following problems?  Feeling nervous, anxious or on edge: More than half the days  Not being able to stop or control worrying: More than half the days  Worrying too much about different things: More than half the days  Trouble Relaxing: Not at all  Being so restless that it is hard to sit still: Not at all  Becoming easily annoyed or irritable: Several days  Feeling afraid as if something awful might happen: Not at all  DURAN 7 Total Score: 7  If you checked any problems, how difficult have these problems made it for you to do your work, take care of things at home, or get along with other people: Very difficult        MENTAL STATUS EXAM   General Appearance:  Cleanly groomed and dressed  Eye Contact:  Good eye contact  Attitude:  Cooperative  Motor Activity:  Normal gait, posture  Speech:  Normal rate, tone, volume  Mood and affect:  Normal,  pleasant  Thought Process:  Logical and goal-directed  Associations/ Thought Content:  No delusions  Hallucinations:  None  Suicidal Ideations:  Not present  Homicidal Ideation:  Not present  Sensorium:  Alert and clear  Orientation:  Person, place and time  Immediate Recall, Recent, and Remote Memory:  Intact  Attention Span/ Concentration:  Good  Fund of Knowledge:  Appropriate for age and educational level  Insight:  Good  Judgement:  Good  Reliability:  Good  Impulse Control:  Good        Functional Status: Moderate impairment     Progress toward goal: Not at goal    Prognosis: Fair with Ongoing Treatment            Impression/Formulation:    VISIT DIAGNOSIS:    ICD-10-CM ICD-9-CM   1. Major depressive disorder, recurrent episode, moderate  F33.1 296.32        Patient appeared alert and oriented.  Patient is voluntarily requesting to continue outpatient therapy at [facility].  Patient is receptive to assistance with maintaining a stable lifestyle.  Patient presents with the above diagnoses.  Patient is agreeable to attend routine therapy sessions.  Patient expressed desire to maintain stability and participate in the therapeutic process.     Plan:   Patient will continue in individual outpatient therapy with focus on improved functioning and coping skills, maintaining stability, and avoiding decompensation and the need for higher level of care.    Patient will adhere to medication regimen as prescribed and report any side effects.    Patient will contact this office ([facility] at [phone]), call 911 or present to the nearest emergency room should suicidal or homicidal ideations occur. Therapist will continue to provide Cognitive Behavioral Therapy, Solution Focused Therapy and Dialectical Behavioral Therapy, as well as other modalities as needed, to improve functioning, maintain stability, and avoid decompensation and the need for higher level  of care.       Return in about 1 week (around 4/22/2024) for Video visit., or earlier if symptoms worsen or fail to improve.    Crisis Plan:  Symptoms and/or behaviors to indicate a crisis: Excessive worry or fear, Feeling sad or low, Isolation, Lack of sleep, Increased hunger or lack of appetite, and Thinking about suicide    What calming techniques or other strategies will patient use to de-esclate and stay safe: slow down, breathe, visualize calming self, think it though, listen to music, change focus, take a walk, talk with someone, use distraction techniques, exercise, use grounding techniques,     Who is one person patient can contact to assist with de-escalation? friend    If symptoms/behaviors persist, Patient will present to the nearest hospital for an assessment. Advised patient of [facility] ER and assessment services.     Recommended Referrals: none at this time.          This document has been electronically signed by Genia Small LCSW  April 15, 2024 10:32 EDT     Part of this note may be an electronic transcription/translation of spoken language to printed text using the Dragon Dictation System.

## 2024-05-02 DIAGNOSIS — E11.65 TYPE 2 DIABETES MELLITUS WITH HYPERGLYCEMIA, WITHOUT LONG-TERM CURRENT USE OF INSULIN: ICD-10-CM

## 2024-05-07 DIAGNOSIS — E11.65 TYPE 2 DIABETES MELLITUS WITH HYPERGLYCEMIA, WITHOUT LONG-TERM CURRENT USE OF INSULIN: ICD-10-CM

## 2024-05-07 RX ORDER — DULAGLUTIDE 3 MG/.5ML
INJECTION, SOLUTION SUBCUTANEOUS
Refills: 1 | OUTPATIENT
Start: 2024-05-07

## 2024-05-08 RX ORDER — DULAGLUTIDE 3 MG/.5ML
3 INJECTION, SOLUTION SUBCUTANEOUS WEEKLY
Qty: 6 ML | Refills: 1 | Status: SHIPPED | OUTPATIENT
Start: 2024-05-08

## 2024-05-14 ENCOUNTER — LAB (OUTPATIENT)
Dept: LAB | Facility: HOSPITAL | Age: 44
End: 2024-05-14
Payer: COMMERCIAL

## 2024-05-14 ENCOUNTER — TELEMEDICINE (OUTPATIENT)
Dept: BEHAVIORAL HEALTH | Facility: CLINIC | Age: 44
End: 2024-05-14
Payer: COMMERCIAL

## 2024-05-14 ENCOUNTER — OFFICE VISIT (OUTPATIENT)
Dept: FAMILY MEDICINE CLINIC | Facility: CLINIC | Age: 44
End: 2024-05-14
Payer: COMMERCIAL

## 2024-05-14 VITALS
WEIGHT: 234 LBS | OXYGEN SATURATION: 98 % | HEART RATE: 96 BPM | BODY MASS INDEX: 41.46 KG/M2 | DIASTOLIC BLOOD PRESSURE: 73 MMHG | HEIGHT: 63 IN | SYSTOLIC BLOOD PRESSURE: 119 MMHG

## 2024-05-14 DIAGNOSIS — F41.1 GENERALIZED ANXIETY DISORDER: Primary | ICD-10-CM

## 2024-05-14 DIAGNOSIS — J45.20 MILD INTERMITTENT ASTHMA, UNSPECIFIED WHETHER COMPLICATED: ICD-10-CM

## 2024-05-14 DIAGNOSIS — F33.41 RECURRENT MAJOR DEPRESSIVE DISORDER, IN PARTIAL REMISSION: ICD-10-CM

## 2024-05-14 DIAGNOSIS — E55.9 VITAMIN D DEFICIENCY: ICD-10-CM

## 2024-05-14 DIAGNOSIS — E66.01 CLASS 3 SEVERE OBESITY DUE TO EXCESS CALORIES WITH SERIOUS COMORBIDITY AND BODY MASS INDEX (BMI) OF 40.0 TO 44.9 IN ADULT: ICD-10-CM

## 2024-05-14 DIAGNOSIS — Z12.11 COLON CANCER SCREENING: Primary | ICD-10-CM

## 2024-05-14 DIAGNOSIS — F99 INSOMNIA DUE TO OTHER MENTAL DISORDER: ICD-10-CM

## 2024-05-14 DIAGNOSIS — E11.65 TYPE 2 DIABETES MELLITUS WITH HYPERGLYCEMIA, WITHOUT LONG-TERM CURRENT USE OF INSULIN: ICD-10-CM

## 2024-05-14 DIAGNOSIS — F51.05 INSOMNIA DUE TO OTHER MENTAL DISORDER: ICD-10-CM

## 2024-05-14 DIAGNOSIS — M19.90 OSTEOARTHRITIS, UNSPECIFIED OSTEOARTHRITIS TYPE, UNSPECIFIED SITE: ICD-10-CM

## 2024-05-14 LAB
25(OH)D3 SERPL-MCNC: 24.8 NG/ML (ref 30–100)
ALBUMIN SERPL-MCNC: 4.1 G/DL (ref 3.5–5.2)
ALBUMIN UR-MCNC: <1.2 MG/DL
ALBUMIN/GLOB SERPL: 1.6 G/DL
ALP SERPL-CCNC: 84 U/L (ref 39–117)
ALT SERPL W P-5'-P-CCNC: 16 U/L (ref 1–33)
ANION GAP SERPL CALCULATED.3IONS-SCNC: 10.5 MMOL/L (ref 5–15)
AST SERPL-CCNC: 19 U/L (ref 1–32)
BILIRUB SERPL-MCNC: 0.4 MG/DL (ref 0–1.2)
BUN SERPL-MCNC: 8 MG/DL (ref 6–20)
BUN/CREAT SERPL: 12.7 (ref 7–25)
CALCIUM SPEC-SCNC: 8.9 MG/DL (ref 8.6–10.5)
CHLORIDE SERPL-SCNC: 107 MMOL/L (ref 98–107)
CHOLEST SERPL-MCNC: 157 MG/DL (ref 0–200)
CO2 SERPL-SCNC: 20.5 MMOL/L (ref 22–29)
CREAT SERPL-MCNC: 0.63 MG/DL (ref 0.57–1)
CREAT UR-MCNC: 143.1 MG/DL
EGFRCR SERPLBLD CKD-EPI 2021: 112.3 ML/MIN/1.73
GLOBULIN UR ELPH-MCNC: 2.5 GM/DL
GLUCOSE SERPL-MCNC: 108 MG/DL (ref 65–99)
HBA1C MFR BLD: 6.2 % (ref 4.8–5.6)
HDLC SERPL-MCNC: 39 MG/DL (ref 40–60)
LDLC SERPL CALC-MCNC: 92 MG/DL (ref 0–100)
LDLC/HDLC SERPL: 2.27 {RATIO}
MICROALBUMIN/CREAT UR: NORMAL MG/G{CREAT}
POTASSIUM SERPL-SCNC: 4.3 MMOL/L (ref 3.5–5.2)
PROT SERPL-MCNC: 6.6 G/DL (ref 6–8.5)
SODIUM SERPL-SCNC: 138 MMOL/L (ref 136–145)
TRIGL SERPL-MCNC: 147 MG/DL (ref 0–150)
VLDLC SERPL-MCNC: 26 MG/DL (ref 5–40)

## 2024-05-14 PROCEDURE — 82306 VITAMIN D 25 HYDROXY: CPT

## 2024-05-14 PROCEDURE — 80053 COMPREHEN METABOLIC PANEL: CPT

## 2024-05-14 PROCEDURE — 82570 ASSAY OF URINE CREATININE: CPT

## 2024-05-14 PROCEDURE — 36415 COLL VENOUS BLD VENIPUNCTURE: CPT

## 2024-05-14 PROCEDURE — 99214 OFFICE O/P EST MOD 30 MIN: CPT | Performed by: PHYSICIAN ASSISTANT

## 2024-05-14 PROCEDURE — 99214 OFFICE O/P EST MOD 30 MIN: CPT | Performed by: NURSE PRACTITIONER

## 2024-05-14 PROCEDURE — 80061 LIPID PANEL: CPT

## 2024-05-14 PROCEDURE — 83036 HEMOGLOBIN GLYCOSYLATED A1C: CPT

## 2024-05-14 PROCEDURE — 82043 UR ALBUMIN QUANTITATIVE: CPT

## 2024-05-14 RX ORDER — ERGOCALCIFEROL 1.25 MG/1
50000 CAPSULE ORAL WEEKLY
Qty: 13 CAPSULE | Refills: 1 | Status: SHIPPED | OUTPATIENT
Start: 2024-05-14

## 2024-05-14 RX ORDER — MELOXICAM 15 MG/1
15 TABLET ORAL DAILY
Qty: 30 TABLET | Refills: 5 | Status: SHIPPED | OUTPATIENT
Start: 2024-05-14

## 2024-05-14 RX ORDER — DULAGLUTIDE 3 MG/.5ML
3 INJECTION, SOLUTION SUBCUTANEOUS WEEKLY
Qty: 6 ML | Refills: 2 | Status: SHIPPED | OUTPATIENT
Start: 2024-05-14

## 2024-05-14 RX ORDER — CITALOPRAM 40 MG/1
40 TABLET ORAL DAILY
Qty: 90 TABLET | Refills: 0 | Status: SHIPPED | OUTPATIENT
Start: 2024-05-14 | End: 2024-08-12

## 2024-05-14 RX ORDER — MONTELUKAST SODIUM 10 MG/1
10 TABLET ORAL EVERY EVENING
Qty: 30 TABLET | Refills: 5 | Status: SHIPPED | OUTPATIENT
Start: 2024-05-14

## 2024-05-14 RX ORDER — LANCETS 28 GAUGE
EACH MISCELLANEOUS
Qty: 100 EACH | Refills: 5 | Status: SHIPPED | OUTPATIENT
Start: 2024-05-14

## 2024-05-14 RX ORDER — NORGESTIMATE AND ETHINYL ESTRADIOL 7DAYSX3 28
1 KIT ORAL DAILY
Qty: 28 TABLET | Refills: 3 | Status: SHIPPED | OUTPATIENT
Start: 2024-05-14

## 2024-05-14 RX ORDER — TRAZODONE HYDROCHLORIDE 50 MG/1
TABLET ORAL
Qty: 60 TABLET | Refills: 1 | Status: SHIPPED | OUTPATIENT
Start: 2024-05-14

## 2024-05-14 NOTE — PROGRESS NOTES
Chief Complaint  Diabetes    SUBJECTIVE  Mary Go presents to [facility]   Pt presents today for f/u DM2, wants to change meds over to [facility]. Due for labs, no complaints     Diabetes      Past Medical History:   Diagnosis Date    Allergic     Allergy, unspecified, initial encounter     Anemia     Ankle sprain     Arthritis     Arthritis of back 2002    Arthritis of neck 2002    Asthma     Cervical disc disorder     Colitis, ulcerative 2002    Colon polyp     Depression     Diabetes mellitus type 2 in nonobese 03/06/2018    Fracture of wrist     2nd grade    Fracture, finger 2018    Fracture, foot     Limb swelling     Low back pain     Obesity 03/06/2018    Pain in both knees 03/20/2018    UNSPECIFIED CHRONICITY    Patellar tendinitis 03/20/2018    Psoriasis 07/30/2019    Reflux esophagitis     Seasonal allergies     Sinusitis, chronic     Skin disorder     SOB (shortness of breath)     Tendonitis 03/20/2018    INSERTIONAL PATELLAR TENDONITIS, BILATERAL    Thoracic disc disorder     Wrist sprain       Family History   Problem Relation Age of Onset    Diabetes Mother         UNSPECIFIED TYPE    Arthritis Mother     Osteoporosis Mother     Anesthesia problems Mother         Pseudocholinesterase    Broken bones Mother     Kidney disease Mother     Stroke Mother     Stroke Sister     Diabetes Sister         UNSPECIFIED TYPE    Arthritis Sister     Osteoporosis Sister     Breast cancer Maternal Grandmother         MALIGNANT    Heart disease Maternal Grandmother     Cancer Maternal Grandmother         Breast    Hearing loss Maternal Grandmother     Kidney disease Maternal Grandmother     Stroke Maternal Grandfather     Heart disease Maternal Grandfather     Breast cancer Paternal Grandmother         MALIGNANT    Cancer Paternal Grandmother     Cancer Paternal Grandfather         UNSPECIFIED    COPD Other     Emphysema Other     Diabetes Other     Diabetes Sister     Liver disease  Sister     Miscarriages / Stillbirths Sister       Past Surgical History:   Procedure Laterality Date    CERVICAL POLYPECTOMY  2001    COLONOSCOPY  07/13/2018    REPEAT IN 3 YEARS (ELIZABETH)    DIAGNOSTIC LAPAROSCOPY      ENDOSCOPY  07/31/2018    POLYPECTOMY  2002    STOMACH POLYP REMOVED    WISDOM TOOTH EXTRACTION  1997        Current Outpatient Medications:     albuterol sulfate  (90 Base) MCG/ACT inhaler, Inhale 2 puffs Every 4 (Four) Hours As Needed for Wheezing or Shortness of Air., Disp: 8 g, Rfl: 3    azelastine (ASTELIN) 0.1 % nasal spray, 2 sprays into the nostril(s) as directed by provider 2 (Two) Times a Day., Disp: , Rfl:     busPIRone (BUSPAR) 10 MG tablet, Take 1 tablet by mouth 3 (Three) Times a Day., Disp: 90 tablet, Rfl: 0    citalopram (CeleXA) 40 MG tablet, Take 1 tablet by mouth Daily for 90 days., Disp: 90 tablet, Rfl: 0    cyclobenzaprine (FLEXERIL) 10 MG tablet, TAKE ONE TABLET BY MOUTH EVERY NIGHT AT BEDTIME IF NEEDED FOR MUSCLE SPASM, Disp: 30 tablet, Rfl: 0    Dulaglutide (Trulicity) 3 MG/0.5ML solution pen-injector, Inject 0.5 mL under the skin into the appropriate area as directed 1 (One) Time Per Week., Disp: 6 mL, Rfl: 2    empagliflozin (Jardiance) 10 MG tablet tablet, Take 1 tablet by mouth Daily., Disp: 30 tablet, Rfl: 5    EPINEPHrine (EPIPEN) 0.3 MG/0.3ML solution auto-injector injection, Inject 0.3 mL into the appropriate muscle as directed by prescriber 1 (One) Time., Disp: , Rfl:     ergocalciferol (ERGOCALCIFEROL) 1.25 MG (13434 UT) capsule, Take 1 capsule by mouth 1 (One) Time Per Week., Disp: 13 capsule, Rfl: 1    fluticasone (FLONASE) 50 MCG/ACT nasal spray, 1 spray by Each Nare route Daily., Disp: 16 g, Rfl: 0    glucose blood (FREESTYLE LITE) test strip, USE AS DIRECTED TO CHECK BLOOD SUGAR TWO TIMES A DAY, Disp: 100 each, Rfl: 5    hydrOXYzine (ATARAX) 25 MG tablet, Take 1 tablet by mouth 3 (Three) Times a Day As Needed for Anxiety., Disp: 30 tablet, Rfl: 0     "ibuprofen (ADVIL,MOTRIN) 800 MG tablet, Take 1 tablet by mouth Every 6 (Six) Hours As Needed for Mild Pain., Disp: 30 tablet, Rfl: 0    ketoconazole (NIZORAL) 2 % shampoo, APPLY TOPICALLY TO THE AFFECTED AREA ONCE WEEKLY, Disp: 120 mL, Rfl: 2    Lancets (freestyle) lancets, USE TO TEST BLOOD SUGAR TWO TIMES A DAY, Disp: 100 each, Rfl: 5    levocetirizine (XYZAL) 5 MG tablet, Take 1 tablet by mouth Daily., Disp: , Rfl:     loratadine (CLARITIN) 10 MG tablet, Claritin 10 mg oral tablet take 1 tablet (10 mg) by oral route once daily   Active, Disp: , Rfl:     meloxicam (MOBIC) 15 MG tablet, Take 1 tablet by mouth Daily., Disp: 30 tablet, Rfl: 5    montelukast (SINGULAIR) 10 MG tablet, Take 1 tablet by mouth Every Evening., Disp: 30 tablet, Rfl: 5    norgestimate-ethinyl estradiol (Tri-Estarylla) 0.18/0.215/0.25 MG-35 MCG per tablet, Take 1 tablet by mouth Daily., Disp: 28 tablet, Rfl: 3    Omeprazole (PRILOSEC PO), Take 1 tablet by mouth Daily As Needed., Disp: , Rfl:     pregabalin (LYRICA) 75 MG capsule, TAKE 1 CAPSULE TWICE A DAY BY ORAL ROUTE AS DIRECTED FOR 30 DAYS., Disp: , Rfl:     traZODone (DESYREL) 50 MG tablet, Take 0.5 to 2 tab PO QHS PRN sleep, Disp: 60 tablet, Rfl: 1    Continuous Blood Gluc Sensor (FreeStyle Terrie 14 Day Sensor[place] [facility] [place] each Every 14 (Fourteen) Days. (Patient not taking: Reported on 11/13/2023), Disp: 2 each, Rfl: 11    OBJECTIVE  Vital Signs:   /73   Pulse 96   Ht 160 cm (62.99\")   Wt 106 kg (234 lb)   SpO2 98%   BMI 41.46 kg/m²    Estimated body mass index is 41.46 kg/m² as calculated from the following:    Height as of this encounter: 160 cm (62.99\").    Weight as of this encounter: 106 kg (234 lb).     Wt Readings from Last 3 Encounters:   05/14/24 106 kg (234 lb)   02/13/24 105 kg (232 lb)   01/09/24 104 kg (229 lb)     BP Readings from Last 3 Encounters:   05/14/24 119/73   02/13/24 98/64   01/09/24 108/79       Physical Exam  Vitals reviewed.   Constitutional:       " Appearance: Normal appearance. She is well-developed.   HENT:      Head: Normocephalic and atraumatic.      Right Ear: External ear normal.      Left Ear: External ear normal.   Eyes:      Conjunctiva/sclera: Conjunctivae normal.      Pupils: Pupils are equal, round, and reactive to light.   Cardiovascular:      Rate and Rhythm: Normal rate and regular rhythm.      Heart sounds: No murmur heard.     No friction rub. No gallop.   Pulmonary:      Effort: Pulmonary effort is normal.      Breath sounds: Normal breath sounds. No wheezing or rhonchi.   Skin:     General: Skin is warm and dry.   Neurological:      Mental Status: She is alert and oriented to person, place, and time.      Cranial Nerves: No cranial nerve deficit.   Psychiatric:         Mood and Affect: Mood and affect normal.         Behavior: Behavior normal.         Thought Content: Thought content normal.         Judgment: Judgment normal.          Result Review    CMP          8/9/2023    11:10 11/14/2023    11:36   CMP   Glucose 157  109    BUN 7  9    Creatinine 0.90  0.84    EGFR 81.5  88.0    Sodium 138  139    Potassium 4.0  4.2    Chloride 103  108    Calcium 9.8  9.0    Total Protein 7.2  7.2    Albumin 4.2  4.2    Globulin 3.0  3.0    Total Bilirubin 0.6  0.3    Alkaline Phosphatase 83  85    AST (SGOT) 21  19    ALT (SGPT) 18  17    Albumin/Globulin Ratio 1.4  1.4    BUN/Creatinine Ratio 7.8  10.7    Anion Gap 12.4  10.1      CBC          8/9/2023    11:10   CBC   WBC 9.79    RBC 4.69    Hemoglobin 15.1    Hematocrit 43.6    MCV 93.0    MCH 32.2    MCHC 34.6    RDW 12.6    Platelets 372      Lipid Panel          8/9/2023    11:10 11/14/2023    11:36   Lipid Panel   Total Cholesterol 198  151    Triglycerides 135  131    HDL Cholesterol 51  39    VLDL Cholesterol 24  23    LDL Cholesterol  123  89    LDL/HDL Ratio 2.35  2.20      TSH          8/9/2023    11:10   TSH   TSH 1.410      Most Recent A1C          11/14/2023    11:36   HGBA1C Most  Recent   Hemoglobin A1C 6.30        A1C Last 3 Results          8/9/2023    11:10 11/14/2023    11:36   HGBA1C Last 3 Results   Hemoglobin A1C 6.50  6.30      Lab Results   Component Value Date    QOQF39ZP 22.8 (L) 11/14/2023        Lab Results   Component Value Date    FREET4 1.1 03/11/2021          XR Forearm 2 View Right    Result Date: 2/13/2024   Negative forearm radiograph.      VAISHALI GIBSON MD       Electronically Signed and Approved By: VAISHALI GIBSON MD on 2/13/2024 at 15:45                The above data has been reviewed by NIKOLE Odom 05/14/2024 11:03 EDT.          Patient Care Team:  Nicolle Mcmahon APRN as PCP - General (Nurse Practitioner)            ASSESSMENT & PLAN    Diagnoses and all orders for this visit:    1. Colon cancer screening (Primary)  -     Ambulatory Referral For Screening Colonoscopy    2. Type 2 diabetes mellitus with hyperglycemia, without long-term current use of insulin  Assessment & Plan:  Stable and well controlled, cont current medication     Orders:  -     Comprehensive Metabolic Panel; Future  -     Lipid Panel; Future  -     Hemoglobin A1c; Future  -     Microalbumin / Creatinine Urine Ratio - Urine, Clean Catch; Future  -     Dulaglutide (Trulicity) 3 MG/0.5ML solution pen-injector; Inject 0.5 mL under the skin into the appropriate area as directed 1 (One) Time Per Week.  Dispense: 6 mL; Refill: 2  -     empagliflozin (Jardiance) 10 MG tablet tablet; Take 1 tablet by mouth Daily.  Dispense: 30 tablet; Refill: 5  -     Lancets (freestyle) lancets; USE TO TEST BLOOD SUGAR TWO TIMES A DAY  Dispense: 100 each; Refill: 5    3. Vitamin D deficiency  Overview:  Well controlled with Vit D supplementation , cont current dose     Orders:  -     ergocalciferol (ERGOCALCIFEROL) 1.25 MG (66312 UT) capsule; Take 1 capsule by mouth 1 (One) Time Per Week.  Dispense: 13 capsule; Refill: 1  -     Vitamin D 25 hydroxy; Future    4. Mild intermittent asthma, unspecified whether  complicated  Assessment & Plan:  Stable and well controlled, cont current medication           Orders:  -     montelukast (SINGULAIR) 10 MG tablet; Take 1 tablet by mouth Every Evening.  Dispense: 30 tablet; Refill: 5    5. Osteoarthritis, unspecified osteoarthritis type, unspecified site  Assessment & Plan:  Stable with PRN use mobic, cont current medication     Orders:  -     meloxicam (MOBIC) 15 MG tablet; Take 1 tablet by mouth Daily.  Dispense: 30 tablet; Refill: 5    6. Class 3 severe obesity due to excess calories with serious comorbidity and body mass index (BMI) of 40.0 to 44.9 in adult  Overview:  Cont to work on diet and exercise       Other orders  -     norgestimate-ethinyl estradiol (Tri-Estarylla) 0.18/0.215/0.25 MG-35 MCG per tablet; Take 1 tablet by mouth Daily.  Dispense: 28 tablet; Refill: 3         Tobacco Use: High Risk (5/14/2024)    Patient History     Smoking Tobacco Use: Some Days     Smokeless Tobacco Use: Never     Passive Exposure: Not on file       Follow Up     Return in about 6 months (around 11/14/2024), or if symptoms worsen or fail to improve.        Patient was given instructions and counseling regarding her condition or for health maintenance advice. Please see specific information pulled into the AVS if appropriate.   I have reviewed information obtained and documented by others and I have confirmed the accuracy of this documented note.    NIKOLE Odom

## 2024-05-14 NOTE — PROGRESS NOTES
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 the need to practice safe sex while taking this med. Discussed the need for pt to immediately call the office for any new or worsening symptoms, such as worsening depression; feeling nervous or restless; suicidal thoughts or actions; or other changes changes in mood or behavior, and all other concerns. Pt educated on med compliance and the risks of suddenly stopping this medication or missing doses. Pt verbalized understanding and is agreeable to taking citalopram. Addressed all questions and concerns.     Discussed all risks, benefits, alternatives, and side effects of Trazodone including but not limited to GI upset, sexual dysfunction, dizziness, headache, nervousness, bleeding risk, seizure risk, sedation, headache, activation of romina or hypomania, increased fragility fracture risk, cardiac arrhythmias, priapism, hyponatremia, ocular effects, prolonged QT interval, withdrawal syndrome following abrupt discontinuation, serotonin syndrome, and activation of suicidal ideation and behavior.  Pt educated on the need to practice safe sex while taking this med. Discussed the need for pt to immediately call the office for any new or worsening symptoms, such as worsening depression; feeling nervous or restless; suicidal thoughts or actions; or other changes changes in mood or behavior, and  all other concerns. Pt educated on med compliance and the risks of suddenly stopping this medication or missing doses. Pt verbalized understanding and is agreeable to taking Trazodone. Addressed all questions and concerns.     Discussed all risks, benefits, alternatives, and side effects of Buspirone including but not limited to GI upset, dizziness, sedation, HA, nervousness, restlessness, and serotonin syndrome.  Pt educated on the need to practice safe sex while taking this med. Discussed the need for pt to immediately call the office for any new or worsening symptoms, and all other concerns. Pt educated on med compliance. Pt verbalized understanding and is agreeable to taking Buspirone. Addressed all questions and concerns.     Follow up: F/u in 3 months.      TREATMENT PLAN/GOALS: Continue supportive psychotherapy efforts and medications as indicated. Treatment and medication options discussed during today's visit. Patient ackowledged and verbally consented to continue with current treatment plan and was educated on the importance of compliance with treatment and follow-up appointments.    MEDICATION ISSUES:  BEST reviewed as expected.  Discussed medication options and treatment plan of prescribed medication as well as the risks, benefits, and side effects including potential falls, possible impaired driving and metabolic adversities among others. Patient is agreeable to call the office with any worsening of symptoms or onset of side effects. Patient is agreeable to call 911 or go to the nearest ER should he/she begin having SI/HI. No medication side effects or related complaints today.            This document has been electronically signed by Jeanette Keita PA-C  May 14, 2024 13:57 EDT      Part of this note may be an electronic transcription/translation of spoken language to printed text using the Dragon Dictation System.

## 2024-06-27 ENCOUNTER — TELEPHONE (OUTPATIENT)
Dept: FAMILY MEDICINE CLINIC | Facility: CLINIC | Age: 44
End: 2024-06-27
Payer: COMMERCIAL

## 2024-06-27 NOTE — TELEPHONE ENCOUNTER
Pt requesting to be seen on 07/01 before 10:00am for sinus infection. Requesting callback [phone]

## 2024-07-01 ENCOUNTER — OFFICE VISIT (OUTPATIENT)
Dept: FAMILY MEDICINE CLINIC | Facility: CLINIC | Age: 44
End: 2024-07-01
Payer: COMMERCIAL

## 2024-07-01 VITALS
HEIGHT: 63 IN | DIASTOLIC BLOOD PRESSURE: 50 MMHG | HEART RATE: 86 BPM | WEIGHT: 236 LBS | BODY MASS INDEX: 41.82 KG/M2 | OXYGEN SATURATION: 97 % | SYSTOLIC BLOOD PRESSURE: 105 MMHG

## 2024-07-01 DIAGNOSIS — J30.9 ALLERGIC RHINITIS, UNSPECIFIED SEASONALITY, UNSPECIFIED TRIGGER: ICD-10-CM

## 2024-07-01 DIAGNOSIS — J45.909 ASTHMA, UNSPECIFIED ASTHMA SEVERITY, UNSPECIFIED WHETHER COMPLICATED, UNSPECIFIED WHETHER PERSISTENT: ICD-10-CM

## 2024-07-01 DIAGNOSIS — J32.9 SINUSITIS, UNSPECIFIED CHRONICITY, UNSPECIFIED LOCATION: Primary | ICD-10-CM

## 2024-07-01 PROCEDURE — 99213 OFFICE O/P EST LOW 20 MIN: CPT | Performed by: NURSE PRACTITIONER

## 2024-07-01 RX ORDER — FLUCONAZOLE 150 MG/1
150 TABLET ORAL ONCE
Qty: 2 TABLET | Refills: 0 | Status: SHIPPED | OUTPATIENT
Start: 2024-07-01 | End: 2024-07-01

## 2024-07-01 RX ORDER — PREDNISONE 20 MG/1
20 TABLET ORAL 2 TIMES DAILY
Qty: 6 TABLET | Refills: 0 | Status: SHIPPED | OUTPATIENT
Start: 2024-07-01 | End: 2024-07-04

## 2024-07-01 RX ORDER — AZELASTINE 1 MG/ML
2 SPRAY, METERED NASAL 2 TIMES DAILY
Qty: 30 ML | Refills: 1 | Status: SHIPPED | OUTPATIENT
Start: 2024-07-01

## 2024-07-01 RX ORDER — AZITHROMYCIN 250 MG/1
TABLET, FILM COATED ORAL
Qty: 6 TABLET | Refills: 0 | Status: SHIPPED | OUTPATIENT
Start: 2024-07-01

## 2024-07-01 RX ORDER — ALBUTEROL SULFATE 90 UG/1
2 AEROSOL, METERED RESPIRATORY (INHALATION) EVERY 4 HOURS PRN
Qty: 8 G | Refills: 3 | Status: SHIPPED | OUTPATIENT
Start: 2024-07-01

## 2024-07-01 RX ORDER — FLUTICASONE PROPIONATE 50 MCG
1 SPRAY, SUSPENSION (ML) NASAL DAILY
Qty: 16 G | Refills: 0 | Status: SHIPPED | OUTPATIENT
Start: 2024-07-01

## 2024-07-01 NOTE — PROGRESS NOTES
Chief Complaint  Sinus Problem, Cough, and Headache    SUBJECTIVE  Mary Go presents to [facility] for sinus pain/pressure for 1 week, congestion, drainage, cough. No fever . States always has a lot of allergy problems         History of Present Illness  Past Medical History:   Diagnosis Date    Allergic     Allergy, unspecified, initial encounter     Anemia     Ankle sprain     Arthritis     Arthritis of back 2002    Arthritis of neck 2002    Asthma     Cervical disc disorder     Colitis, ulcerative 2002    Colon polyp     Depression     Diabetes mellitus type 2 in nonobese 03/06/2018    Fracture of wrist     2nd grade    Fracture, finger 2018    Fracture, foot     Limb swelling     Low back pain     Obesity 03/06/2018    Pain in both knees 03/20/2018    UNSPECIFIED CHRONICITY    Patellar tendinitis 03/20/2018    Psoriasis 07/30/2019    Reflux esophagitis     Seasonal allergies     Sinusitis, chronic     Skin disorder     SOB (shortness of breath)     Tendonitis 03/20/2018    INSERTIONAL PATELLAR TENDONITIS, BILATERAL    Thoracic disc disorder     Wrist sprain       Family History   Problem Relation Age of Onset    Diabetes Mother         UNSPECIFIED TYPE    Arthritis Mother     Osteoporosis Mother     Anesthesia problems Mother         Pseudocholinesterase    Broken bones Mother     Kidney disease Mother     Stroke Mother     Stroke Sister     Diabetes Sister         UNSPECIFIED TYPE    Arthritis Sister     Osteoporosis Sister     Breast cancer Maternal Grandmother         MALIGNANT    Heart disease Maternal Grandmother     Cancer Maternal Grandmother         Breast    Hearing loss Maternal Grandmother     Kidney disease Maternal Grandmother     Stroke Maternal Grandfather     Heart disease Maternal Grandfather     Breast cancer Paternal Grandmother         MALIGNANT    Cancer Paternal Grandmother     Cancer Paternal Grandfather         UNSPECIFIED    COPD Other      Emphysema Other     Diabetes Other     Diabetes Sister     Liver disease Sister     Miscarriages / Stillbirths Sister       Past Surgical History:   Procedure Laterality Date    CERVICAL POLYPECTOMY  2001    COLONOSCOPY  07/13/2018    REPEAT IN 3 YEARS (ELIZABETH)    DIAGNOSTIC LAPAROSCOPY      ENDOSCOPY  07/31/2018    POLYPECTOMY  2002    STOMACH POLYP REMOVED    WISDOM TOOTH EXTRACTION  1997        Current Outpatient Medications:     albuterol sulfate  (90 Base) MCG/ACT inhaler, Inhale 2 puffs Every 4 (Four) Hours As Needed for Wheezing or Shortness of Air., Disp: 8 g, Rfl: 3    azelastine (ASTELIN) 0.1 % nasal spray, 2 sprays into the nostril(s) as directed by provider 2 (Two) Times a Day., Disp: 30 mL, Rfl: 1    busPIRone (BUSPAR) 10 MG tablet, Take 1 tablet by mouth 3 (Three) Times a Day., Disp: 90 tablet, Rfl: 0    citalopram (CeleXA) 40 MG tablet, Take 1 tablet by mouth Daily for 90 days., Disp: 90 tablet, Rfl: 0    Continuous Blood Gluc Sensor (FreeStyle Terrie 14 Day Sensor) mis, 1 each Every 14 (Fourteen) Days., Disp: 2 each, Rfl: 11    cyclobenzaprine (FLEXERIL) 10 MG tablet, TAKE ONE TABLET BY MOUTH EVERY NIGHT AT BEDTIME IF NEEDED FOR MUSCLE SPASM, Disp: 30 tablet, Rfl: 0    Dulaglutide (Trulicity) 3 MG/0.5ML solution pen-injector, Inject 0.5 mL under the skin into the appropriate area as directed 1 (One) Time Per Week., Disp: 6 mL, Rfl: 2    empagliflozin (Jardiance) 10 MG tablet tablet, Take 1 tablet by mouth Daily., Disp: 30 tablet, Rfl: 5    EPINEPHrine (EPIPEN) 0.3 MG/0.3ML solution auto-injector injection, Inject 0.3 mL into the appropriate muscle as directed by prescriber 1 (One) Time., Disp: , Rfl:     ergocalciferol (ERGOCALCIFEROL) 1.25 MG (74340 UT) capsule, Take 1 capsule by mouth 1 (One) Time Per Week., Disp: 13 capsule, Rfl: 1    fluticasone (FLONASE) 50 MCG/ACT nasal spray, 1 spray by Each Nare route Daily., Disp: 16 g, Rfl: 0    glucose blood (FREESTYLE LITE) test strip, USE AS  DIRECTED TO CHECK BLOOD SUGAR TWO TIMES A DAY, Disp: 100 each, Rfl: 5    hydrOXYzine (ATARAX) 25 MG tablet, Take 1 tablet by mouth 3 (Three) Times a Day As Needed for Anxiety., Disp: 30 tablet, Rfl: 0    ibuprofen (ADVIL,MOTRIN) 800 MG tablet, Take 1 tablet by mouth Every 6 (Six) Hours As Needed for Mild Pain., Disp: 30 tablet, Rfl: 0    ketoconazole (NIZORAL) 2 % shampoo, APPLY TOPICALLY TO THE AFFECTED AREA ONCE WEEKLY, Disp: 120 mL, Rfl: 2    Lancets (freestyle) lancets, USE TO TEST BLOOD SUGAR TWO TIMES A DAY, Disp: 100 each, Rfl: 5    loratadine (CLARITIN) 10 MG tablet, Claritin 10 mg oral tablet take 1 tablet (10 mg) by oral route once daily   Active, Disp: , Rfl:     meloxicam (MOBIC) 15 MG tablet, Take 1 tablet by mouth Daily., Disp: 30 tablet, Rfl: 5    montelukast (SINGULAIR) 10 MG tablet, Take 1 tablet by mouth Every Evening., Disp: 30 tablet, Rfl: 5    norgestimate-ethinyl estradiol (Tri-Estarylla) 0.18/0.215/0.25 MG-35 MCG per tablet, Take 1 tablet by mouth Daily., Disp: 28 tablet, Rfl: 3    Omeprazole (PRILOSEC PO), Take 1 tablet by mouth Daily As Needed., Disp: , Rfl:     pregabalin (LYRICA) 75 MG capsule, TAKE 1 CAPSULE TWICE A DAY BY ORAL ROUTE AS DIRECTED FOR 30 DAYS., Disp: , Rfl:     azithromycin (Zithromax Z-Jonathon) 250 MG tablet, Take 2 tablets by mouth on day 1, then 1 tablet daily on days 2-5, Disp: 6 tablet, Rfl: 0    fluconazole (Diflucan) 150 MG tablet, Take 1 tablet by mouth 1 (One) Time for 1 dose. One tablet PO now, repeat one tab PO in 72 hours, Disp: 2 tablet, Rfl: 0    levocetirizine (XYZAL) 5 MG tablet, Take 1 tablet by mouth Daily. (Patient not taking: Reported on 7/1/2024), Disp: , Rfl:     predniSONE (DELTASONE) 20 MG tablet, Take 1 tablet by mouth 2 (Two) Times a Day for 3 days., Disp: 6 tablet, Rfl: 0    traZODone (DESYREL) 50 MG tablet, Take 0.5 to 2 tab PO QHS PRN sleep (Patient not taking: Reported on 7/1/2024), Disp: 60 tablet, Rfl: 1    OBJECTIVE  Vital Signs:   /50    "Pulse 86   Ht 160 cm (62.99\")   Wt 107 kg (236 lb)   SpO2 97%   BMI 41.82 kg/m²    Estimated body mass index is 41.82 kg/m² as calculated from the following:    Height as of this encounter: 160 cm (62.99\").    Weight as of this encounter: 107 kg (236 lb).     Wt Readings from Last 3 Encounters:   07/01/24 107 kg (236 lb)   05/14/24 106 kg (234 lb)   02/13/24 105 kg (232 lb)     BP Readings from Last 3 Encounters:   07/01/24 105/50   05/14/24 119/73   02/13/24 98/64       Physical Exam  Vitals reviewed.   Constitutional:       Appearance: Normal appearance. She is well-developed.   HENT:      Head: Normocephalic and atraumatic.      Right Ear: Tympanic membrane, ear canal and external ear normal.      Left Ear: Tympanic membrane, ear canal and external ear normal.      Nose: Congestion present.      Comments: Sinus ttp      Mouth/Throat:      Pharynx: No oropharyngeal exudate or posterior oropharyngeal erythema.      Comments: Thick PND noted   Eyes:      Conjunctiva/sclera: Conjunctivae normal.      Pupils: Pupils are equal, round, and reactive to light.   Cardiovascular:      Rate and Rhythm: Normal rate and regular rhythm.      Heart sounds: No murmur heard.     No friction rub. No gallop.   Pulmonary:      Effort: Pulmonary effort is normal.      Breath sounds: Normal breath sounds. No wheezing or rhonchi.   Skin:     General: Skin is warm and dry.   Neurological:      Mental Status: She is alert and oriented to person, place, and time.      Cranial Nerves: No cranial nerve deficit.   Psychiatric:         Mood and Affect: Mood and affect normal.         Behavior: Behavior normal.         Thought Content: Thought content normal.         Judgment: Judgment normal.          Result Review    CMP          8/9/2023    11:10 11/14/2023    11:36 5/14/2024    11:38   CMP   Glucose 157  109  108    BUN 7  9  8    Creatinine 0.90  0.84  0.63    EGFR 81.5  88.0  112.3    Sodium 138  139  138    Potassium 4.0  4.2  4.3  "   Chloride 103  108  107    Calcium 9.8  9.0  8.9    Total Protein 7.2  7.2  6.6    Albumin 4.2  4.2  4.1    Globulin 3.0  3.0  2.5    Total Bilirubin 0.6  0.3  0.4    Alkaline Phosphatase 83  85  84    AST (SGOT) 21  19  19    ALT (SGPT) 18  17  16    Albumin/Globulin Ratio 1.4  1.4  1.6    BUN/Creatinine Ratio 7.8  10.7  12.7    Anion Gap 12.4  10.1  10.5      CBC          8/9/2023    11:10   CBC   WBC 9.79    RBC 4.69    Hemoglobin 15.1    Hematocrit 43.6    MCV 93.0    MCH 32.2    MCHC 34.6    RDW 12.6    Platelets 372      Lipid Panel          8/9/2023    11:10 11/14/2023    11:36 5/14/2024    11:38   Lipid Panel   Total Cholesterol 198  151  157    Triglycerides 135  131  147    HDL Cholesterol 51  39  39    VLDL Cholesterol 24  23  26    LDL Cholesterol  123  89  92    LDL/HDL Ratio 2.35  2.20  2.27      TSH          8/9/2023    11:10   TSH   TSH 1.410      Most Recent A1C          5/14/2024    11:38   HGBA1C Most Recent   Hemoglobin A1C 6.20          No Images in the past 120 days found..     The above data has been reviewed by NIKOLE Odom 07/01/2024 07:11 EDT.          Patient Care Team:  Nicolle Mcmaohn APRN as PCP - General (Nurse Practitioner)            ASSESSMENT & PLAN    Diagnoses and all orders for this visit:    1. Sinusitis, unspecified chronicity, unspecified location (Primary)  -     predniSONE (DELTASONE) 20 MG tablet; Take 1 tablet by mouth 2 (Two) Times a Day for 3 days.  Dispense: 6 tablet; Refill: 0  -     azithromycin (Zithromax Z-Jonathon) 250 MG tablet; Take 2 tablets by mouth on day 1, then 1 tablet daily on days 2-5  Dispense: 6 tablet; Refill: 0  -     fluconazole (Diflucan) 150 MG tablet; Take 1 tablet by mouth 1 (One) Time for 1 dose. One tablet PO now, repeat one tab PO in 72 hours  Dispense: 2 tablet; Refill: 0    2. Allergic rhinitis, unspecified seasonality, unspecified trigger  -     fluticasone (FLONASE) 50 MCG/ACT nasal spray; 1 spray by Each Nare route Daily.   Dispense: 16 g; Refill: 0    3. Asthma, unspecified asthma severity, unspecified whether complicated, unspecified whether persistent  -     albuterol sulfate  (90 Base) MCG/ACT inhaler; Inhale 2 puffs Every 4 (Four) Hours As Needed for Wheezing or Shortness of Air.  Dispense: 8 g; Refill: 3    Other orders  -     azelastine (ASTELIN) 0.1 % nasal spray; 2 sprays into the nostril(s) as directed by provider 2 (Two) Times a Day.  Dispense: 30 mL; Refill: 1         Tobacco Use: High Risk (5/14/2024)    Patient History     Smoking Tobacco Use: Some Days     Smokeless Tobacco Use: Never     Passive Exposure: Not on file       Follow Up     No follow-ups on file.        Patient was given instructions and counseling regarding her condition or for health maintenance advice. Please see specific information pulled into the AVS if appropriate.   I have reviewed information obtained and documented by others and I have confirmed the accuracy of this documented note.    NIKOLE Odom

## 2024-08-26 DIAGNOSIS — J30.9 ALLERGIC RHINITIS, UNSPECIFIED SEASONALITY, UNSPECIFIED TRIGGER: ICD-10-CM

## 2024-08-26 RX ORDER — FLUTICASONE PROPIONATE 50 MCG
1 SPRAY, SUSPENSION (ML) NASAL DAILY
Qty: 24 G | Refills: 1 | Status: SHIPPED | OUTPATIENT
Start: 2024-08-26

## 2024-08-31 DIAGNOSIS — E55.9 VITAMIN D DEFICIENCY: ICD-10-CM

## 2024-09-02 DIAGNOSIS — J32.9 SINUSITIS, UNSPECIFIED CHRONICITY, UNSPECIFIED LOCATION: ICD-10-CM

## 2024-09-03 RX ORDER — ERGOCALCIFEROL 1.25 MG/1
50000 CAPSULE, LIQUID FILLED ORAL
Qty: 13 CAPSULE | Refills: 1 | Status: SHIPPED | OUTPATIENT
Start: 2024-09-03

## 2024-09-03 RX ORDER — FLUCONAZOLE 150 MG/1
TABLET ORAL
Qty: 2 TABLET | Refills: 0 | OUTPATIENT
Start: 2024-09-03

## 2024-09-05 ENCOUNTER — OFFICE VISIT (OUTPATIENT)
Dept: FAMILY MEDICINE CLINIC | Facility: CLINIC | Age: 44
End: 2024-09-05
Payer: COMMERCIAL

## 2024-09-05 VITALS
WEIGHT: 232.8 LBS | OXYGEN SATURATION: 98 % | HEART RATE: 94 BPM | BODY MASS INDEX: 41.25 KG/M2 | DIASTOLIC BLOOD PRESSURE: 47 MMHG | HEIGHT: 63 IN | SYSTOLIC BLOOD PRESSURE: 101 MMHG

## 2024-09-05 DIAGNOSIS — R05.1 ACUTE COUGH: ICD-10-CM

## 2024-09-05 DIAGNOSIS — R09.81 NASAL CONGESTION: ICD-10-CM

## 2024-09-05 DIAGNOSIS — J34.89 SINUS PRESSURE: Primary | ICD-10-CM

## 2024-09-05 DIAGNOSIS — R51.9 SINUS HEADACHE: ICD-10-CM

## 2024-09-05 DIAGNOSIS — J32.9 VIRAL SINUSITIS: ICD-10-CM

## 2024-09-05 DIAGNOSIS — J30.9 ALLERGIC RHINITIS, UNSPECIFIED SEASONALITY, UNSPECIFIED TRIGGER: ICD-10-CM

## 2024-09-05 DIAGNOSIS — B97.89 VIRAL SINUSITIS: ICD-10-CM

## 2024-09-05 PROCEDURE — 99213 OFFICE O/P EST LOW 20 MIN: CPT

## 2024-09-05 RX ORDER — DOXYCYCLINE 100 MG/1
100 CAPSULE ORAL 2 TIMES DAILY
Qty: 14 CAPSULE | Refills: 0 | Status: CANCELLED | OUTPATIENT
Start: 2024-09-05 | End: 2024-09-12

## 2024-09-05 RX ORDER — ECHINACEA PURPUREA EXTRACT 125 MG
1 TABLET ORAL AS NEEDED
Qty: 30 ML | Refills: 1 | Status: SHIPPED | OUTPATIENT
Start: 2024-09-05

## 2024-09-05 RX ORDER — FLUTICASONE PROPIONATE 50 MCG
1 SPRAY, SUSPENSION (ML) NASAL DAILY
Qty: 24 G | Refills: 1 | Status: SHIPPED | OUTPATIENT
Start: 2024-09-05

## 2024-09-05 RX ORDER — BENZONATATE 100 MG/1
100 CAPSULE ORAL 3 TIMES DAILY PRN
Qty: 30 CAPSULE | Refills: 1 | Status: SHIPPED | OUTPATIENT
Start: 2024-09-05

## 2024-09-05 RX ORDER — AZELASTINE 1 MG/ML
2 SPRAY, METERED NASAL 2 TIMES DAILY
Qty: 30 ML | Refills: 1 | Status: SHIPPED | OUTPATIENT
Start: 2024-09-05

## 2024-09-05 RX ORDER — LORATADINE 10 MG/1
10 TABLET ORAL DAILY
Qty: 30 TABLET | Refills: 1 | Status: SHIPPED | OUTPATIENT
Start: 2024-09-05

## 2024-09-05 RX ORDER — GUAIFENESIN 600 MG/1
1200 TABLET, EXTENDED RELEASE ORAL 2 TIMES DAILY
Qty: 20 TABLET | Refills: 0 | Status: SHIPPED | OUTPATIENT
Start: 2024-09-05 | End: 2024-09-10

## 2024-09-05 NOTE — PROGRESS NOTES
Chief Complaint   Patient presents with    sinus pressure     Pt states these sx for about a week. Pt does use flonase and astelin nasal sprays.     Pt has quit taking most of her allergy medication as well.     Headache    Nasal Congestion        Subjective     Mary Go  has a past medical history of Allergic, Allergy, unspecified, initial encounter, Anemia, Ankle sprain, Arthritis, Arthritis of back (2002), Arthritis of neck (2002), Asthma, Cervical disc disorder, Colitis, ulcerative (2002), Colon polyp, Depression, Diabetes mellitus type 2 in nonobese (03/06/2018), Fracture of wrist, Fracture, finger (2018), Fracture, foot, Limb swelling, Low back pain, Obesity (03/06/2018), Pain in both knees (03/20/2018), Patellar tendinitis (03/20/2018), Psoriasis (07/30/2019), Reflux esophagitis, Seasonal allergies, Sinusitis, chronic, Skin disorder, SOB (shortness of breath), Tendonitis (03/20/2018), Thoracic disc disorder, and Wrist sprain.    NINA Hernandez is a 43-year-old female patient of my colleague Nicolle Mcmahon but today I am seeing her for an acute visit.    She presents today reporting sinus pressure, headache, cough, congestion, and drainage for about a week. She normally takes Astelin nose spray, Claritin, Flonase, and Singulair. She has not been able to take her Astelin nose spray, Flonase, or Claritin due to financial troubles currently.  States that she has always struggled with allergies since she was younger. Hasn't tried anything OTC aside from what she normally takes. Negative for COVID and flu in office today.  Instructed to call office early next week if not getting any better.  Allergies   Allergen Reactions    Metformin Nausea And Vomiting and GI Intolerance     Went to the ER after taking    Penicillins Unknown - High Severity     unknown    Sulfa Antibiotics Rash and Other (See Comments)     .    Latex Rash         Current Outpatient Medications:     albuterol sulfate  (90 Base)  MCG/ACT inhaler, Inhale 2 puffs Every 4 (Four) Hours As Needed for Wheezing or Shortness of Air., Disp: 8 g, Rfl: 3    azelastine (ASTELIN) 0.1 % nasal spray, 2 sprays into the nostril(s) as directed by provider 2 (Two) Times a Day., Disp: 30 mL, Rfl: 1    Continuous Blood Gluc Sensor (FreeStyle Terrie 14 Day Sensor) [facility], 1 each Every 14 (Fourteen) Days., Disp: 2 each, Rfl: 11    cyclobenzaprine (FLEXERIL) 10 MG tablet, TAKE ONE TABLET BY MOUTH EVERY NIGHT AT BEDTIME IF NEEDED FOR MUSCLE SPASM, Disp: 30 tablet, Rfl: 0    Dulaglutide (Trulicity) 3 MG/0.5ML solution pen-injector, Inject 0.5 mL under the skin into the appropriate area as directed 1 (One) Time Per Week., Disp: 6 mL, Rfl: 2    empagliflozin (Jardiance) 10 MG tablet tablet, Take 1 tablet by mouth Daily., Disp: 30 tablet, Rfl: 5    EPINEPHrine (EPIPEN) 0.3 MG/0.3ML solution auto-injector injection, Inject 0.3 mL into the appropriate muscle as directed by prescriber 1 (One) Time., Disp: , Rfl:     fluticasone (FLONASE) 50 MCG/ACT nasal spray, 1 spray by Each Nare route Daily., Disp: 24 g, Rfl: 1    glucose blood (FREESTYLE LITE) test strip, USE AS DIRECTED TO CHECK BLOOD SUGAR TWO TIMES A DAY, Disp: 100 each, Rfl: 5    ketoconazole (NIZORAL) 2 % shampoo, APPLY TOPICALLY TO THE AFFECTED AREA ONCE WEEKLY, Disp: 120 mL, Rfl: 2    Lancets (freestyle) lancets, USE TO TEST BLOOD SUGAR TWO TIMES A DAY, Disp: 100 each, Rfl: 5    loratadine (CLARITIN) 10 MG tablet, Take 1 tablet by mouth Daily., Disp: 30 tablet, Rfl: 1    meloxicam (MOBIC) 15 MG tablet, Take 1 tablet by mouth Daily., Disp: 30 tablet, Rfl: 5    montelukast (SINGULAIR) 10 MG tablet, Take 1 tablet by mouth Every Evening., Disp: 30 tablet, Rfl: 5    norgestimate-ethinyl estradiol (Tri-Estarylla) 0.18/0.215/0.25 MG-35 MCG per tablet, Take 1 tablet by mouth Daily., Disp: 28 tablet, Rfl: 3    Omeprazole (PRILOSEC PO), Take 1 tablet by mouth Daily As Needed., Disp: , Rfl:     pregabalin (LYRICA) 75 MG  capsule, TAKE 1 CAPSULE TWICE A DAY BY ORAL ROUTE AS DIRECTED FOR 30 DAYS., Disp: , Rfl:     vitamin D (ERGOCALCIFEROL) 1.25 MG (08297 UT) capsule capsule, TAKE 1 CAPSULE BY MOUTH 1 TIME PER WEEK., Disp: 13 capsule, Rfl: 1    azithromycin (Zithromax Z-Jonathon) 250 MG tablet, Take 2 tablets by mouth on day 1, then 1 tablet daily on days 2-5 (Patient not taking: Reported on 9/5/2024), Disp: 6 tablet, Rfl: 0    benzonatate (Tessalon Perles) 100 MG capsule, Take 1 capsule by mouth 3 (Three) Times a Day As Needed for Cough., Disp: 30 capsule, Rfl: 1    busPIRone (BUSPAR) 10 MG tablet, Take 1 tablet by mouth 3 (Three) Times a Day. (Patient not taking: Reported on 9/5/2024), Disp: 90 tablet, Rfl: 0    citalopram (CeleXA) 40 MG tablet, Take 1 tablet by mouth Daily for 90 days. (Patient not taking: Reported on 9/5/2024), Disp: 90 tablet, Rfl: 0    guaiFENesin (Mucinex) 600 MG 12 hr tablet, Take 2 tablets by mouth 2 (Two) Times a Day for 5 days., Disp: 20 tablet, Rfl: 0    hydrOXYzine (ATARAX) 25 MG tablet, Take 1 tablet by mouth 3 (Three) Times a Day As Needed for Anxiety. (Patient not taking: Reported on 9/5/2024), Disp: 30 tablet, Rfl: 0    ibuprofen (ADVIL,MOTRIN) 800 MG tablet, Take 1 tablet by mouth Every 6 (Six) Hours As Needed for Mild Pain. (Patient not taking: Reported on 9/5/2024), Disp: 30 tablet, Rfl: 0    levocetirizine (XYZAL) 5 MG tablet, Take 1 tablet by mouth Daily. (Patient not taking: Reported on 9/5/2024), Disp: , Rfl:     sodium chloride (Ocean Nasal Spray) 0.65 % nasal spray, 1 spray into the nostril(s) as directed by provider As Needed for Congestion., Disp: 30 mL, Rfl: 1    traZODone (DESYREL) 50 MG tablet, Take 0.5 to 2 tab PO QHS PRN sleep (Patient not taking: Reported on 7/1/2024), Disp: 60 tablet, Rfl: 1    Patient Active Problem List   Diagnosis    Allergy    Anemia    Osteoarthritis    Asthma    Class 3 severe obesity due to excess calories with serious comorbidity and body mass index (BMI) of 40.0  to 44.9 in adult    Pain in both knees    Sinusitis, chronic    Shortness of breath    Type 2 diabetes mellitus    Esophageal reflux    Colitis, ulcerative    Patellar tendinitis    Psoriasis    Vitamin D deficiency    Allergic rhinitis    Family history of pseudocholinesterase deficiency    Anxiety and depression    Nausea        Past Surgical History:   Procedure Laterality Date    CERVICAL POLYPECTOMY  2001    COLONOSCOPY  07/13/2018    REPEAT IN 3 YEARS ([facility]ETH)    DIAGNOSTIC LAPAROSCOPY      ENDOSCOPY  07/31/2018    POLYPECTOMY  2002    STOMACH POLYP REMOVED    WISDOM TOOTH EXTRACTION  1997       Social History     Socioeconomic History    Marital status: Single   Tobacco Use    Smoking status: Some Days     Current packs/day: 0.50     Average packs/day: 0.5 packs/day for 10.0 years (5.0 ttl pk-yrs)     Types: Cigarettes    Smokeless tobacco: Never    Tobacco comments:     Quit smoking over 9 years ago   Vaping Use    Vaping status: Never Used   Substance and Sexual Activity    Alcohol use: Yes     Comment: occasional    Drug use: Never    Sexual activity: Not Currently     Partners: Male     Birth control/protection: Condom, Pill       Family History   Problem Relation Age of Onset    Diabetes Mother         UNSPECIFIED TYPE    Arthritis Mother     Osteoporosis Mother     Anesthesia problems Mother         Pseudocholinesterase    Broken bones Mother     Kidney disease Mother     Stroke Mother     Stroke Sister     Diabetes Sister         UNSPECIFIED TYPE    Arthritis Sister     Osteoporosis Sister     Breast cancer Maternal Grandmother         MALIGNANT    Heart disease Maternal Grandmother     Cancer Maternal Grandmother         Breast    Hearing loss Maternal Grandmother     Kidney disease Maternal Grandmother     Stroke Maternal Grandfather     Heart disease Maternal Grandfather     Breast cancer Paternal Grandmother         MALIGNANT    Cancer Paternal Grandmother     Cancer Paternal Grandfather          "UNSPECIFIED    COPD Other     Emphysema Other     Diabetes Other     Diabetes Sister     Liver disease Sister     Miscarriages / Stillbirths Sister        Family history, surgical history, past medical history, Allergies and med's reviewed with patient today and updated in [facility] EMR.     ROS:  Review of Systems   HENT:  Positive for congestion, postnasal drip and sinus pressure.    Respiratory:  Positive for cough.    Cardiovascular: Negative.    Neurological:  Positive for headache.       OBJECTIVE:  Vitals:    09/05/24 1357   BP: 101/47   Pulse: 94   SpO2: 98%   Weight: 106 kg (232 lb 12.8 oz)   Height: 160 cm (62.99\")     Body mass index is 41.25 kg/m².  No LMP recorded.    Physical Exam  HENT:      Right Ear: A middle ear effusion is present. Tympanic membrane is injected.      Left Ear: A middle ear effusion is present. Tympanic membrane is injected.      Nose:      Right Sinus: Maxillary sinus tenderness (a little) and frontal sinus tenderness (a little) present.      Left Sinus: Maxillary sinus tenderness (a little) and frontal sinus tenderness (a little) present.      Mouth/Throat:      Pharynx: Posterior oropharyngeal erythema present.      Comments: cobblestoning  Cardiovascular:      Rate and Rhythm: Normal rate and regular rhythm.      Pulses: Normal pulses.      Heart sounds: Normal heart sounds.   Pulmonary:      Effort: Pulmonary effort is normal.      Breath sounds: Normal breath sounds.                Health Maintenance Due   Topic Date Due    Hepatitis B (1 of 3 - 19+ 3-dose series) Never done    ANNUAL PHYSICAL  07/27/2024    DIABETIC FOOT EXAM  07/27/2024    COVID-19 Vaccine (3 - 2023-24 season) 09/01/2024    INFLUENZA VACCINE  08/01/2024    HEMOGLOBIN A1C  11/14/2024        No visits with results within 30 Day(s) from this visit.   Latest known visit with results is:   Lab on 05/14/2024   Component Date Value Ref Range Status    Hemoglobin A1C 05/14/2024 6.20 (H)  4.80 - 5.60 % Final    Glucose " 05/14/2024 108 (H)  65 - 99 mg/dL Final    BUN 05/14/2024 8  6 - 20 mg/dL Final    Creatinine 05/14/2024 0.63  0.57 - 1.00 mg/dL Final    Sodium 05/14/2024 138  136 - 145 mmol/L Final    Potassium 05/14/2024 4.3  3.5 - 5.2 mmol/L Final    Chloride 05/14/2024 107  98 - 107 mmol/L Final    CO2 05/14/2024 20.5 (L)  22.0 - 29.0 mmol/L Final    Calcium 05/14/2024 8.9  8.6 - 10.5 mg/dL Final    Total Protein 05/14/2024 6.6  6.0 - 8.5 g/dL Final    Albumin 05/14/2024 4.1  3.5 - 5.2 g/dL Final    ALT (SGPT) 05/14/2024 16  1 - 33 U/L Final    AST (SGOT) 05/14/2024 19  1 - 32 U/L Final    Alkaline Phosphatase 05/14/2024 84  39 - 117 U/L Final    Total Bilirubin 05/14/2024 0.4  0.0 - 1.2 mg/dL Final    Globulin 05/14/2024 2.5  gm/dL Final    A/G Ratio 05/14/2024 1.6  g/dL Final    BUN/Creatinine Ratio 05/14/2024 12.7  7.0 - 25.0 Final    Anion Gap 05/14/2024 10.5  5.0 - 15.0 mmol/L Final    eGFR 05/14/2024 112.3  >60.0 mL/min/1.73 Final    Total Cholesterol 05/14/2024 157  0 - 200 mg/dL Final    Triglycerides 05/14/2024 147  0 - 150 mg/dL Final    HDL Cholesterol 05/14/2024 39 (L)  40 - 60 mg/dL Final    LDL Cholesterol  05/14/2024 92  0 - 100 mg/dL Final    VLDL Cholesterol 05/14/2024 26  5 - 40 mg/dL Final    LDL/HDL Ratio 05/14/2024 2.27   Final    Microalbumin/Creatinine Ratio 05/14/2024    Final    Unable to calculate    Creatinine, Urine 05/14/2024 143.1  mg/dL Final    Microalbumin, Urine 05/14/2024 <1.2  mg/dL Final    25 Hydroxy, Vitamin D 05/14/2024 24.8 (L)  30.0 - 100.0 ng/ml Final      Advised that symptoms start to improve within 3 to 4 days; if they do not improve, patient should call back or follow-up with PCP.  Advised use of decongestant for sinus pressure.  Advised a nasal saline irrigation if patient tolerates. Can take tylenol or advil for fever or pain. Return to clinic if new symptoms develop.     ASSESSMENT/ PLAN:    Diagnoses and all orders for this visit:    1. Sinus pressure (Primary)  -     sodium  chloride (Ocean Nasal Spray) 0.65 % nasal spray; 1 spray into the nostril(s) as directed by provider As Needed for Congestion.  Dispense: 30 mL; Refill: 1  -     guaiFENesin (Mucinex) 600 MG 12 hr tablet; Take 2 tablets by mouth 2 (Two) Times a Day for 5 days.  Dispense: 20 tablet; Refill: 0    2. Nasal congestion  -     sodium chloride (Ocean Nasal Spray) 0.65 % nasal spray; 1 spray into the nostril(s) as directed by provider As Needed for Congestion.  Dispense: 30 mL; Refill: 1  -     guaiFENesin (Mucinex) 600 MG 12 hr tablet; Take 2 tablets by mouth 2 (Two) Times a Day for 5 days.  Dispense: 20 tablet; Refill: 0  -     azelastine (ASTELIN) 0.1 % nasal spray; 2 sprays into the nostril(s) as directed by provider 2 (Two) Times a Day.  Dispense: 30 mL; Refill: 1  -     fluticasone (FLONASE) 50 MCG/ACT nasal spray; 1 spray by Each Nare route Daily.  Dispense: 24 g; Refill: 1    3. Sinus headache    4. Allergic rhinitis, unspecified seasonality, unspecified trigger  -     azelastine (ASTELIN) 0.1 % nasal spray; 2 sprays into the nostril(s) as directed by provider 2 (Two) Times a Day.  Dispense: 30 mL; Refill: 1  -     fluticasone (FLONASE) 50 MCG/ACT nasal spray; 1 spray by Each Nare route Daily.  Dispense: 24 g; Refill: 1  -     loratadine (CLARITIN) 10 MG tablet; Take 1 tablet by mouth Daily.  Dispense: 30 tablet; Refill: 1    5. Viral sinusitis    6. Acute cough  -     benzonatate (Tessalon Perles) 100 MG capsule; Take 1 capsule by mouth 3 (Three) Times a Day As Needed for Cough.  Dispense: 30 capsule; Refill: 1        Orders Placed Today:     New Medications Ordered This Visit   Medications    sodium chloride (Ocean Nasal Spray) 0.65 % nasal spray     Si spray into the nostril(s) as directed by provider As Needed for Congestion.     Dispense:  30 mL     Refill:  1    guaiFENesin (Mucinex) 600 MG 12 hr tablet     Sig: Take 2 tablets by mouth 2 (Two) Times a Day for 5 days.     Dispense:  20 tablet     Refill:  0     azelastine (ASTELIN) 0.1 % nasal spray     Si sprays into the nostril(s) as directed by provider 2 (Two) Times a Day.     Dispense:  30 mL     Refill:  1    fluticasone (FLONASE) 50 MCG/ACT nasal spray     Si spray by Each Nare route Daily.     Dispense:  24 g     Refill:  1    loratadine (CLARITIN) 10 MG tablet     Sig: Take 1 tablet by mouth Daily.     Dispense:  30 tablet     Refill:  1    benzonatate (Tessalon Perles) 100 MG capsule     Sig: Take 1 capsule by mouth 3 (Three) Times a Day As Needed for Cough.     Dispense:  30 capsule     Refill:  1        Management Plan:     An After Visit Summary was printed and given to the patient at discharge.    Follow-up: Return if symptoms worsen or fail to improve.    NIKOLE Girard 2024 14:37 EDT  This note was electronically signed.

## 2024-11-18 ENCOUNTER — LAB (OUTPATIENT)
Dept: LAB | Facility: HOSPITAL | Age: 44
End: 2024-11-18
Payer: COMMERCIAL

## 2024-11-18 ENCOUNTER — OFFICE VISIT (OUTPATIENT)
Dept: FAMILY MEDICINE CLINIC | Facility: CLINIC | Age: 44
End: 2024-11-18
Payer: COMMERCIAL

## 2024-11-18 ENCOUNTER — TELEPHONE (OUTPATIENT)
Dept: FAMILY MEDICINE CLINIC | Facility: CLINIC | Age: 44
End: 2024-11-18

## 2024-11-18 VITALS
HEART RATE: 92 BPM | OXYGEN SATURATION: 98 % | WEIGHT: 226 LBS | DIASTOLIC BLOOD PRESSURE: 62 MMHG | HEIGHT: 63 IN | SYSTOLIC BLOOD PRESSURE: 122 MMHG | BODY MASS INDEX: 40.04 KG/M2

## 2024-11-18 DIAGNOSIS — E55.9 VITAMIN D DEFICIENCY: ICD-10-CM

## 2024-11-18 DIAGNOSIS — R09.81 NASAL CONGESTION: ICD-10-CM

## 2024-11-18 DIAGNOSIS — Z30.09 FAMILY PLANNING: Primary | ICD-10-CM

## 2024-11-18 DIAGNOSIS — L40.9 PSORIASIS: ICD-10-CM

## 2024-11-18 DIAGNOSIS — J30.9 ALLERGIC RHINITIS, UNSPECIFIED SEASONALITY, UNSPECIFIED TRIGGER: ICD-10-CM

## 2024-11-18 DIAGNOSIS — Z30.09 FAMILY PLANNING: ICD-10-CM

## 2024-11-18 DIAGNOSIS — F33.41 RECURRENT MAJOR DEPRESSIVE DISORDER, IN PARTIAL REMISSION: ICD-10-CM

## 2024-11-18 DIAGNOSIS — F41.1 GENERALIZED ANXIETY DISORDER: ICD-10-CM

## 2024-11-18 DIAGNOSIS — F43.10 POST TRAUMATIC STRESS DISORDER (PTSD): ICD-10-CM

## 2024-11-18 DIAGNOSIS — E11.65 TYPE 2 DIABETES MELLITUS WITH HYPERGLYCEMIA, WITHOUT LONG-TERM CURRENT USE OF INSULIN: ICD-10-CM

## 2024-11-18 DIAGNOSIS — J45.20 MILD INTERMITTENT ASTHMA, UNSPECIFIED WHETHER COMPLICATED: ICD-10-CM

## 2024-11-18 DIAGNOSIS — M19.90 OSTEOARTHRITIS, UNSPECIFIED OSTEOARTHRITIS TYPE, UNSPECIFIED SITE: ICD-10-CM

## 2024-11-18 DIAGNOSIS — Z12.31 BREAST CANCER SCREENING BY MAMMOGRAM: ICD-10-CM

## 2024-11-18 DIAGNOSIS — F33.0 MILD EPISODE OF RECURRENT MAJOR DEPRESSIVE DISORDER: ICD-10-CM

## 2024-11-18 LAB
ALBUMIN SERPL-MCNC: 4.2 G/DL (ref 3.5–5.2)
ALBUMIN UR-MCNC: 1.3 MG/DL
ALBUMIN/GLOB SERPL: 1.3 G/DL
ALP SERPL-CCNC: 105 U/L (ref 39–117)
ALT SERPL W P-5'-P-CCNC: 13 U/L (ref 1–33)
ANION GAP SERPL CALCULATED.3IONS-SCNC: 11.3 MMOL/L (ref 5–15)
AST SERPL-CCNC: 13 U/L (ref 1–32)
BASOPHILS # BLD AUTO: 0.08 10*3/MM3 (ref 0–0.2)
BASOPHILS NFR BLD AUTO: 0.5 % (ref 0–1.5)
BILIRUB SERPL-MCNC: 0.4 MG/DL (ref 0–1.2)
BUN SERPL-MCNC: 7 MG/DL (ref 6–20)
BUN/CREAT SERPL: 6.5 (ref 7–25)
CALCIUM SPEC-SCNC: 9.7 MG/DL (ref 8.6–10.5)
CHLORIDE SERPL-SCNC: 107 MMOL/L (ref 98–107)
CHOLEST SERPL-MCNC: 175 MG/DL (ref 0–200)
CO2 SERPL-SCNC: 22.7 MMOL/L (ref 22–29)
CREAT SERPL-MCNC: 1.07 MG/DL (ref 0.57–1)
CREAT UR-MCNC: 196.3 MG/DL
DEPRECATED RDW RBC AUTO: 41 FL (ref 37–54)
EGFRCR SERPLBLD CKD-EPI 2021: 65.4 ML/MIN/1.73
EOSINOPHIL # BLD AUTO: 0.18 10*3/MM3 (ref 0–0.4)
EOSINOPHIL NFR BLD AUTO: 1.2 % (ref 0.3–6.2)
ERYTHROCYTE [DISTWIDTH] IN BLOOD BY AUTOMATED COUNT: 12.1 % (ref 12.3–15.4)
GLOBULIN UR ELPH-MCNC: 3.3 GM/DL
GLUCOSE SERPL-MCNC: 109 MG/DL (ref 65–99)
HBA1C MFR BLD: 6.8 % (ref 4.8–5.6)
HCT VFR BLD AUTO: 46.7 % (ref 34–46.6)
HDLC SERPL-MCNC: 45 MG/DL (ref 40–60)
HGB BLD-MCNC: 16.1 G/DL (ref 12–15.9)
IMM GRANULOCYTES # BLD AUTO: 0.05 10*3/MM3 (ref 0–0.05)
IMM GRANULOCYTES NFR BLD AUTO: 0.3 % (ref 0–0.5)
LDLC SERPL CALC-MCNC: 114 MG/DL (ref 0–100)
LDLC/HDLC SERPL: 2.51 {RATIO}
LYMPHOCYTES # BLD AUTO: 3.52 10*3/MM3 (ref 0.7–3.1)
LYMPHOCYTES NFR BLD AUTO: 23.2 % (ref 19.6–45.3)
MCH RBC QN AUTO: 32.1 PG (ref 26.6–33)
MCHC RBC AUTO-ENTMCNC: 34.5 G/DL (ref 31.5–35.7)
MCV RBC AUTO: 93 FL (ref 79–97)
MICROALBUMIN/CREAT UR: 6.6 MG/G (ref 0–29)
MONOCYTES # BLD AUTO: 0.79 10*3/MM3 (ref 0.1–0.9)
MONOCYTES NFR BLD AUTO: 5.2 % (ref 5–12)
NEUTROPHILS NFR BLD AUTO: 10.55 10*3/MM3 (ref 1.7–7)
NEUTROPHILS NFR BLD AUTO: 69.6 % (ref 42.7–76)
NRBC BLD AUTO-RTO: 0 /100 WBC (ref 0–0.2)
PLATELET # BLD AUTO: 376 10*3/MM3 (ref 140–450)
PMV BLD AUTO: 10.2 FL (ref 6–12)
POTASSIUM SERPL-SCNC: 4 MMOL/L (ref 3.5–5.2)
PROT SERPL-MCNC: 7.5 G/DL (ref 6–8.5)
RBC # BLD AUTO: 5.02 10*6/MM3 (ref 3.77–5.28)
SODIUM SERPL-SCNC: 141 MMOL/L (ref 136–145)
TRIGL SERPL-MCNC: 86 MG/DL (ref 0–150)
TSH SERPL DL<=0.05 MIU/L-ACNC: 1.09 UIU/ML (ref 0.27–4.2)
VLDLC SERPL-MCNC: 16 MG/DL (ref 5–40)
WBC NRBC COR # BLD AUTO: 15.17 10*3/MM3 (ref 3.4–10.8)

## 2024-11-18 PROCEDURE — 80061 LIPID PANEL: CPT

## 2024-11-18 PROCEDURE — 82043 UR ALBUMIN QUANTITATIVE: CPT

## 2024-11-18 PROCEDURE — 99214 OFFICE O/P EST MOD 30 MIN: CPT | Performed by: NURSE PRACTITIONER

## 2024-11-18 PROCEDURE — 36415 COLL VENOUS BLD VENIPUNCTURE: CPT

## 2024-11-18 PROCEDURE — 83036 HEMOGLOBIN GLYCOSYLATED A1C: CPT

## 2024-11-18 PROCEDURE — 80050 GENERAL HEALTH PANEL: CPT

## 2024-11-18 PROCEDURE — 82570 ASSAY OF URINE CREATININE: CPT

## 2024-11-18 RX ORDER — CITALOPRAM HYDROBROMIDE 40 MG/1
40 TABLET ORAL DAILY
Qty: 90 TABLET | Refills: 1 | Status: SHIPPED | OUTPATIENT
Start: 2024-11-18 | End: 2024-11-18 | Stop reason: SDUPTHER

## 2024-11-18 RX ORDER — ERGOCALCIFEROL 1.25 MG/1
50000 CAPSULE, LIQUID FILLED ORAL
Qty: 13 CAPSULE | Refills: 1 | Status: SHIPPED | OUTPATIENT
Start: 2024-11-18 | End: 2024-11-18 | Stop reason: SDUPTHER

## 2024-11-18 RX ORDER — MELOXICAM 15 MG/1
15 TABLET ORAL DAILY
Qty: 30 TABLET | Refills: 5 | Status: SHIPPED | OUTPATIENT
Start: 2024-11-18 | End: 2024-11-18 | Stop reason: SDUPTHER

## 2024-11-18 RX ORDER — FLUTICASONE PROPIONATE 50 MCG
1 SPRAY, SUSPENSION (ML) NASAL DAILY
Qty: 24 G | Refills: 1 | Status: SHIPPED | OUTPATIENT
Start: 2024-11-18

## 2024-11-18 RX ORDER — MONTELUKAST SODIUM 10 MG/1
10 TABLET ORAL EVERY EVENING
Qty: 30 TABLET | Refills: 5 | Status: SHIPPED | OUTPATIENT
Start: 2024-11-18 | End: 2024-11-18 | Stop reason: SDUPTHER

## 2024-11-18 RX ORDER — AZELASTINE 1 MG/ML
2 SPRAY, METERED NASAL 2 TIMES DAILY
Qty: 30 ML | Refills: 1 | Status: SHIPPED | OUTPATIENT
Start: 2024-11-18 | End: 2024-11-18 | Stop reason: SDUPTHER

## 2024-11-18 RX ORDER — LORATADINE 10 MG/1
10 TABLET ORAL DAILY
Qty: 90 TABLET | Refills: 1 | Status: SHIPPED | OUTPATIENT
Start: 2024-11-18 | End: 2024-11-18 | Stop reason: SDUPTHER

## 2024-11-18 RX ORDER — BLOOD-GLUCOSE METER
KIT MISCELLANEOUS
Qty: 100 EACH | Refills: 5 | Status: SHIPPED | OUTPATIENT
Start: 2024-11-18 | End: 2024-11-18 | Stop reason: SDUPTHER

## 2024-11-18 RX ORDER — ERGOCALCIFEROL 1.25 MG/1
50000 CAPSULE, LIQUID FILLED ORAL
Qty: 13 CAPSULE | Refills: 1 | Status: SHIPPED | OUTPATIENT
Start: 2024-11-18

## 2024-11-18 RX ORDER — LORATADINE 10 MG/1
10 TABLET ORAL DAILY
Qty: 30 TABLET | Refills: 1 | Status: SHIPPED | OUTPATIENT
Start: 2024-11-18 | End: 2024-11-18 | Stop reason: SDUPTHER

## 2024-11-18 RX ORDER — BLOOD-GLUCOSE METER
KIT MISCELLANEOUS
Qty: 100 EACH | Refills: 5 | Status: SHIPPED | OUTPATIENT
Start: 2024-11-18

## 2024-11-18 RX ORDER — NORGESTIMATE AND ETHINYL ESTRADIOL 7DAYSX3 28
1 KIT ORAL DAILY
Qty: 28 TABLET | Refills: 3 | Status: SHIPPED | OUTPATIENT
Start: 2024-11-18 | End: 2024-11-18 | Stop reason: SDUPTHER

## 2024-11-18 RX ORDER — MONTELUKAST SODIUM 10 MG/1
10 TABLET ORAL EVERY EVENING
Qty: 90 TABLET | Refills: 1 | Status: SHIPPED | OUTPATIENT
Start: 2024-11-18

## 2024-11-18 RX ORDER — LORATADINE 10 MG/1
10 TABLET ORAL DAILY
Qty: 90 TABLET | Refills: 1 | Status: SHIPPED | OUTPATIENT
Start: 2024-11-18

## 2024-11-18 RX ORDER — AZELASTINE 1 MG/ML
2 SPRAY, METERED NASAL 2 TIMES DAILY
Qty: 30 ML | Refills: 1 | Status: SHIPPED | OUTPATIENT
Start: 2024-11-18

## 2024-11-18 RX ORDER — FLUTICASONE PROPIONATE 50 MCG
1 SPRAY, SUSPENSION (ML) NASAL DAILY
Qty: 24 G | Refills: 1 | Status: SHIPPED | OUTPATIENT
Start: 2024-11-18 | End: 2024-11-18 | Stop reason: SDUPTHER

## 2024-11-18 RX ORDER — BUSPIRONE HYDROCHLORIDE 10 MG/1
10 TABLET ORAL 3 TIMES DAILY
Qty: 90 TABLET | Refills: 2 | Status: SHIPPED | OUTPATIENT
Start: 2024-11-18

## 2024-11-18 RX ORDER — CITALOPRAM HYDROBROMIDE 40 MG/1
40 TABLET ORAL DAILY
Qty: 90 TABLET | Refills: 1 | Status: SHIPPED | OUTPATIENT
Start: 2024-11-18

## 2024-11-18 RX ORDER — LANCETS 28 GAUGE
EACH MISCELLANEOUS
Qty: 100 EACH | Refills: 5 | Status: SHIPPED | OUTPATIENT
Start: 2024-11-18

## 2024-11-18 RX ORDER — NORGESTIMATE AND ETHINYL ESTRADIOL 7DAYSX3 28
1 KIT ORAL DAILY
Qty: 28 TABLET | Refills: 3 | Status: SHIPPED | OUTPATIENT
Start: 2024-11-18

## 2024-11-18 RX ORDER — MELOXICAM 15 MG/1
15 TABLET ORAL DAILY
Qty: 30 TABLET | Refills: 5 | Status: SHIPPED | OUTPATIENT
Start: 2024-11-18

## 2024-11-18 RX ORDER — KETOCONAZOLE 20 MG/ML
SHAMPOO TOPICAL 2 TIMES WEEKLY
Qty: 120 ML | Refills: 2 | Status: SHIPPED | OUTPATIENT
Start: 2024-11-18

## 2024-11-18 RX ORDER — KETOCONAZOLE 20 MG/ML
SHAMPOO TOPICAL 2 TIMES WEEKLY
Qty: 120 ML | Refills: 2 | Status: SHIPPED | OUTPATIENT
Start: 2024-11-18 | End: 2024-11-18 | Stop reason: SDUPTHER

## 2024-11-18 RX ORDER — BUSPIRONE HYDROCHLORIDE 10 MG/1
10 TABLET ORAL 3 TIMES DAILY
Qty: 90 TABLET | Refills: 2 | Status: SHIPPED | OUTPATIENT
Start: 2024-11-18 | End: 2024-11-18 | Stop reason: SDUPTHER

## 2024-11-18 NOTE — TELEPHONE ENCOUNTER
Patient called office stating MA sent all medication to the wrong PHARM. Patient is needing them sent to [facility] [place].     [facility]/pharmacy #[id number] - [place], [place] - [address] - [phone]  - [phone] FX  [address] [place] [address]  Phone: [phone]  Fax: [phone]   ALL MEDS ARE   empagliflozin (Jardiance) 10 MG tablet     fluticasone (FLONASE) 50 MCG/ACT nasal spray     glucose blood (FREESTYLE LITE) test strip     ketoconazole (NIZORAL) 2 % shampoo     meloxicam (MOBIC) 15 MG tablet     montelukast (SINGULAIR) 10 MG tablet     norgestimate-ethinyl estradiol (Tri-Estarylla) 0.18/0.215/0.25 MG-35 MCG per tablet     vitamin D (ERGOCALCIFEROL) 1.25 MG     Blood Glucose Monitoring Suppl (D-Care Glucometer) w/Device kit       busPIRone (BUSPAR) 10 MG tablet     citalopram (CeleXA) 40 MG tablet     azelastine (ASTELIN) 0.1 % nasal spray     loratadine (CLARITIN) 10 MG tablet

## 2024-11-19 DIAGNOSIS — D72.829 LEUKOCYTOSIS, UNSPECIFIED TYPE: ICD-10-CM

## 2024-11-19 DIAGNOSIS — N28.9 DECREASED RENAL FUNCTION: Primary | ICD-10-CM

## 2024-12-02 ENCOUNTER — HOSPITAL ENCOUNTER (OUTPATIENT)
Dept: MAMMOGRAPHY | Facility: HOSPITAL | Age: 44
Discharge: HOME | End: 2024-12-02
Admitting: NURSE PRACTITIONER
Payer: COMMERCIAL

## 2024-12-02 DIAGNOSIS — Z12.31 BREAST CANCER SCREENING BY MAMMOGRAM: ICD-10-CM

## 2024-12-02 PROCEDURE — 77067 SCR MAMMO BI INCL CAD: CPT

## 2024-12-02 PROCEDURE — 77063 BREAST TOMOSYNTHESIS BI: CPT

## 2024-12-31 ENCOUNTER — LAB (OUTPATIENT)
Dept: LAB | Facility: HOSPITAL | Age: 44
End: 2024-12-31
Payer: COMMERCIAL

## 2024-12-31 DIAGNOSIS — D72.829 LEUKOCYTOSIS, UNSPECIFIED TYPE: ICD-10-CM

## 2024-12-31 DIAGNOSIS — N28.9 DECREASED RENAL FUNCTION: ICD-10-CM

## 2024-12-31 LAB
ANION GAP SERPL CALCULATED.3IONS-SCNC: 10 MMOL/L (ref 5–15)
BASOPHILS # BLD AUTO: 0.08 10*3/MM3 (ref 0–0.2)
BASOPHILS NFR BLD AUTO: 0.6 % (ref 0–1.5)
BUN SERPL-MCNC: 11 MG/DL (ref 6–20)
BUN/CREAT SERPL: 11.7 (ref 7–25)
CALCIUM SPEC-SCNC: 9.2 MG/DL (ref 8.6–10.5)
CHLORIDE SERPL-SCNC: 105 MMOL/L (ref 98–107)
CO2 SERPL-SCNC: 23 MMOL/L (ref 22–29)
CREAT SERPL-MCNC: 0.94 MG/DL (ref 0.57–1)
DEPRECATED RDW RBC AUTO: 40.2 FL (ref 37–54)
EGFRCR SERPLBLD CKD-EPI 2021: 76.4 ML/MIN/1.73
EOSINOPHIL # BLD AUTO: 0.27 10*3/MM3 (ref 0–0.4)
EOSINOPHIL NFR BLD AUTO: 2 % (ref 0.3–6.2)
ERYTHROCYTE [DISTWIDTH] IN BLOOD BY AUTOMATED COUNT: 11.7 % (ref 12.3–15.4)
GLUCOSE SERPL-MCNC: 226 MG/DL (ref 65–99)
HCT VFR BLD AUTO: 44.5 % (ref 34–46.6)
HGB BLD-MCNC: 15.3 G/DL (ref 12–15.9)
IMM GRANULOCYTES # BLD AUTO: 0.06 10*3/MM3 (ref 0–0.05)
IMM GRANULOCYTES NFR BLD AUTO: 0.4 % (ref 0–0.5)
LYMPHOCYTES # BLD AUTO: 3.49 10*3/MM3 (ref 0.7–3.1)
LYMPHOCYTES NFR BLD AUTO: 26.1 % (ref 19.6–45.3)
MCH RBC QN AUTO: 32.6 PG (ref 26.6–33)
MCHC RBC AUTO-ENTMCNC: 34.4 G/DL (ref 31.5–35.7)
MCV RBC AUTO: 94.7 FL (ref 79–97)
MONOCYTES # BLD AUTO: 0.6 10*3/MM3 (ref 0.1–0.9)
MONOCYTES NFR BLD AUTO: 4.5 % (ref 5–12)
NEUTROPHILS NFR BLD AUTO: 66.4 % (ref 42.7–76)
NEUTROPHILS NFR BLD AUTO: 8.85 10*3/MM3 (ref 1.7–7)
NRBC BLD AUTO-RTO: 0 /100 WBC (ref 0–0.2)
PLATELET # BLD AUTO: 344 10*3/MM3 (ref 140–450)
PMV BLD AUTO: 10.3 FL (ref 6–12)
POTASSIUM SERPL-SCNC: 3.8 MMOL/L (ref 3.5–5.2)
RBC # BLD AUTO: 4.7 10*6/MM3 (ref 3.77–5.28)
SODIUM SERPL-SCNC: 138 MMOL/L (ref 136–145)
WBC NRBC COR # BLD AUTO: 13.35 10*3/MM3 (ref 3.4–10.8)

## 2024-12-31 PROCEDURE — 85025 COMPLETE CBC W/AUTO DIFF WBC: CPT

## 2024-12-31 PROCEDURE — 80048 BASIC METABOLIC PNL TOTAL CA: CPT

## 2024-12-31 PROCEDURE — 36415 COLL VENOUS BLD VENIPUNCTURE: CPT

## 2025-01-02 DIAGNOSIS — D72.829 LEUKOCYTOSIS, UNSPECIFIED TYPE: Primary | ICD-10-CM

## 2025-03-11 DIAGNOSIS — Z30.09 FAMILY PLANNING: ICD-10-CM

## 2025-03-11 RX ORDER — NORGESTIMATE AND ETHINYL ESTRADIOL 7DAYSX3 28
1 KIT ORAL DAILY
Qty: 84 TABLET | Refills: 0 | Status: SHIPPED | OUTPATIENT
Start: 2025-03-11

## 2025-03-18 ENCOUNTER — OFFICE VISIT (OUTPATIENT)
Dept: FAMILY MEDICINE CLINIC | Facility: CLINIC | Age: 45
End: 2025-03-18
Payer: COMMERCIAL

## 2025-03-18 VITALS — HEIGHT: 63 IN | WEIGHT: 220 LBS | BODY MASS INDEX: 38.98 KG/M2

## 2025-03-18 DIAGNOSIS — E66.01 CLASS 2 SEVERE OBESITY WITH SERIOUS COMORBIDITY AND BODY MASS INDEX (BMI) OF 38.0 TO 38.9 IN ADULT, UNSPECIFIED OBESITY TYPE: ICD-10-CM

## 2025-03-18 DIAGNOSIS — F41.1 GENERALIZED ANXIETY DISORDER: ICD-10-CM

## 2025-03-18 DIAGNOSIS — Z30.09 FAMILY PLANNING: ICD-10-CM

## 2025-03-18 DIAGNOSIS — E11.65 TYPE 2 DIABETES MELLITUS WITH HYPERGLYCEMIA, WITHOUT LONG-TERM CURRENT USE OF INSULIN: Primary | ICD-10-CM

## 2025-03-18 DIAGNOSIS — E66.812 CLASS 2 SEVERE OBESITY WITH SERIOUS COMORBIDITY AND BODY MASS INDEX (BMI) OF 38.0 TO 38.9 IN ADULT, UNSPECIFIED OBESITY TYPE: ICD-10-CM

## 2025-03-18 DIAGNOSIS — F33.41 RECURRENT MAJOR DEPRESSIVE DISORDER, IN PARTIAL REMISSION: ICD-10-CM

## 2025-03-18 DIAGNOSIS — F33.0 MILD EPISODE OF RECURRENT MAJOR DEPRESSIVE DISORDER: ICD-10-CM

## 2025-03-18 DIAGNOSIS — F43.10 POST TRAUMATIC STRESS DISORDER (PTSD): ICD-10-CM

## 2025-03-18 DIAGNOSIS — F99 INSOMNIA DUE TO OTHER MENTAL DISORDER: ICD-10-CM

## 2025-03-18 DIAGNOSIS — J30.9 ALLERGIC RHINITIS, UNSPECIFIED SEASONALITY, UNSPECIFIED TRIGGER: ICD-10-CM

## 2025-03-18 DIAGNOSIS — E55.9 VITAMIN D DEFICIENCY: ICD-10-CM

## 2025-03-18 DIAGNOSIS — J45.20 MILD INTERMITTENT ASTHMA, UNSPECIFIED WHETHER COMPLICATED: ICD-10-CM

## 2025-03-18 DIAGNOSIS — F51.05 INSOMNIA DUE TO OTHER MENTAL DISORDER: ICD-10-CM

## 2025-03-18 DIAGNOSIS — M19.90 OSTEOARTHRITIS, UNSPECIFIED OSTEOARTHRITIS TYPE, UNSPECIFIED SITE: ICD-10-CM

## 2025-03-18 RX ORDER — TRAZODONE HYDROCHLORIDE 50 MG/1
TABLET ORAL
Qty: 60 TABLET | Refills: 1 | Status: SHIPPED | OUTPATIENT
Start: 2025-03-18

## 2025-03-18 RX ORDER — BUSPIRONE HYDROCHLORIDE 10 MG/1
10 TABLET ORAL 3 TIMES DAILY
Qty: 270 TABLET | Refills: 1 | Status: SHIPPED | OUTPATIENT
Start: 2025-03-18

## 2025-03-18 RX ORDER — NORGESTIMATE AND ETHINYL ESTRADIOL 7DAYSX3 28
1 KIT ORAL DAILY
Qty: 84 TABLET | Refills: 1 | Status: SHIPPED | OUTPATIENT
Start: 2025-03-18

## 2025-03-18 RX ORDER — MELOXICAM 15 MG/1
15 TABLET ORAL DAILY
Qty: 90 TABLET | Refills: 1 | Status: SHIPPED | OUTPATIENT
Start: 2025-03-18

## 2025-03-18 RX ORDER — LORATADINE 10 MG/1
10 TABLET ORAL DAILY
Qty: 90 TABLET | Refills: 1 | Status: SHIPPED | OUTPATIENT
Start: 2025-03-18

## 2025-03-18 RX ORDER — ERGOCALCIFEROL 1.25 MG/1
50000 CAPSULE, LIQUID FILLED ORAL
Qty: 13 CAPSULE | Refills: 1 | Status: SHIPPED | OUTPATIENT
Start: 2025-03-18

## 2025-03-18 RX ORDER — CITALOPRAM HYDROBROMIDE 40 MG/1
40 TABLET ORAL DAILY
Qty: 90 TABLET | Refills: 1 | Status: SHIPPED | OUTPATIENT
Start: 2025-03-18

## 2025-03-18 RX ORDER — MONTELUKAST SODIUM 10 MG/1
10 TABLET ORAL EVERY EVENING
Qty: 90 TABLET | Refills: 1 | Status: SHIPPED | OUTPATIENT
Start: 2025-03-18

## 2025-03-18 NOTE — ASSESSMENT & PLAN NOTE
Patient's (Body mass index is 38.98 kg/m².) indicates that they are morbidly/severely obese (BMI > 40 or > 35 with obesity - related health condition) with health conditions that include diabetes mellitus . Weight is improving with lifestyle modifications. BMI  is above average; BMI management plan is completed. We discussed portion control and increasing exercise.

## 2025-03-18 NOTE — PROGRESS NOTES
Chief Complaint  Anxiety, Depression, Diabetes, Insomnia, Allergies, and Contraception    SUBJECTIVE  Mary Go presents to [facility] for 4 month follow up on Anxiety, Depression, Diabetes, Insomnia, Allergies, and Contraception.    Gave pt number to general surgery to schedule colon consult.    History of Present Illness  Past Medical History:   Diagnosis Date    Allergic     Allergy, unspecified, initial encounter     Anemia     Ankle sprain     Arthritis     Arthritis of back 2002    Arthritis of neck 2002    Asthma     Cervical disc disorder     Colitis, ulcerative 2002    Colon polyp     Depression     Diabetes mellitus type 2 in nonobese 03/06/2018    Fracture of wrist     2nd grade    Fracture, finger 2018    Fracture, foot     Limb swelling     Low back pain     Obesity 03/06/2018    Pain in both knees 03/20/2018    UNSPECIFIED CHRONICITY    Patellar tendinitis 03/20/2018    Psoriasis 07/30/2019    Reflux esophagitis     Seasonal allergies     Sinusitis, chronic     Skin disorder     SOB (shortness of breath)     Tendonitis 03/20/2018    INSERTIONAL PATELLAR TENDONITIS, BILATERAL    Thoracic disc disorder     Wrist sprain       Family History   Problem Relation Age of Onset    Diabetes Mother         UNSPECIFIED TYPE    Arthritis Mother     Osteoporosis Mother     Anesthesia problems Mother         Pseudocholinesterase    Broken bones Mother     Kidney disease Mother     Stroke Mother     Stroke Sister     Diabetes Sister         UNSPECIFIED TYPE    Arthritis Sister     Osteoporosis Sister     Breast cancer Maternal Grandmother         MALIGNANT    Heart disease Maternal Grandmother     Cancer Maternal Grandmother         Breast    Hearing loss Maternal Grandmother     Kidney disease Maternal Grandmother     Stroke Maternal Grandfather     Heart disease Maternal Grandfather     Breast cancer Paternal Grandmother         MALIGNANT    Cancer Paternal Grandmother      Cancer Paternal Grandfather         UNSPECIFIED    COPD Other     Emphysema Other     Diabetes Other     Diabetes Sister     Liver disease Sister     Miscarriages / Stillbirths Sister       Past Surgical History:   Procedure Laterality Date    CERVICAL POLYPECTOMY  2001    COLONOSCOPY  07/13/2018    REPEAT IN 3 YEARS (ELIZABETH)    DIAGNOSTIC LAPAROSCOPY      ENDOSCOPY  07/31/2018    POLYPECTOMY  2002    STOMACH POLYP REMOVED    WISDOM TOOTH EXTRACTION  1997        Current Outpatient Medications:     albuterol sulfate  (90 Base) MCG/ACT inhaler, Inhale 2 puffs Every 4 (Four) Hours As Needed for Wheezing or Shortness of Air., Disp: 8 g, Rfl: 3    azelastine (ASTELIN) 0.1 % nasal spray, Administer 2 sprays into the nostril(s) as directed by provider 2 (Two) Times a Day., Disp: 90 mL, Rfl: 1    Blood Glucose Monitoring Suppl (D-Care Glucometer) w/Device kit, Use 1 each Daily., Disp: 90 each, Rfl: 1    busPIRone (BUSPAR) 10 MG tablet, Take 1 tablet by mouth 3 (Three) Times a Day., Disp: 270 tablet, Rfl: 1    citalopram (CeleXA) 40 MG tablet, Take 1 tablet by mouth Daily., Disp: 90 tablet, Rfl: 1    Continuous Blood Gluc Sensor (FreeStyle Terrie 14 Day Sensor) [facility] [place], 1 each Every 14 (Fourteen) Days., Disp: 2 each, Rfl: 11    cyclobenzaprine (FLEXERIL) 10 MG tablet, TAKE ONE TABLET BY MOUTH EVERY NIGHT AT BEDTIME IF NEEDED FOR MUSCLE SPASM, Disp: 30 tablet, Rfl: 0    Dulaglutide (Trulicity) 3 MG/0.5ML solution pen-injector, Inject 0.5 mL under the skin into the appropriate area as directed 1 (One) Time Per Week., Disp: 6 mL, Rfl: 2    empagliflozin (Jardiance) 10 MG tablet tablet, Take 1 tablet by mouth Daily., Disp: 90 tablet, Rfl: 1    EPINEPHrine (EPIPEN) 0.3 MG/0.3ML solution auto-injector injection, Inject 0.3 mL into the appropriate muscle as directed by prescriber 1 (One) Time., Disp: , Rfl:     fluticasone (FLONASE) 50 MCG/ACT nasal spray, 1 spray by Each Nare route Daily., Disp: 24 g, Rfl: 1    glucose blood  "(FREESTYLE LITE) test strip, USE AS DIRECTED TO CHECK BLOOD SUGAR TWO TIMES A DAY, Disp: 100 each, Rfl: 5    ketoconazole (NIZORAL) 2 % shampoo, Apply  topically to the appropriate area as directed 2 (Two) Times a Week., Disp: 120 mL, Rfl: 2    Lancets (freestyle) lancets, USE TO TEST BLOOD SUGAR TWO TIMES A DAY, Disp: 100 each, Rfl: 5    loratadine (CLARITIN) 10 MG tablet, Take 1 tablet by mouth Daily., Disp: 90 tablet, Rfl: 1    meloxicam (MOBIC) 15 MG tablet, Take 1 tablet by mouth Daily., Disp: 90 tablet, Rfl: 1    montelukast (SINGULAIR) 10 MG tablet, Take 1 tablet by mouth Every Evening., Disp: 90 tablet, Rfl: 1    norgestimate-ethinyl estradiol (Tri-Estarylla) 0.18/0.215/0.25 MG-35 MCG per tablet, Take 1 tablet by mouth Daily., Disp: 84 tablet, Rfl: 1    Omeprazole (PRILOSEC PO), Take 1 tablet by mouth Daily As Needed., Disp: , Rfl:     pregabalin (LYRICA) 75 MG capsule, TAKE 1 CAPSULE TWICE A DAY BY ORAL ROUTE AS DIRECTED FOR 30 DAYS., Disp: , Rfl:     traZODone (DESYREL) 50 MG tablet, Take 0.5 to 2 tab PO QHS PRN sleep, Disp: 60 tablet, Rfl: 1    vitamin D (ERGOCALCIFEROL) 1.25 MG (25205 UT) capsule capsule, Take 1 capsule by mouth Every 7 (Seven) Days., Disp: 13 capsule, Rfl: 1    benzonatate (TESSALON) 200 MG capsule, Take 1 capsule by mouth 3 (Three) Times a Day As Needed for Cough. (Patient not taking: Reported on 3/18/2025), Disp: 21 capsule, Rfl: 0    ipratropium-albuterol (DUO-NEB) 0.5-2.5 mg/3 ml nebulizer, Take 3 mL by nebulization Every 4 (Four) Hours As Needed for Wheezing or Shortness of Air. (Patient not taking: Reported on 3/18/2025), Disp: 360 mL, Rfl: 0    sodium chloride (Ocean Nasal Spray) 0.65 % nasal spray, 1 spray into the nostril(s) as directed by provider As Needed for Congestion., Disp: 30 mL, Rfl: 1    OBJECTIVE  Vital Signs:   Ht 160 cm (62.99\")   Wt 99.8 kg (220 lb)   BMI 38.98 kg/m²    Estimated body mass index is 38.98 kg/m² as calculated from the following:    Height as of " "this encounter: 160 cm (62.99\").    Weight as of this encounter: 99.8 kg (220 lb).     Wt Readings from Last 3 Encounters:   03/18/25 99.8 kg (220 lb)   02/02/25 103 kg (226 lb)   11/18/24 103 kg (226 lb)     BP Readings from Last 3 Encounters:   02/02/25 127/80   11/18/24 122/62   09/05/24 101/47       Physical Exam  Vitals reviewed.   Constitutional:       Appearance: Normal appearance. She is well-developed.   HENT:      Head: Normocephalic and atraumatic.      Right Ear: External ear normal.      Left Ear: External ear normal.   Eyes:      Conjunctiva/sclera: Conjunctivae normal.      Pupils: Pupils are equal, round, and reactive to light.   Cardiovascular:      Rate and Rhythm: Normal rate and regular rhythm.      Heart sounds: No murmur heard.     No friction rub. No gallop.   Pulmonary:      Effort: Pulmonary effort is normal.      Breath sounds: Normal breath sounds. No wheezing or rhonchi.   Skin:     General: Skin is warm and dry.   Neurological:      Mental Status: She is alert and oriented to person, place, and time.      Cranial Nerves: No cranial nerve deficit.   Psychiatric:         Mood and Affect: Mood and affect normal.         Behavior: Behavior normal.         Thought Content: Thought content normal.         Judgment: Judgment normal.          Result Review    CMP          5/14/2024    11:38 11/18/2024    12:04 12/31/2024    16:06   CMP   Glucose 108  109  226    BUN 8  7  11    Creatinine 0.63  1.07  0.94    EGFR 112.3  65.4  76.4    Sodium 138  141  138    Potassium 4.3  4.0  3.8    Chloride 107  107  105    Calcium 8.9  9.7  9.2    Total Protein 6.6  7.5     Albumin 4.1  4.2     Globulin 2.5  3.3     Total Bilirubin 0.4  0.4     Alkaline Phosphatase 84  105     AST (SGOT) 19  13     ALT (SGPT) 16  13     Albumin/Globulin Ratio 1.6  1.3     BUN/Creatinine Ratio 12.7  6.5  11.7    Anion Gap 10.5  11.3  10.0      CBC          11/18/2024    12:04 12/31/2024    16:06   CBC   WBC 15.17  13.35    RBC " 5.02  4.70    Hemoglobin 16.1  15.3    Hematocrit 46.7  44.5    MCV 93.0  94.7    MCH 32.1  32.6    MCHC 34.5  34.4    RDW 12.1  11.7    Platelets 376  344      Lipid Panel          5/14/2024    11:38 11/18/2024    12:04   Lipid Panel   Total Cholesterol 157  175    Triglycerides 147  86    HDL Cholesterol 39  45    VLDL Cholesterol 26  16    LDL Cholesterol  92  114    LDL/HDL Ratio 2.27  2.51      TSH          11/18/2024    12:04   TSH   TSH 1.090          XR Chest 2 View  Result Date: 2/2/2025  Impression: An acute pulmonary process is not apparent. Electronically Signed: Radames Esquivel MD  2/2/2025 11:26 AM EST  Workstation ID: [id number]    Mammo Screening Digital Tomosynthesis Bilateral With CAD  Result Date: 12/3/2024  No mammographic evidence of malignancy.  Recommend annual screening mammography.  BI-RADS ASSESSMENT: Category 1: Negative  Note:  It has been reported that there is approximately a 15% false negative rate in mammography.  Therefore, management of a palpable abnormality should not be deferred because of a negative mammogram.  Electronically Signed By-Saran Green MD On:12/3/2024 9:14 AM         The above data has been reviewed by NIKOLE Odom 03/18/2025 10:24 EDT.          Patient Care Team:  Nicolle Mcmahon APRN as PCP - General (Nurse Practitioner)      ASSESSMENT & PLAN    Diagnoses and all orders for this visit:    1. Type 2 diabetes mellitus with hyperglycemia, without long-term current use of insulin (Primary)  Overview:  Stable and well-controlled on last check, continue current medications, recheck A1c today    Orders:  -     empagliflozin (Jardiance) 10 MG tablet tablet; Take 1 tablet by mouth Daily.  Dispense: 90 tablet; Refill: 1  -     Comprehensive Metabolic Panel; Future  -     Cancel: Lipid Panel; Future  -     Hemoglobin A1c; Future  -     Microalbumin / Creatinine Urine Ratio - Urine, Clean Catch; Future    2. Generalized anxiety disorder  Overview:  Stable and  well-controlled at present, continue current medication    Orders:  -     busPIRone (BUSPAR) 10 MG tablet; Take 1 tablet by mouth 3 (Three) Times a Day.  Dispense: 270 tablet; Refill: 1  -     citalopram (CeleXA) 40 MG tablet; Take 1 tablet by mouth Daily.  Dispense: 90 tablet; Refill: 1    3. Mild episode of recurrent major depressive disorder  Overview:  Stable and well-controlled at present, continue current medication    Orders:  -     busPIRone (BUSPAR) 10 MG tablet; Take 1 tablet by mouth 3 (Three) Times a Day.  Dispense: 270 tablet; Refill: 1    4. Post traumatic stress disorder (PTSD)  -     busPIRone (BUSPAR) 10 MG tablet; Take 1 tablet by mouth 3 (Three) Times a Day.  Dispense: 270 tablet; Refill: 1    5. Recurrent major depressive disorder, in partial remission  Overview:  Stable and well-controlled at present, continue current medication    Orders:  -     citalopram (CeleXA) 40 MG tablet; Take 1 tablet by mouth Daily.  Dispense: 90 tablet; Refill: 1    6. Allergic rhinitis, unspecified seasonality, unspecified trigger  Overview:  Stable on claritin, cont current medications     Orders:  -     loratadine (CLARITIN) 10 MG tablet; Take 1 tablet by mouth Daily.  Dispense: 90 tablet; Refill: 1    7. Osteoarthritis, unspecified osteoarthritis type, unspecified site  Overview:  Stable with as needed use of meloxicam, continue current medication    Orders:  -     meloxicam (MOBIC) 15 MG tablet; Take 1 tablet by mouth Daily.  Dispense: 90 tablet; Refill: 1    8. Mild intermittent asthma, unspecified whether complicated  Overview:  Stable and well controlled, cont current medication     Orders:  -     montelukast (SINGULAIR) 10 MG tablet; Take 1 tablet by mouth Every Evening.  Dispense: 90 tablet; Refill: 1    9. Family planning  -     norgestimate-ethinyl estradiol (Tri-Estarylla) 0.18/0.215/0.25 MG-35 MCG per tablet; Take 1 tablet by mouth Daily.  Dispense: 84 tablet; Refill: 1    10. Insomnia due to other  mental disorder  Assessment & Plan:  Stable on trazodone, cont current medication     Orders:  -     traZODone (DESYREL) 50 MG tablet; Take 0.5 to 2 tab PO QHS PRN sleep  Dispense: 60 tablet; Refill: 1    11. Vitamin D deficiency  Overview:  Well controlled with Vit D supplementation , cont current dose     Orders:  -     vitamin D (ERGOCALCIFEROL) 1.25 MG (09923 UT) capsule capsule; Take 1 capsule by mouth Every 7 (Seven) Days.  Dispense: 13 capsule; Refill: 1    12. Class 2 severe obesity with serious comorbidity and body mass index (BMI) of 38.0 to 38.9 in adult, unspecified obesity type  Overview:  Cont to work on diet and exercise     Assessment & Plan:  Patient's (Body mass index is 38.98 kg/m².) indicates that they are morbidly/severely obese (BMI > 40 or > 35 with obesity - related health condition) with health conditions that include diabetes mellitus . Weight is improving with lifestyle modifications. BMI  is above average; BMI management plan is completed. We discussed portion control and increasing exercise.            Tobacco Use: High Risk (3/18/2025)    Patient History     Smoking Tobacco Use: Some Days     Smokeless Tobacco Use: Never     Passive Exposure: Not on file       Follow Up     Return in about 6 months (around 9/18/2025), or if symptoms worsen or fail to improve.        Patient was given instructions and counseling regarding her condition or for health maintenance advice. Please see specific information pulled into the AVS if appropriate.   I have reviewed information obtained and documented by others and I have confirmed the accuracy of this documented note.    NIKOLE Odom

## 2025-03-31 ENCOUNTER — TELEPHONE (OUTPATIENT)
Dept: FAMILY MEDICINE CLINIC | Facility: CLINIC | Age: 45
End: 2025-03-31

## 2025-03-31 NOTE — TELEPHONE ENCOUNTER
Caller: Mary Go    Relationship to patient: Self    Best call back number: [phone]     Patient is needing: PATIENT NEEDS HER A1C LABS SENT, MEDICATION LIST AND LAST OFFICE VISIT NOTES BEFORE [facility] CAN DO HER TEETH.    PATIENT STATES SHE NEEDS THIS INFORMATION SENT TO -[phone]    PATIENT STATES [facility] HAS MAILED OVER PAPERWORK THEY NEED FAXED.

## 2025-04-01 ENCOUNTER — TELEPHONE (OUTPATIENT)
Dept: FAMILY MEDICINE CLINIC | Facility: CLINIC | Age: 45
End: 2025-04-01
Payer: COMMERCIAL

## 2025-04-01 DIAGNOSIS — F99 INSOMNIA DUE TO OTHER MENTAL DISORDER: ICD-10-CM

## 2025-04-01 DIAGNOSIS — F51.05 INSOMNIA DUE TO OTHER MENTAL DISORDER: ICD-10-CM

## 2025-04-01 RX ORDER — TRAZODONE HYDROCHLORIDE 50 MG/1
TABLET ORAL
Qty: 180 TABLET | Refills: 0 | Status: SHIPPED | OUTPATIENT
Start: 2025-04-01

## 2025-06-02 ENCOUNTER — RESULTS FOLLOW-UP (OUTPATIENT)
Dept: FAMILY MEDICINE CLINIC | Facility: CLINIC | Age: 45
End: 2025-06-02

## 2025-06-02 ENCOUNTER — HOSPITAL ENCOUNTER (OUTPATIENT)
Dept: GENERAL RADIOLOGY | Facility: HOSPITAL | Age: 45
Discharge: HOME | End: 2025-06-02
Admitting: NURSE PRACTITIONER
Payer: COMMERCIAL

## 2025-06-02 ENCOUNTER — OFFICE VISIT (OUTPATIENT)
Dept: FAMILY MEDICINE CLINIC | Facility: CLINIC | Age: 45
End: 2025-06-02
Payer: COMMERCIAL

## 2025-06-02 VITALS
HEIGHT: 63 IN | WEIGHT: 204 LBS | DIASTOLIC BLOOD PRESSURE: 78 MMHG | SYSTOLIC BLOOD PRESSURE: 121 MMHG | BODY MASS INDEX: 36.14 KG/M2 | OXYGEN SATURATION: 97 % | HEART RATE: 92 BPM

## 2025-06-02 DIAGNOSIS — R05.9 COUGH, UNSPECIFIED TYPE: Primary | ICD-10-CM

## 2025-06-02 DIAGNOSIS — J32.9 SINUSITIS, UNSPECIFIED CHRONICITY, UNSPECIFIED LOCATION: ICD-10-CM

## 2025-06-02 DIAGNOSIS — R05.9 COUGH, UNSPECIFIED TYPE: ICD-10-CM

## 2025-06-02 LAB
EXPIRATION DATE: NORMAL
FLUAV AG UPPER RESP QL IA.RAPID: NOT DETECTED
FLUBV AG UPPER RESP QL IA.RAPID: NOT DETECTED
INTERNAL CONTROL: NORMAL
Lab: NORMAL
SARS-COV-2 AG UPPER RESP QL IA.RAPID: NOT DETECTED

## 2025-06-02 PROCEDURE — 87428 SARSCOV & INF VIR A&B AG IA: CPT | Performed by: NURSE PRACTITIONER

## 2025-06-02 PROCEDURE — 71046 X-RAY EXAM CHEST 2 VIEWS: CPT

## 2025-06-02 PROCEDURE — 99213 OFFICE O/P EST LOW 20 MIN: CPT | Performed by: NURSE PRACTITIONER

## 2025-06-02 RX ORDER — CEFDINIR 300 MG/1
300 CAPSULE ORAL 2 TIMES DAILY
Qty: 20 CAPSULE | Refills: 0 | Status: SHIPPED | OUTPATIENT
Start: 2025-06-02 | End: 2025-06-12

## 2025-06-02 RX ORDER — IPRATROPIUM BROMIDE AND ALBUTEROL SULFATE 2.5; .5 MG/3ML; MG/3ML
3 SOLUTION RESPIRATORY (INHALATION) EVERY 4 HOURS PRN
Qty: 360 ML | Refills: 0 | Status: SHIPPED | OUTPATIENT
Start: 2025-06-02

## 2025-06-02 RX ORDER — DEXTROMETHORPHAN HYDROBROMIDE AND PROMETHAZINE HYDROCHLORIDE 15; 6.25 MG/5ML; MG/5ML
5 SYRUP ORAL 4 TIMES DAILY PRN
Qty: 180 ML | Refills: 0 | Status: SHIPPED | OUTPATIENT
Start: 2025-06-02

## 2025-06-02 NOTE — PROGRESS NOTES
Chief Complaint  Cough, Nasal Congestion, and Sinusitis    SUBJECTIVE  Mary Go presents to [facility] due to cough, congestion, and sinus pressure for a little over a week. Pt has been taking Day/NyQuill and otc sinus relief.     Patient notes that the cough has been getting worse, very congested, having a lot of sinus pressure and pain    Patient notes that she is prone to pneumonia, had it several times as a child    Patient has not had a fever that she is aware of    History of Present Illness  Past Medical History:   Diagnosis Date    Allergic     Allergy, unspecified, initial encounter     Anemia     Ankle sprain     Arthritis     Arthritis of back 2002    Arthritis of neck 2002    Asthma     Cervical disc disorder     Colitis, ulcerative 2002    Colon polyp     Depression     Diabetes mellitus type 2 in nonobese 03/06/2018    Fracture of wrist     2nd grade    Fracture, finger 2018    Fracture, foot     Limb swelling     Low back pain     Obesity 03/06/2018    Pain in both knees 03/20/2018    UNSPECIFIED CHRONICITY    Patellar tendinitis 03/20/2018    Psoriasis 07/30/2019    Reflux esophagitis     Seasonal allergies     Sinusitis, chronic     Skin disorder     SOB (shortness of breath)     Tendonitis 03/20/2018    INSERTIONAL PATELLAR TENDONITIS, BILATERAL    Thoracic disc disorder     Wrist sprain       Family History   Problem Relation Age of Onset    Diabetes Mother         UNSPECIFIED TYPE    Arthritis Mother     Osteoporosis Mother     Anesthesia problems Mother         Pseudocholinesterase    Broken bones Mother     Kidney disease Mother     Stroke Mother     Stroke Sister     Diabetes Sister         UNSPECIFIED TYPE    Arthritis Sister     Osteoporosis Sister     Breast cancer Maternal Grandmother         MALIGNANT    Heart disease Maternal Grandmother     Cancer Maternal Grandmother         Breast    Hearing loss Maternal Grandmother     Kidney disease Maternal  Grandmother     Stroke Maternal Grandfather     Heart disease Maternal Grandfather     Breast cancer Paternal Grandmother         MALIGNANT    Cancer Paternal Grandmother     Cancer Paternal Grandfather         UNSPECIFIED    COPD Other     Emphysema Other     Diabetes Other     Diabetes Sister     Liver disease Sister     Miscarriages / Stillbirths Sister       Past Surgical History:   Procedure Laterality Date    CERVICAL POLYPECTOMY  2001    COLONOSCOPY  07/13/2018    REPEAT IN 3 YEARS (ELIZABETH)    DIAGNOSTIC LAPAROSCOPY      ENDOSCOPY  07/31/2018    POLYPECTOMY  2002    STOMACH POLYP REMOVED    WISDOM TOOTH EXTRACTION  1997        Current Outpatient Medications:     albuterol sulfate  (90 Base) MCG/ACT inhaler, Inhale 2 puffs Every 4 (Four) Hours As Needed for Wheezing or Shortness of Air., Disp: 8 g, Rfl: 3    azelastine (ASTELIN) 0.1 % nasal spray, Administer 2 sprays into the nostril(s) as directed by provider 2 (Two) Times a Day., Disp: 90 mL, Rfl: 1    Blood Glucose Monitoring Suppl (D-Care Glucometer) w/Device kit, Use 1 each Daily., Disp: 90 each, Rfl: 1    busPIRone (BUSPAR) 10 MG tablet, Take 1 tablet by mouth 3 (Three) Times a Day., Disp: 270 tablet, Rfl: 1    citalopram (CeleXA) 40 MG tablet, Take 1 tablet by mouth Daily., Disp: 90 tablet, Rfl: 1    Continuous Blood Gluc Sensor (FreeStyle [place] 14 Day Sensor[place] [facility] [place] 1 each Every 14 (Fourteen) Days., Disp: 2 each, Rfl: 11    cyclobenzaprine (FLEXERIL) 10 MG tablet, TAKE ONE TABLET BY MOUTH EVERY NIGHT AT BEDTIME IF NEEDED FOR MUSCLE SPASM, Disp: 30 tablet, Rfl: 0    Dulaglutide (Trulicity) 3 MG/0.5ML solution pen-injector, Inject 0.5 mL under the skin into the appropriate area as directed 1 (One) Time Per Week., Disp: 6 mL, Rfl: 2    empagliflozin (Jardiance) 10 MG tablet tablet, Take 1 tablet by mouth Daily., Disp: 90 tablet, Rfl: 1    EPINEPHrine (EPIPEN) 0.3 MG/0.3ML solution auto-injector injection, Inject 0.3 mL into the appropriate muscle as  "directed by prescriber 1 (One) Time., Disp: , Rfl:     fluticasone (FLONASE) 50 MCG/ACT nasal spray, 1 spray by Each Nare route Daily., Disp: 24 g, Rfl: 1    glucose blood (FREESTYLE LITE) test strip, USE AS DIRECTED TO CHECK BLOOD SUGAR TWO TIMES A DAY, Disp: 100 each, Rfl: 5    ipratropium-albuterol (DUO-NEB) 0.5-2.5 mg/3 ml nebulizer, Take 3 mL by nebulization Every 4 (Four) Hours As Needed for Wheezing or Shortness of Air., Disp: 360 mL, Rfl: 0    ketoconazole (NIZORAL) 2 % shampoo, Apply  topically to the appropriate area as directed 2 (Two) Times a Week., Disp: 120 mL, Rfl: 2    Lancets (freestyle) lancets, USE TO TEST BLOOD SUGAR TWO TIMES A DAY, Disp: 100 each, Rfl: 5    loratadine (CLARITIN) 10 MG tablet, Take 1 tablet by mouth Daily., Disp: 90 tablet, Rfl: 1    meloxicam (MOBIC) 15 MG tablet, Take 1 tablet by mouth Daily., Disp: 90 tablet, Rfl: 1    montelukast (SINGULAIR) 10 MG tablet, Take 1 tablet by mouth Every Evening., Disp: 90 tablet, Rfl: 1    norgestimate-ethinyl estradiol (Tri-Estarylla) 0.18/0.215/0.25 MG-35 MCG per tablet, Take 1 tablet by mouth Daily., Disp: 84 tablet, Rfl: 1    Omeprazole (PRILOSEC PO), Take 1 tablet by mouth Daily As Needed., Disp: , Rfl:     pregabalin (LYRICA) 75 MG capsule, TAKE 1 CAPSULE TWICE A DAY BY ORAL ROUTE AS DIRECTED FOR 30 DAYS., Disp: , Rfl:     traZODone (DESYREL) 50 MG tablet, Take 0.5 to 2 tab PO QHS PRN sleep, Disp: 180 tablet, Rfl: 0    vitamin D (ERGOCALCIFEROL) 1.25 MG (20485 UT) capsule capsule, Take 1 capsule by mouth Every 7 (Seven) Days., Disp: 13 capsule, Rfl: 1    cefdinir (OMNICEF) 300 MG capsule, Take 1 capsule by mouth 2 (Two) Times a Day for 10 days., Disp: 20 capsule, Rfl: 0    promethazine-dextromethorphan (PROMETHAZINE-DM) 6.25-15 MG/5ML syrup, Take 5 mL by mouth 4 (Four) Times a Day As Needed for Cough., Disp: 180 mL, Rfl: 0    OBJECTIVE  Vital Signs:   /78   Pulse 92   Ht 160 cm (62.99\")   Wt 92.5 kg (204 lb)   SpO2 97%   BMI " "36.15 kg/m²    Estimated body mass index is 36.15 kg/m² as calculated from the following:    Height as of this encounter: 160 cm (62.99\").    Weight as of this encounter: 92.5 kg (204 lb).     Wt Readings from Last 3 Encounters:   06/02/25 92.5 kg (204 lb)   03/18/25 99.8 kg (220 lb)   02/02/25 103 kg (226 lb)     BP Readings from Last 3 Encounters:   06/02/25 121/78   02/02/25 127/80   11/18/24 122/62       Physical Exam  Vitals reviewed.   Constitutional:       Appearance: Normal appearance. She is well-developed.   HENT:      Head: Normocephalic and atraumatic.      Right Ear: Tympanic membrane, ear canal and external ear normal.      Left Ear: Tympanic membrane, ear canal and external ear normal.      Nose: Congestion and rhinorrhea present.      Comments: Sinuses tender to palpation     Mouth/Throat:      Pharynx: No oropharyngeal exudate or posterior oropharyngeal erythema.   Eyes:      Conjunctiva/sclera: Conjunctivae normal.      Pupils: Pupils are equal, round, and reactive to light.   Cardiovascular:      Rate and Rhythm: Normal rate and regular rhythm.      Heart sounds: No murmur heard.     No friction rub. No gallop.   Pulmonary:      Effort: Pulmonary effort is normal.      Breath sounds: Wheezing and rhonchi present.   Skin:     General: Skin is warm and dry.   Neurological:      Mental Status: She is alert and oriented to person, place, and time.      Cranial Nerves: No cranial nerve deficit.   Psychiatric:         Mood and Affect: Mood and affect normal.         Behavior: Behavior normal.         Thought Content: Thought content normal.         Judgment: Judgment normal.          Result Review    CMP          11/18/2024    12:04 12/31/2024    16:06   CMP   Glucose 109  226    BUN 7  11    Creatinine 1.07  0.94    EGFR 65.4  76.4    Sodium 141  138    Potassium 4.0  3.8    Chloride 107  105    Calcium 9.7  9.2    Total Protein 7.5     Albumin 4.2     Globulin 3.3     Total Bilirubin 0.4     Alkaline " Phosphatase 105     AST (SGOT) 13     ALT (SGPT) 13     Albumin/Globulin Ratio 1.3     BUN/Creatinine Ratio 6.5  11.7    Anion Gap 11.3  10.0      CBC          11/18/2024    12:04 12/31/2024    16:06   CBC   WBC 15.17  13.35    RBC 5.02  4.70    Hemoglobin 16.1  15.3    Hematocrit 46.7  44.5    MCV 93.0  94.7    MCH 32.1  32.6    MCHC 34.5  34.4    RDW 12.1  11.7    Platelets 376  344      Lipid Panel          11/18/2024    12:04   Lipid Panel   Total Cholesterol 175    Triglycerides 86    HDL Cholesterol 45    VLDL Cholesterol 16    LDL Cholesterol  114    LDL/HDL Ratio 2.51      TSH          11/18/2024    12:04   TSH   TSH 1.090      Most Recent A1C          11/18/2024    12:04   HGBA1C Most Recent   Hemoglobin A1C 6.80        No Images in the past 120 days found..     The above data has been reviewed by NIKOLE Odom 06/02/2025 13:38 EDT.          Patient Care Team:  Nicolle Mcmahon APRN as PCP - General (Nurse Practitioner)            ASSESSMENT & PLAN    Diagnoses and all orders for this visit:    1. Cough, unspecified type (Primary)  -     POCT SARS-CoV-2 Antigen CONCHITA + Flu  -     XR Chest 2 View; Future  -     ipratropium-albuterol (DUO-NEB) 0.5-2.5 mg/3 ml nebulizer; Take 3 mL by nebulization Every 4 (Four) Hours As Needed for Wheezing or Shortness of Air.  Dispense: 360 mL; Refill: 0  -     promethazine-dextromethorphan (PROMETHAZINE-DM) 6.25-15 MG/5ML syrup; Take 5 mL by mouth 4 (Four) Times a Day As Needed for Cough.  Dispense: 180 mL; Refill: 0  -     cefdinir (OMNICEF) 300 MG capsule; Take 1 capsule by mouth 2 (Two) Times a Day for 10 days.  Dispense: 20 capsule; Refill: 0    2. Sinusitis, unspecified chronicity, unspecified location  -     cefdinir (OMNICEF) 300 MG capsule; Take 1 capsule by mouth 2 (Two) Times a Day for 10 days.  Dispense: 20 capsule; Refill: 0       Patient with unknown allergy to penicillin listed.  States her mother told her when she was a kid she was allergic.  She is  uncertain of the reaction, possible rash?  Patient notes that she has tasted her daughters amoxicillin before and never had any problems, patient also notes that she herself can tolerate cephalosporins without any issues-knows she has taken Keflex several times      Tobacco Use: High Risk (6/2/2025)    Patient History     Smoking Tobacco Use: Some Days     Smokeless Tobacco Use: Never     Passive Exposure: Not on file       Follow Up     Return if symptoms worsen or fail to improve.        Patient was given instructions and counseling regarding her condition or for health maintenance advice. Please see specific information pulled into the AVS if appropriate.   I have reviewed information obtained and documented by others and I have confirmed the accuracy of this documented note.    Nicolle Mcmahon, NIKOLE

## 2025-06-12 ENCOUNTER — OFFICE VISIT (OUTPATIENT)
Dept: FAMILY MEDICINE CLINIC | Facility: CLINIC | Age: 45
End: 2025-06-12
Payer: COMMERCIAL

## 2025-06-12 VITALS
HEIGHT: 63 IN | WEIGHT: 203 LBS | DIASTOLIC BLOOD PRESSURE: 59 MMHG | BODY MASS INDEX: 35.97 KG/M2 | SYSTOLIC BLOOD PRESSURE: 117 MMHG | OXYGEN SATURATION: 98 % | TEMPERATURE: 99.1 F | HEART RATE: 86 BPM

## 2025-06-12 DIAGNOSIS — L03.311 CELLULITIS OF ABDOMINAL WALL: Primary | ICD-10-CM

## 2025-06-12 DIAGNOSIS — T63.301A SPIDER BITE WOUND, ACCIDENTAL OR UNINTENTIONAL, INITIAL ENCOUNTER: ICD-10-CM

## 2025-06-12 RX ORDER — FLUCONAZOLE 150 MG/1
150 TABLET ORAL
Qty: 2 TABLET | Refills: 0 | Status: SHIPPED | OUTPATIENT
Start: 2025-06-12 | End: 2025-06-16

## 2025-06-12 RX ORDER — DOXYCYCLINE 100 MG/1
100 CAPSULE ORAL 2 TIMES DAILY
Qty: 20 CAPSULE | Refills: 0 | Status: SHIPPED | OUTPATIENT
Start: 2025-06-12

## 2025-06-12 NOTE — PROGRESS NOTES
Chief Complaint   Patient presents with    Insect Bite     Spider bite on stomach   Happen Saturday         Subjective     Mary Go  has a past medical history of Allergic, Allergy, unspecified, initial encounter, Anemia, Ankle sprain, Arthritis, Arthritis of back (2002), Arthritis of neck (2002), Asthma, Cervical disc disorder, Colitis, ulcerative (2002), Colon polyp, Depression, Diabetes mellitus type 2 in nonobese (03/06/2018), Fracture of wrist, Fracture, finger (2018), Fracture, foot, Limb swelling, Low back pain, Obesity (03/06/2018), Pain in both knees (03/20/2018), Patellar tendinitis (03/20/2018), Psoriasis (07/30/2019), Reflux esophagitis, Seasonal allergies, Sinusitis, chronic, Skin disorder, SOB (shortness of breath), Tendonitis (03/20/2018), Thoracic disc disorder, and Wrist sprain.    HPI    History of Present Illness  The patient is a 44-year-old female who presents today for a spider bite on the abdomen that occurred on 06/07/2025.    She reports being bitten by a spider over the weekend, which was not present in the morning. The incident occurred while she was at work, approximately six hours into her shift. She noticed two small marks on her abdomen while washing her hands in the bathroom, accompanied by mild redness and subsequent swelling. The area is extremely itchy, but no foreign material extracted from the site. She reports no systemic symptoms such as fever but mentions feeling unwell Tuesday.    She has allergies to sulfa and possibly penicillin. She recently completed a course of Omnicef for a sinus infection, which has left her with a residual cough. She was initially suspected to have pneumonia due to the sound of her cough, but this was not confirmed. She is prone to yeast infections and is diabetic. We discussed antibiotic therapy including doxycycline or clindamycin due to cellulitis of her abdominal wall and with her allergies.  Discussed that clindamycin does carry the highest  risk of C. difficile infection.  After discussion with patient we decided ultimately on doxycycline for treatment.        Allergies   Allergen Reactions    Metformin Nausea And Vomiting and GI Intolerance     Went to the ER after taking    Penicillins Unknown - High Severity     unknown    Sulfa Antibiotics Rash and Other (See Comments)     .    Latex Rash         Current Outpatient Medications:     albuterol sulfate  (90 Base) MCG/ACT inhaler, Inhale 2 puffs Every 4 (Four) Hours As Needed for Wheezing or Shortness of Air., Disp: 8 g, Rfl: 3    azelastine (ASTELIN) 0.1 % nasal spray, Administer 2 sprays into the nostril(s) as directed by provider 2 (Two) Times a Day., Disp: 90 mL, Rfl: 1    Blood Glucose Monitoring Suppl (D-Care Glucometer) w/Device kit, Use 1 each Daily., Disp: 90 each, Rfl: 1    busPIRone (BUSPAR) 10 MG tablet, Take 1 tablet by mouth 3 (Three) Times a Day., Disp: 270 tablet, Rfl: 1    citalopram (CeleXA) 40 MG tablet, Take 1 tablet by mouth Daily., Disp: 90 tablet, Rfl: 1    Continuous Blood Gluc Sensor (FreeStyle Terrie 14 Day Sensor[place] [facility] [place] 1 each Every 14 (Fourteen) Days., Disp: 2 each, Rfl: 11    cyclobenzaprine (FLEXERIL) 10 MG tablet, TAKE ONE TABLET BY MOUTH EVERY NIGHT AT BEDTIME IF NEEDED FOR MUSCLE SPASM, Disp: 30 tablet, Rfl: 0    Dulaglutide (Trulicity) 3 MG/0.5ML solution pen-injector, Inject 0.5 mL under the skin into the appropriate area as directed 1 (One) Time Per Week., Disp: 6 mL, Rfl: 2    empagliflozin (Jardiance) 10 MG tablet tablet, Take 1 tablet by mouth Daily., Disp: 90 tablet, Rfl: 1    EPINEPHrine (EPIPEN) 0.3 MG/0.3ML solution auto-injector injection, Inject 0.3 mL into the appropriate muscle as directed by prescriber 1 (One) Time., Disp: , Rfl:     fluticasone (FLONASE) 50 MCG/ACT nasal spray, 1 spray by Each Nare route Daily., Disp: 24 g, Rfl: 1    glucose blood (FREESTYLE LITE) test strip, USE AS DIRECTED TO CHECK BLOOD SUGAR TWO TIMES A DAY, Disp: 100 each,  Rfl: 5    ipratropium-albuterol (DUO-NEB) 0.5-2.5 mg/3 ml nebulizer, Take 3 mL by nebulization Every 4 (Four) Hours As Needed for Wheezing or Shortness of Air., Disp: 360 mL, Rfl: 0    ketoconazole (NIZORAL) 2 % shampoo, Apply  topically to the appropriate area as directed 2 (Two) Times a Week., Disp: 120 mL, Rfl: 2    Lancets (freestyle) lancets, USE TO TEST BLOOD SUGAR TWO TIMES A DAY, Disp: 100 each, Rfl: 5    loratadine (CLARITIN) 10 MG tablet, Take 1 tablet by mouth Daily., Disp: 90 tablet, Rfl: 1    meloxicam (MOBIC) 15 MG tablet, Take 1 tablet by mouth Daily., Disp: 90 tablet, Rfl: 1    montelukast (SINGULAIR) 10 MG tablet, Take 1 tablet by mouth Every Evening., Disp: 90 tablet, Rfl: 1    norgestimate-ethinyl estradiol (Tri-Estarylla) 0.18/0.215/0.25 MG-35 MCG per tablet, Take 1 tablet by mouth Daily., Disp: 84 tablet, Rfl: 1    Omeprazole (PRILOSEC PO), Take 1 tablet by mouth Daily As Needed., Disp: , Rfl:     pregabalin (LYRICA) 75 MG capsule, TAKE 1 CAPSULE TWICE A DAY BY ORAL ROUTE AS DIRECTED FOR 30 DAYS., Disp: , Rfl:     promethazine-dextromethorphan (PROMETHAZINE-DM) 6.25-15 MG/5ML syrup, Take 5 mL by mouth 4 (Four) Times a Day As Needed for Cough., Disp: 180 mL, Rfl: 0    traZODone (DESYREL) 50 MG tablet, Take 0.5 to 2 tab PO QHS PRN sleep, Disp: 180 tablet, Rfl: 0    vitamin D (ERGOCALCIFEROL) 1.25 MG (14692 UT) capsule capsule, Take 1 capsule by mouth Every 7 (Seven) Days., Disp: 13 capsule, Rfl: 1    doxycycline (VIBRAMYCIN) 100 MG capsule, Take 1 capsule by mouth 2 (Two) Times a Day., Disp: 20 capsule, Rfl: 0    fluconazole (Diflucan) 150 MG tablet, Take 1 tablet by mouth Every 72 (Seventy-Two) Hours for 2 doses., Disp: 2 tablet, Rfl: 0    Patient Active Problem List   Diagnosis    Allergy    Anemia    Osteoarthritis    Asthma    Class 2 severe obesity with serious comorbidity and body mass index (BMI) of 38.0 to 38.9 in adult    Pain in both knees    Sinusitis, chronic    Shortness of breath     Type 2 diabetes mellitus    Esophageal reflux    Colitis, ulcerative    Patellar tendinitis    Psoriasis    Vitamin D deficiency    Allergic rhinitis    Family history of pseudocholinesterase deficiency    Anxiety and depression    Nausea    Generalized anxiety disorder    Recurrent major depressive disorder, in partial remission    Insomnia due to other mental disorder    Mild episode of recurrent major depressive disorder        Past Surgical History:   Procedure Laterality Date    CERVICAL POLYPECTOMY  2001    COLONOSCOPY  07/13/2018    REPEAT IN 3 YEARS (ELIZABETH)    DIAGNOSTIC LAPAROSCOPY      ENDOSCOPY  07/31/2018    POLYPECTOMY  2002    STOMACH POLYP REMOVED    WISDOM TOOTH EXTRACTION  1997       Social History     Socioeconomic History    Marital status: Single   Tobacco Use    Smoking status: Some Days     Current packs/day: 0.50     Average packs/day: 0.5 packs/day for 10.0 years (5.0 ttl pk-yrs)     Types: Cigarettes    Smokeless tobacco: Never    Tobacco comments:     Quit smoking over 9 years ago   Vaping Use    Vaping status: Never Used   Substance and Sexual Activity    Alcohol use: Yes     Comment: occasional    Drug use: Never    Sexual activity: Not Currently     Partners: Male     Birth control/protection: Condom, Pill       Family History   Problem Relation Age of Onset    Diabetes Mother         UNSPECIFIED TYPE    Arthritis Mother     Osteoporosis Mother     Anesthesia problems Mother         Pseudocholinesterase    Broken bones Mother     Kidney disease Mother     Stroke Mother     Stroke Sister     Diabetes Sister         UNSPECIFIED TYPE    Arthritis Sister     Osteoporosis Sister     Breast cancer Maternal Grandmother         MALIGNANT    Heart disease Maternal Grandmother     Cancer Maternal Grandmother         Breast    Hearing loss Maternal Grandmother     Kidney disease Maternal Grandmother     Stroke Maternal Grandfather     Heart disease Maternal Grandfather     Breast cancer Paternal  "Grandmother         MALIGNANT    Cancer Paternal Grandmother     Cancer Paternal Grandfather         UNSPECIFIED    COPD Other     Emphysema Other     Diabetes Other     Diabetes Sister     Liver disease Sister     Miscarriages / Stillbirths Sister        Family history, surgical history, past medical history, Allergies and med's reviewed with patient today and updated in [facility] EMR.     ROS:  Review of Systems   Skin:  Positive for rash and wound.   All other systems reviewed and are negative.      OBJECTIVE:  Vitals:    06/12/25 0716   BP: 117/59   Pulse: 86   Temp: 99.1 °F (37.3 °C)   SpO2: 98%   Weight: 92.1 kg (203 lb)   Height: 160 cm (62.99\")     Body mass index is 35.97 kg/m².  No LMP recorded.    Physical Exam  Cardiovascular:      Rate and Rhythm: Normal rate.   Pulmonary:      Effort: Pulmonary effort is normal.   Skin:     Findings: Rash and wound present.      Comments: Erythematous, swollen area on the abdomen with two puncture marks consistent with a spider bite and secondary skin infection.   Neurological:      General: No focal deficit present.      Mental Status: She is oriented to person, place, and time. Mental status is at baseline.         Physical Exam  Skin: Erythematous, swollen area on the abdomen with two puncture marks consistent with a spider bite and secondary skin infection.          Procedures            Health Maintenance Due   Topic Date Due    Hepatitis B (1 of 3 - 19+ 3-dose series) Never done    ANNUAL PHYSICAL  07/27/2024    DIABETIC FOOT EXAM  07/27/2024    HEMOGLOBIN A1C  05/18/2025    DIABETIC EYE EXAM  06/18/2025        Office Visit on 06/02/2025   Component Date Value Ref Range Status    SARS Antigen 06/02/2025 Not Detected  Not Detected, Presumptive Negative Final    Influenza A Antigen CONCHITA 06/02/2025 Not Detected  Not Detected Final    Influenza B Antigen CONCHITA 06/02/2025 Not Detected  Not Detected Final    Internal Control 06/02/2025 Passed  Passed Final    Lot Number " 06/02/2025 [id number]   Final    Expiration Date 06/02/2025 3/2,026   Final       Results        ASSESSMENT/ PLAN:    Diagnoses and all orders for this visit:    1. Cellulitis of abdominal wall (Primary)  -     fluconazole (Diflucan) 150 MG tablet; Take 1 tablet by mouth Every 72 (Seventy-Two) Hours for 2 doses.  Dispense: 2 tablet; Refill: 0  -     doxycycline (VIBRAMYCIN) 100 MG capsule; Take 1 capsule by mouth 2 (Two) Times a Day.  Dispense: 20 capsule; Refill: 0    2. Spider bite wound, accidental or unintentional, initial encounter  -     fluconazole (Diflucan) 150 MG tablet; Take 1 tablet by mouth Every 72 (Seventy-Two) Hours for 2 doses.  Dispense: 2 tablet; Refill: 0  -     doxycycline (VIBRAMYCIN) 100 MG capsule; Take 1 capsule by mouth 2 (Two) Times a Day.  Dispense: 20 capsule; Refill: 0        Assessment & Plan  1. Spider bite.  - Clinical presentation suggests a spider bite with subsequent minor skin infection.  - Increased redness and swelling noted around the bite site.  - Discussion regarding antibiotic allergies and previous reactions to sulfa and doxycycline.  - Prescription for doxycycline, twice daily for 10 days, with food to mitigate gastrointestinal discomfort. Diflucan prescribed as a precaution against yeast infections. Encouraged to incorporate probiotics into diet.    Orders Placed Today:     New Medications Ordered This Visit   Medications    fluconazole (Diflucan) 150 MG tablet     Sig: Take 1 tablet by mouth Every 72 (Seventy-Two) Hours for 2 doses.     Dispense:  2 tablet     Refill:  0    doxycycline (VIBRAMYCIN) 100 MG capsule     Sig: Take 1 capsule by mouth 2 (Two) Times a Day.     Dispense:  20 capsule     Refill:  0        Management Plan:     An After Visit Summary was printed and given to the patient at discharge.    Follow-up: Return if symptoms worsen or fail to improve.    Patient or patient representative verbalized consent for the use of Ambient Listening during the visit  with  NIKOLE Girard for chart documentation. 6/12/2025  08:34 EDT    NIKOLE Girard 6/12/2025 07:33 EDT  This note was electronically signed.

## 2025-07-13 DIAGNOSIS — E55.9 VITAMIN D DEFICIENCY: ICD-10-CM

## 2025-07-14 RX ORDER — ERGOCALCIFEROL 1.25 MG/1
50000 CAPSULE, LIQUID FILLED ORAL WEEKLY
Qty: 13 CAPSULE | Refills: 0 | Status: SHIPPED | OUTPATIENT
Start: 2025-07-14

## 2025-07-21 ENCOUNTER — LAB (OUTPATIENT)
Dept: LAB | Facility: HOSPITAL | Age: 45
End: 2025-07-21
Payer: COMMERCIAL

## 2025-07-21 DIAGNOSIS — E11.65 TYPE 2 DIABETES MELLITUS WITH HYPERGLYCEMIA, WITHOUT LONG-TERM CURRENT USE OF INSULIN: ICD-10-CM

## 2025-07-21 DIAGNOSIS — D72.829 LEUKOCYTOSIS, UNSPECIFIED TYPE: ICD-10-CM

## 2025-07-21 LAB
ALBUMIN SERPL-MCNC: 4.1 G/DL (ref 3.5–5.2)
ALBUMIN/GLOB SERPL: 1.4 G/DL
ALP SERPL-CCNC: 89 U/L (ref 39–117)
ALT SERPL W P-5'-P-CCNC: 11 U/L (ref 1–33)
ANION GAP SERPL CALCULATED.3IONS-SCNC: 11.3 MMOL/L (ref 5–15)
AST SERPL-CCNC: 17 U/L (ref 1–32)
BASOPHILS # BLD AUTO: 0.09 10*3/MM3 (ref 0–0.2)
BASOPHILS NFR BLD AUTO: 0.7 % (ref 0–1.5)
BILIRUB SERPL-MCNC: 0.3 MG/DL (ref 0–1.2)
BUN SERPL-MCNC: 9 MG/DL (ref 6–20)
BUN/CREAT SERPL: 11.3 (ref 7–25)
CALCIUM SPEC-SCNC: 9.6 MG/DL (ref 8.6–10.5)
CHLORIDE SERPL-SCNC: 104 MMOL/L (ref 98–107)
CO2 SERPL-SCNC: 24.7 MMOL/L (ref 22–29)
CREAT SERPL-MCNC: 0.8 MG/DL (ref 0.57–1)
DEPRECATED RDW RBC AUTO: 41.5 FL (ref 37–54)
EGFRCR SERPLBLD CKD-EPI 2021: 92.7 ML/MIN/1.73
EOSINOPHIL # BLD AUTO: 0.28 10*3/MM3 (ref 0–0.4)
EOSINOPHIL NFR BLD AUTO: 2.1 % (ref 0.3–6.2)
ERYTHROCYTE [DISTWIDTH] IN BLOOD BY AUTOMATED COUNT: 11.9 % (ref 12.3–15.4)
GLOBULIN UR ELPH-MCNC: 2.9 GM/DL
GLUCOSE SERPL-MCNC: 158 MG/DL (ref 65–99)
HBA1C MFR BLD: 6 % (ref 4.8–5.6)
HCT VFR BLD AUTO: 42.8 % (ref 34–46.6)
HGB BLD-MCNC: 14.7 G/DL (ref 12–15.9)
IMM GRANULOCYTES # BLD AUTO: 0.06 10*3/MM3 (ref 0–0.05)
IMM GRANULOCYTES NFR BLD AUTO: 0.4 % (ref 0–0.5)
LYMPHOCYTES # BLD AUTO: 3.7 10*3/MM3 (ref 0.7–3.1)
LYMPHOCYTES NFR BLD AUTO: 27.3 % (ref 19.6–45.3)
MCH RBC QN AUTO: 32.7 PG (ref 26.6–33)
MCHC RBC AUTO-ENTMCNC: 34.3 G/DL (ref 31.5–35.7)
MCV RBC AUTO: 95.1 FL (ref 79–97)
MONOCYTES # BLD AUTO: 0.68 10*3/MM3 (ref 0.1–0.9)
MONOCYTES NFR BLD AUTO: 5 % (ref 5–12)
NEUTROPHILS NFR BLD AUTO: 64.5 % (ref 42.7–76)
NEUTROPHILS NFR BLD AUTO: 8.72 10*3/MM3 (ref 1.7–7)
NRBC BLD AUTO-RTO: 0 /100 WBC (ref 0–0.2)
PLATELET # BLD AUTO: 376 10*3/MM3 (ref 140–450)
PMV BLD AUTO: 10 FL (ref 6–12)
POTASSIUM SERPL-SCNC: 4.3 MMOL/L (ref 3.5–5.2)
PROT SERPL-MCNC: 7 G/DL (ref 6–8.5)
RBC # BLD AUTO: 4.5 10*6/MM3 (ref 3.77–5.28)
SODIUM SERPL-SCNC: 140 MMOL/L (ref 136–145)
WBC NRBC COR # BLD AUTO: 13.53 10*3/MM3 (ref 3.4–10.8)

## 2025-07-21 PROCEDURE — 36415 COLL VENOUS BLD VENIPUNCTURE: CPT

## 2025-07-21 PROCEDURE — 83036 HEMOGLOBIN GLYCOSYLATED A1C: CPT

## 2025-07-21 PROCEDURE — 85025 COMPLETE CBC W/AUTO DIFF WBC: CPT

## 2025-07-21 PROCEDURE — 80053 COMPREHEN METABOLIC PANEL: CPT

## 2025-07-22 ENCOUNTER — RESULTS FOLLOW-UP (OUTPATIENT)
Dept: FAMILY MEDICINE CLINIC | Facility: CLINIC | Age: 45
End: 2025-07-22
Payer: COMMERCIAL

## 2025-07-22 ENCOUNTER — LAB (OUTPATIENT)
Dept: LAB | Facility: HOSPITAL | Age: 45
End: 2025-07-22
Payer: COMMERCIAL

## 2025-07-22 DIAGNOSIS — E11.65 TYPE 2 DIABETES MELLITUS WITH HYPERGLYCEMIA, WITHOUT LONG-TERM CURRENT USE OF INSULIN: ICD-10-CM

## 2025-07-22 DIAGNOSIS — D72.829 LEUKOCYTOSIS, UNSPECIFIED TYPE: Primary | ICD-10-CM

## 2025-07-22 LAB
ALBUMIN UR-MCNC: <1.2 MG/DL
CREAT UR-MCNC: 58 MG/DL
MICROALBUMIN/CREAT UR: NORMAL MG/G{CREAT}

## 2025-07-22 PROCEDURE — 82043 UR ALBUMIN QUANTITATIVE: CPT

## 2025-07-22 PROCEDURE — 82570 ASSAY OF URINE CREATININE: CPT

## 2025-07-23 DIAGNOSIS — E11.65 TYPE 2 DIABETES MELLITUS WITH HYPERGLYCEMIA, WITHOUT LONG-TERM CURRENT USE OF INSULIN: ICD-10-CM

## 2025-07-23 NOTE — TELEPHONE ENCOUNTER
Patient informed and voiced understanding of lab results.    Normal  Microalbumin / Creatinine Urine Ratio - Urine, Clean Catch; Comprehensive Metabolic Panel; CBC Auto Differential; Hemoglobin A1c

## 2025-07-23 NOTE — TELEPHONE ENCOUNTER
This has not been refilled in over a year. Called pt to confirm if actually taking or not.     TCB

## 2025-07-24 RX ORDER — DULAGLUTIDE 3 MG/.5ML
3 INJECTION, SOLUTION SUBCUTANEOUS WEEKLY
Qty: 6 ML | Refills: 2 | Status: SHIPPED | OUTPATIENT
Start: 2025-07-24

## 2025-08-26 ENCOUNTER — CONSULT (OUTPATIENT)
Dept: ONCOLOGY | Facility: HOSPITAL | Age: 45
End: 2025-08-26
Payer: COMMERCIAL

## 2025-08-26 ENCOUNTER — LAB (OUTPATIENT)
Facility: HOSPITAL | Age: 45
End: 2025-08-26
Payer: COMMERCIAL

## 2025-08-26 ENCOUNTER — LAB (OUTPATIENT)
Dept: ONCOLOGY | Facility: HOSPITAL | Age: 45
End: 2025-08-26
Payer: COMMERCIAL

## 2025-08-26 VITALS
OXYGEN SATURATION: 98 % | RESPIRATION RATE: 16 BRPM | HEIGHT: 63 IN | TEMPERATURE: 98.2 F | BODY MASS INDEX: 37.32 KG/M2 | HEART RATE: 101 BPM | WEIGHT: 210.6 LBS | DIASTOLIC BLOOD PRESSURE: 60 MMHG | SYSTOLIC BLOOD PRESSURE: 111 MMHG

## 2025-08-26 DIAGNOSIS — D72.829 LEUKOCYTOSIS, UNSPECIFIED TYPE: ICD-10-CM

## 2025-08-26 DIAGNOSIS — D72.828 NEUTROPHILIA: ICD-10-CM

## 2025-08-26 DIAGNOSIS — D72.820 LYMPHOCYTOSIS: ICD-10-CM

## 2025-08-26 DIAGNOSIS — J30.9 ALLERGIC RHINITIS, UNSPECIFIED SEASONALITY, UNSPECIFIED TRIGGER: ICD-10-CM

## 2025-08-26 DIAGNOSIS — R09.81 NASAL CONGESTION: ICD-10-CM

## 2025-08-26 DIAGNOSIS — D72.829 LEUKOCYTOSIS, UNSPECIFIED TYPE: Primary | ICD-10-CM

## 2025-08-26 LAB
ALBUMIN SERPL-MCNC: 4.5 G/DL (ref 3.5–5.2)
ALBUMIN/GLOB SERPL: 1.4 G/DL
ALP SERPL-CCNC: 96 U/L (ref 39–117)
ALT SERPL W P-5'-P-CCNC: 11 U/L (ref 1–33)
ANION GAP SERPL CALCULATED.3IONS-SCNC: 10.5 MMOL/L (ref 5–15)
AST SERPL-CCNC: 17 U/L (ref 1–32)
BASOPHILS # BLD AUTO: 0.08 10*3/MM3 (ref 0–0.2)
BASOPHILS # BLD MANUAL: 0.46 10*3/MM3 (ref 0–0.2)
BASOPHILS NFR BLD AUTO: 0.5 % (ref 0–1.5)
BASOPHILS NFR BLD MANUAL: 3 % (ref 0–1.5)
BILIRUB SERPL-MCNC: 0.3 MG/DL (ref 0–1.2)
BUN SERPL-MCNC: 10.7 MG/DL (ref 6–20)
BUN/CREAT SERPL: 13 (ref 7–25)
CALCIUM SPEC-SCNC: 9.5 MG/DL (ref 8.6–10.5)
CHLORIDE SERPL-SCNC: 105 MMOL/L (ref 98–107)
CO2 SERPL-SCNC: 25.5 MMOL/L (ref 22–29)
CREAT SERPL-MCNC: 0.82 MG/DL (ref 0.57–1)
DEPRECATED RDW RBC AUTO: 41.6 FL (ref 37–54)
EGFRCR SERPLBLD CKD-EPI 2021: 90 ML/MIN/1.73
EOSINOPHIL # BLD AUTO: 0.29 10*3/MM3 (ref 0–0.4)
EOSINOPHIL # BLD MANUAL: 0.31 10*3/MM3 (ref 0–0.4)
EOSINOPHIL NFR BLD AUTO: 1.9 % (ref 0.3–6.2)
EOSINOPHIL NFR BLD MANUAL: 2 % (ref 0.3–6.2)
ERYTHROCYTE [DISTWIDTH] IN BLOOD BY AUTOMATED COUNT: 11.9 % (ref 12.3–15.4)
GLOBULIN UR ELPH-MCNC: 3.2 GM/DL
GLUCOSE SERPL-MCNC: 129 MG/DL (ref 65–99)
HCT VFR BLD AUTO: 45 % (ref 34–46.6)
HGB BLD-MCNC: 15.5 G/DL (ref 12–15.9)
IMM GRANULOCYTES # BLD AUTO: 0.04 10*3/MM3 (ref 0–0.05)
IMM GRANULOCYTES NFR BLD AUTO: 0.3 % (ref 0–0.5)
LDH SERPL-CCNC: 175 U/L (ref 135–214)
LYMPHOCYTES # BLD AUTO: 3.8 10*3/MM3 (ref 0.7–3.1)
LYMPHOCYTES # BLD MANUAL: 3.83 10*3/MM3 (ref 0.7–3.1)
LYMPHOCYTES NFR BLD AUTO: 24.8 % (ref 19.6–45.3)
LYMPHOCYTES NFR BLD MANUAL: 6 % (ref 5–12)
MCH RBC QN AUTO: 32.3 PG (ref 26.6–33)
MCHC RBC AUTO-ENTMCNC: 34.4 G/DL (ref 31.5–35.7)
MCV RBC AUTO: 93.8 FL (ref 79–97)
MONOCYTES # BLD AUTO: 0.71 10*3/MM3 (ref 0.1–0.9)
MONOCYTES # BLD: 0.92 10*3/MM3 (ref 0.1–0.9)
MONOCYTES NFR BLD AUTO: 4.6 % (ref 5–12)
NEUTROPHILS # BLD AUTO: 9.8 10*3/MM3 (ref 1.7–7)
NEUTROPHILS NFR BLD AUTO: 10.39 10*3/MM3 (ref 1.7–7)
NEUTROPHILS NFR BLD AUTO: 67.9 % (ref 42.7–76)
NEUTROPHILS NFR BLD MANUAL: 64 % (ref 42.7–76)
PATHOLOGY REVIEW: YES
PLATELET # BLD AUTO: 369 10*3/MM3 (ref 140–450)
PMV BLD AUTO: 9.3 FL (ref 6–12)
POTASSIUM SERPL-SCNC: 4 MMOL/L (ref 3.5–5.2)
PROT SERPL-MCNC: 7.7 G/DL (ref 6–8.5)
RBC # BLD AUTO: 4.8 10*6/MM3 (ref 3.77–5.28)
RBC MORPH BLD: NORMAL
SMALL PLATELETS BLD QL SMEAR: ADEQUATE
SODIUM SERPL-SCNC: 141 MMOL/L (ref 136–145)
VARIANT LYMPHS NFR BLD MANUAL: 25 % (ref 19.6–45.3)
WBC MORPH BLD: NORMAL
WBC NRBC COR # BLD AUTO: 15.31 10*3/MM3 (ref 3.4–10.8)

## 2025-08-26 PROCEDURE — 85025 COMPLETE CBC W/AUTO DIFF WBC: CPT

## 2025-08-26 PROCEDURE — 83615 LACTATE (LD) (LDH) ENZYME: CPT

## 2025-08-26 PROCEDURE — 85007 BL SMEAR W/DIFF WBC COUNT: CPT

## 2025-08-26 PROCEDURE — 80053 COMPREHEN METABOLIC PANEL: CPT

## 2025-08-26 PROCEDURE — 36415 COLL VENOUS BLD VENIPUNCTURE: CPT

## 2025-08-26 PROCEDURE — G0463 HOSPITAL OUTPT CLINIC VISIT: HCPCS | Performed by: NURSE PRACTITIONER

## 2025-08-26 RX ORDER — AZELASTINE HYDROCHLORIDE 137 UG/1
SPRAY, METERED NASAL
Qty: 30 ML | Refills: 1 | Status: SHIPPED | OUTPATIENT
Start: 2025-08-26

## 2025-08-27 LAB — Lab: NORMAL

## 2025-08-28 LAB
CYTO UR: NORMAL
LAB AP CASE REPORT: NORMAL
LAB AP CLINICAL INFORMATION: NORMAL
PATH REPORT.FINAL DX SPEC: NORMAL
PATH REPORT.GROSS SPEC: NORMAL